# Patient Record
Sex: FEMALE | Race: WHITE | Employment: FULL TIME | URBAN - METROPOLITAN AREA
[De-identification: names, ages, dates, MRNs, and addresses within clinical notes are randomized per-mention and may not be internally consistent; named-entity substitution may affect disease eponyms.]

---

## 2017-10-28 ENCOUNTER — HOSPITAL ENCOUNTER (EMERGENCY)
Facility: HOSPITAL | Age: 52
Discharge: HOME | End: 2017-10-28
Attending: EMERGENCY MEDICINE
Payer: MEDICAID

## 2017-10-28 VITALS
SYSTOLIC BLOOD PRESSURE: 149 MMHG | DIASTOLIC BLOOD PRESSURE: 50 MMHG | OXYGEN SATURATION: 98 % | BODY MASS INDEX: 29.16 KG/M2 | RESPIRATION RATE: 18 BRPM | HEART RATE: 94 BPM | TEMPERATURE: 99 F | WEIGHT: 175 LBS | HEIGHT: 65 IN

## 2017-10-28 DIAGNOSIS — L02.91 ABSCESS: Primary | ICD-10-CM

## 2017-10-28 PROCEDURE — 99283 EMERGENCY DEPT VISIT LOW MDM: CPT

## 2017-10-28 PROCEDURE — 10061 I&D ABSCESS COMP/MULTIPLE: CPT

## 2017-10-28 RX ORDER — IBUPROFEN 600 MG/1
600 TABLET ORAL ONCE
Status: COMPLETED | OUTPATIENT
Start: 2017-10-28 | End: 2017-10-28

## 2017-10-28 RX ORDER — SULFAMETHOXAZOLE AND TRIMETHOPRIM 800; 160 MG/1; MG/1
1 TABLET ORAL 2 TIMES DAILY
Qty: 14 TABLET | Refills: 0 | Status: SHIPPED | OUTPATIENT
Start: 2017-10-28 | End: 2017-11-04

## 2017-10-28 NOTE — ED PROVIDER NOTES
Patient Seen in: [facility] Emergency Department    History   Patient presents with:  Abscess (integumentary)    Stated Complaint: cyst    HPI    This is a very pleasant 32-year-old female presents with right chest wall abscess near her right breast Wt 79.4 kg   LMP 10/17/2017   SpO2 98%   BMI 29.12 kg/m²         Physical Exam    GENERAL: Awake, alert oriented x3, nontoxic appearing. SKIN: Normal, warm, and dry. HEENT:  Pupils equally round and reactive to light. Conjuctiva clear.   Oropharynx is MG Oral Tab per tablet  Take 1 tablet by mouth 2 (two) times daily.   Qty: 14 tablet Refills: 0

## 2017-10-28 NOTE — ED INITIAL ASSESSMENT (HPI)
Pt has been dealing w/ small cyst for quite sometime. Location was between breast however the last week it has grown/ redness to area. Pain to right inner breast now too.

## 2017-10-30 ENCOUNTER — APPOINTMENT (OUTPATIENT)
Dept: ULTRASOUND IMAGING | Facility: HOSPITAL | Age: 52
End: 2017-10-30
Attending: EMERGENCY MEDICINE
Payer: MEDICAID

## 2017-10-30 ENCOUNTER — HOSPITAL ENCOUNTER (EMERGENCY)
Facility: HOSPITAL | Age: 52
Discharge: HOME | End: 2017-10-30
Attending: EMERGENCY MEDICINE
Payer: MEDICAID

## 2017-10-30 VITALS
TEMPERATURE: 99 F | SYSTOLIC BLOOD PRESSURE: 131 MMHG | HEART RATE: 80 BPM | WEIGHT: 175 LBS | BODY MASS INDEX: 29.16 KG/M2 | OXYGEN SATURATION: 100 % | DIASTOLIC BLOOD PRESSURE: 74 MMHG | RESPIRATION RATE: 16 BRPM | HEIGHT: 65 IN

## 2017-10-30 DIAGNOSIS — L02.91 ABSCESS: Primary | ICD-10-CM

## 2017-10-30 PROCEDURE — 87147 CULTURE TYPE IMMUNOLOGIC: CPT | Performed by: EMERGENCY MEDICINE

## 2017-10-30 PROCEDURE — 87070 CULTURE OTHR SPECIMN AEROBIC: CPT | Performed by: EMERGENCY MEDICINE

## 2017-10-30 PROCEDURE — 87076 CULTURE ANAEROBE IDENT EACH: CPT | Performed by: EMERGENCY MEDICINE

## 2017-10-30 PROCEDURE — 87205 SMEAR GRAM STAIN: CPT | Performed by: EMERGENCY MEDICINE

## 2017-10-30 PROCEDURE — 76642 ULTRASOUND BREAST LIMITED: CPT | Performed by: EMERGENCY MEDICINE

## 2017-10-30 PROCEDURE — 82962 GLUCOSE BLOOD TEST: CPT

## 2017-10-30 PROCEDURE — 99284 EMERGENCY DEPT VISIT MOD MDM: CPT

## 2017-10-30 NOTE — ED INITIAL ASSESSMENT (HPI)
Pt was seen here Saturday with abscess to R medial side of breast. Abscess was drained and packed and started on antibiotics. Pt remains on antibiotics, states doesn't look or feel better. Packing and dressing in place.  Pt reports pain, denies fevers or ch

## 2017-10-30 NOTE — ED PROVIDER NOTES
Patient Seen in: [facility] Emergency Department    History   Patient presents with:  Abscess (integumentary)    Stated Complaint: packing removed     HPI    Patient is a 60-year-old with a history of ADHD, arthritis.   She presents for evaluation of 80   Temp 98.9 °F (37.2 °C) (Temporal)   Resp 16   Ht 165.1 cm (5' 5\")   Wt 79.4 kg   LMP 10/17/2017   SpO2 100%   Breastfeeding? No   BMI 29.12 kg/m²         Physical Exam    General: Patient is awake, alert in no acute distress.    HEENT:  Sclera are not she should continue the Bactrim for now. She should return for new or worsening symptoms such as increased redness swelling or streaking otherwise follow-up with general surgery as an outpatient. She understands and agrees with the plan of care.       Dis

## 2017-10-31 NOTE — ED NOTES
Pt returned from [address]. No change in condition at this time.  A&A

## 2017-11-12 ENCOUNTER — HOSPITAL ENCOUNTER (EMERGENCY)
Facility: HOSPITAL | Age: 52
Discharge: HOME | End: 2017-11-12
Attending: EMERGENCY MEDICINE
Payer: MEDICAID

## 2017-11-12 VITALS
WEIGHT: 175 LBS | TEMPERATURE: 99 F | OXYGEN SATURATION: 98 % | BODY MASS INDEX: 29.16 KG/M2 | RESPIRATION RATE: 19 BRPM | DIASTOLIC BLOOD PRESSURE: 101 MMHG | SYSTOLIC BLOOD PRESSURE: 183 MMHG | HEART RATE: 98 BPM | HEIGHT: 65 IN

## 2017-11-12 DIAGNOSIS — L72.3 SEBACEOUS CYST: Primary | ICD-10-CM

## 2017-11-12 PROCEDURE — 99283 EMERGENCY DEPT VISIT LOW MDM: CPT

## 2017-11-12 PROCEDURE — 87077 CULTURE AEROBIC IDENTIFY: CPT | Performed by: EMERGENCY MEDICINE

## 2017-11-12 PROCEDURE — 87070 CULTURE OTHR SPECIMN AEROBIC: CPT | Performed by: EMERGENCY MEDICINE

## 2017-11-12 PROCEDURE — 10060 I&D ABSCESS SIMPLE/SINGLE: CPT

## 2017-11-12 PROCEDURE — 87205 SMEAR GRAM STAIN: CPT | Performed by: EMERGENCY MEDICINE

## 2017-11-12 NOTE — ED INITIAL ASSESSMENT (HPI)
Pt reports cyst between breasts. Had I&D 2 wks ago. Finished bactrim Rx. Presents with increased pain. Reports swelling and redness.

## 2017-11-12 NOTE — ED PROVIDER NOTES
Patient Seen in: [facility] Emergency Department    History   Patient presents with:  Cyst    Stated Complaint: cyst to chest wall. HPI    41-year-old female presents emergency room with chief complaint of cyst to the chest wall.   Patient was see no acute distress. HEENT:  no scleral icterus. Mucous membranes are moist  HEART: Regular rate and rhythm, no murmurs.   Chest: To the chest wall between the breast there is a 1 x 0.5 cm cystic lesion with a very small amount of clear to white drainage, n

## 2018-08-02 ENCOUNTER — HOSPITAL ENCOUNTER (EMERGENCY)
Facility: HOSPITAL | Age: 53
Discharge: HOME | End: 2018-08-02
Attending: EMERGENCY MEDICINE
Payer: MEDICAID

## 2018-08-02 VITALS
DIASTOLIC BLOOD PRESSURE: 81 MMHG | BODY MASS INDEX: 28 KG/M2 | HEART RATE: 78 BPM | SYSTOLIC BLOOD PRESSURE: 113 MMHG | OXYGEN SATURATION: 100 % | TEMPERATURE: 99 F | WEIGHT: 170 LBS | RESPIRATION RATE: 20 BRPM

## 2018-08-02 DIAGNOSIS — L02.91 ABSCESS: Primary | ICD-10-CM

## 2018-08-02 PROCEDURE — 99283 EMERGENCY DEPT VISIT LOW MDM: CPT

## 2018-08-02 PROCEDURE — 10061 I&D ABSCESS COMP/MULTIPLE: CPT

## 2018-08-02 RX ORDER — CLINDAMYCIN HYDROCHLORIDE 300 MG/1
300 CAPSULE ORAL 3 TIMES DAILY
Qty: 30 CAPSULE | Refills: 0 | Status: SHIPPED | OUTPATIENT
Start: 2018-08-02 | End: 2018-08-12

## 2018-08-02 RX ORDER — LIDOCAINE HYDROCHLORIDE AND EPINEPHRINE 15; 5 MG/ML; UG/ML
INJECTION, SOLUTION EPIDURAL
Status: COMPLETED
Start: 2018-08-02 | End: 2018-08-02

## 2018-08-02 NOTE — ED INITIAL ASSESSMENT (HPI)
Pt here with cyst in between breasts that started a few days ago. Pt has hx of I&D to this area less than a year ago.

## 2018-08-02 NOTE — ED PROVIDER NOTES
Patient Seen in: [facility] Emergency Department    History   Patient presents with:  Abscess (integumentary)    Stated Complaint: Abscess to chest     HPI    This is a 27-year-old female complaining of a cystic area between her breasts that has got lesions, neck is supple without masses. RESPIRATORY: Breath sounds are clear bilaterally without wheezes, rales, or rhonchi. Chest: There is a fluctuant 3 cm area in the inferior right aspect of the sternum.   There is mild induration and tenderness s medications    Clindamycin HCl 300 MG Oral Cap  Take 1 capsule (300 mg total) by mouth 3 (three) times daily.   Qty: 30 capsule Refills: 0

## 2019-01-23 ENCOUNTER — HOSPITAL ENCOUNTER (EMERGENCY)
Facility: HOSPITAL | Age: 54
Discharge: HOME | End: 2019-01-23
Attending: EMERGENCY MEDICINE
Payer: MEDICAID

## 2019-01-23 VITALS
HEART RATE: 102 BPM | OXYGEN SATURATION: 97 % | SYSTOLIC BLOOD PRESSURE: 168 MMHG | BODY MASS INDEX: 29.16 KG/M2 | HEIGHT: 65 IN | WEIGHT: 175 LBS | DIASTOLIC BLOOD PRESSURE: 86 MMHG | RESPIRATION RATE: 18 BRPM | TEMPERATURE: 99 F

## 2019-01-23 DIAGNOSIS — L08.9 INFECTED SEBACEOUS CYST: Primary | ICD-10-CM

## 2019-01-23 DIAGNOSIS — L72.3 INFECTED SEBACEOUS CYST: Primary | ICD-10-CM

## 2019-01-23 PROCEDURE — 99283 EMERGENCY DEPT VISIT LOW MDM: CPT

## 2019-01-23 PROCEDURE — 10061 I&D ABSCESS COMP/MULTIPLE: CPT | Performed by: PHYSICIAN ASSISTANT

## 2019-01-23 PROCEDURE — 10061 I&D ABSCESS COMP/MULTIPLE: CPT

## 2019-01-23 RX ORDER — CLINDAMYCIN HYDROCHLORIDE 300 MG/1
300 CAPSULE ORAL 3 TIMES DAILY
Qty: 30 CAPSULE | Refills: 0 | Status: SHIPPED | OUTPATIENT
Start: 2019-01-23 | End: 2019-01-30

## 2019-01-23 NOTE — ED PROVIDER NOTES
Patient Seen in: [facility] Emergency Department    History   Patient presents with:  Cyst    Stated Complaint: cyst to skin/needs drainage    HPI    CHIEF COMPLAINT: Infected cyst    HISTORY OF PRESENT ILLNESS: Vision is a 64-year-old female presen Other systems are as noted in HPI. Constitutional and vital signs reviewed. All other systems reviewed and negative except as noted above. Physical Exam     ED Triage Vitals [01/23/19 1520]   BP (!) 168/86   Pulse 102   Resp 18   Temp 98.9 °F (37. The patient abscess was located in the chest wall. I obtained verbal consent from the patient to drain the abscess who was informed about the possibility of bleeding, pain and worsening of the condition. Area was anesthetized with 1% lidocaine.  The absce

## 2019-01-29 ENCOUNTER — OFFICE VISIT (OUTPATIENT)
Dept: SURGERY | Facility: CLINIC | Age: 54
End: 2019-01-29
Payer: MEDICAID

## 2019-01-29 VITALS
DIASTOLIC BLOOD PRESSURE: 82 MMHG | SYSTOLIC BLOOD PRESSURE: 135 MMHG | WEIGHT: 175 LBS | BODY MASS INDEX: 29.16 KG/M2 | HEART RATE: 76 BPM | TEMPERATURE: 99 F | HEIGHT: 65 IN

## 2019-01-29 DIAGNOSIS — L72.0 EPIDERMOID CYST: Primary | ICD-10-CM

## 2019-01-29 DIAGNOSIS — Z01.818 PREOP TESTING: ICD-10-CM

## 2019-01-29 PROCEDURE — 99203 OFFICE O/P NEW LOW 30 MIN: CPT | Performed by: SURGERY

## 2019-01-29 NOTE — H&P
New Patient Visit Note       Active Problems      1. Epidermoid cyst        Chief Complaint   Patient presents with:  Cyst: New patient seen in ED on 1/23 for anterior chest cyst, I & D done C/O itching, patient denies any pain, discharge or bleeding. • Arthritis    • Genital warts      Past Surgical History:   Procedure Laterality Date   • LASER SURGERY OF CERVIX      genital warts removal   • OTHER SURGICAL HISTORY  age 11    urethra surgery   • TUBAL LIGATION  2005       The family history and socia abdominal pain, anal bleeding, blood in stool, constipation, diarrhea, nausea and vomiting. Genitourinary: Negative for difficulty urinating, dysuria, frequency and urgency. Musculoskeletal: Negative for arthralgias and myalgias.    Skin: Negative for c cellulitis. Psychiatric: She has a normal mood and affect. Her behavior is normal. Judgment and thought content normal.   Nursing note and vitals reviewed.         Assessment   Epidermoid cyst  (primary encounter diagnosis)      Plan   The patient will be

## 2019-02-04 ENCOUNTER — APPOINTMENT (OUTPATIENT)
Dept: LAB | Age: 54
End: 2019-02-04
Attending: SURGERY
Payer: MEDICAID

## 2019-02-04 DIAGNOSIS — Z01.818 PREOP TESTING: ICD-10-CM

## 2019-02-04 LAB
ATRIAL RATE: 81 BPM
P AXIS: 66 DEGREES
P-R INTERVAL: 156 MS
Q-T INTERVAL: 374 MS
QRS DURATION: 80 MS
QTC CALCULATION (BEZET): 434 MS
R AXIS: 35 DEGREES
T AXIS: 53 DEGREES
VENTRICULAR RATE: 81 BPM

## 2019-02-04 PROCEDURE — 93010 ELECTROCARDIOGRAM REPORT: CPT | Performed by: INTERNAL MEDICINE

## 2019-02-04 PROCEDURE — 93005 ELECTROCARDIOGRAM TRACING: CPT

## 2019-02-08 ENCOUNTER — LAB REQUISITION (OUTPATIENT)
Dept: LAB | Facility: HOSPITAL | Age: 54
End: 2019-02-08
Payer: MEDICAID

## 2019-02-08 DIAGNOSIS — L02.213 CUTANEOUS ABSCESS OF CHEST WALL: ICD-10-CM

## 2019-02-08 PROCEDURE — 88304 TISSUE EXAM BY PATHOLOGIST: CPT | Performed by: SURGERY

## 2019-02-22 ENCOUNTER — OFFICE VISIT (OUTPATIENT)
Dept: SURGERY | Facility: CLINIC | Age: 54
End: 2019-02-22

## 2019-02-22 VITALS
WEIGHT: 175 LBS | DIASTOLIC BLOOD PRESSURE: 80 MMHG | TEMPERATURE: 99 F | HEART RATE: 84 BPM | SYSTOLIC BLOOD PRESSURE: 128 MMHG | BODY MASS INDEX: 29 KG/M2

## 2019-02-22 DIAGNOSIS — L72.0 EPIDERMOID CYST: Primary | ICD-10-CM

## 2019-02-22 PROCEDURE — 99024 POSTOP FOLLOW-UP VISIT: CPT | Performed by: SURGERY

## 2019-02-22 NOTE — PROGRESS NOTES
Post Operative Visit Note       Active Problems  1. Epidermoid cyst         Chief Complaint   Patient presents with:  Post-Op: PO 2/8 excision of cyst on chest         History of Present Illness     Doing well. Minimal pain. Back to work.   Pathology ludmila Disp: 240 g, Rfl: 1      Review of Systems  The Review of Systems has been reviewed by me during today. Review of Systems   Constitutional: Negative for chills, diaphoresis, fatigue, fever and unexpected weight change.    HENT: Negative for hearing loss, n

## 2019-03-01 ENCOUNTER — OFFICE VISIT (OUTPATIENT)
Dept: SURGERY | Facility: CLINIC | Age: 54
End: 2019-03-01

## 2019-03-01 VITALS
WEIGHT: 175 LBS | TEMPERATURE: 98 F | BODY MASS INDEX: 29.16 KG/M2 | HEIGHT: 65 IN | HEART RATE: 85 BPM | DIASTOLIC BLOOD PRESSURE: 80 MMHG | SYSTOLIC BLOOD PRESSURE: 115 MMHG

## 2019-03-01 DIAGNOSIS — L72.0 EPIDERMOID CYST: Primary | ICD-10-CM

## 2019-03-01 PROCEDURE — 99024 POSTOP FOLLOW-UP VISIT: CPT | Performed by: SURGERY

## 2019-03-01 RX ORDER — LEVOFLOXACIN 500 MG/1
1 TABLET, FILM COATED ORAL DAILY
Refills: 1 | COMMUNITY
Start: 2019-02-18 | End: 2021-11-30

## 2019-03-01 NOTE — PROGRESS NOTES
Post Operative Visit Note       Active Problems  1. Epidermoid cyst         Chief Complaint   Patient presents with:  Post-Op: post op 2nd po excision of chest cyst. Pt denies fever, chills, drainage, redness and pain.           History of Present Illness daily., Disp: 240 g, Rfl: 1      Review of Systems  The Review of Systems has been reviewed by me during today.   Review of Systems    Physical Findings   /80   Pulse 85   Temp 98.3 °F (36.8 °C)   Ht 65\"   Wt 175 lb   BMI 29.12 kg/m²   Physical Exam

## 2021-11-30 ENCOUNTER — OFFICE VISIT (OUTPATIENT)
Dept: INTERNAL MEDICINE CLINIC | Facility: CLINIC | Age: 56
End: 2021-11-30
Payer: COMMERCIAL

## 2021-11-30 ENCOUNTER — TELEPHONE (OUTPATIENT)
Dept: INTERNAL MEDICINE CLINIC | Facility: CLINIC | Age: 56
End: 2021-11-30

## 2021-11-30 VITALS
HEIGHT: 65 IN | SYSTOLIC BLOOD PRESSURE: 108 MMHG | WEIGHT: 153 LBS | BODY MASS INDEX: 25.49 KG/M2 | DIASTOLIC BLOOD PRESSURE: 68 MMHG | TEMPERATURE: 98 F | HEART RATE: 84 BPM

## 2021-11-30 DIAGNOSIS — Z00.00 ROUTINE GENERAL MEDICAL EXAMINATION AT A HEALTH CARE FACILITY: Primary | ICD-10-CM

## 2021-11-30 DIAGNOSIS — Z11.51 SCREENING FOR HUMAN PAPILLOMAVIRUS (HPV): ICD-10-CM

## 2021-11-30 DIAGNOSIS — Z13.220 SCREENING, LIPID: ICD-10-CM

## 2021-11-30 DIAGNOSIS — M54.31 RIGHT SCIATIC NERVE PAIN: ICD-10-CM

## 2021-11-30 DIAGNOSIS — Z12.31 ENCOUNTER FOR SCREENING MAMMOGRAM FOR MALIGNANT NEOPLASM OF BREAST: ICD-10-CM

## 2021-11-30 DIAGNOSIS — Z12.11 SCREEN FOR COLON CANCER: ICD-10-CM

## 2021-11-30 DIAGNOSIS — Z12.4 SCREENING FOR CERVICAL CANCER: ICD-10-CM

## 2021-11-30 DIAGNOSIS — Z13.0 SCREENING FOR BLOOD DISEASE: ICD-10-CM

## 2021-11-30 DIAGNOSIS — Z13.29 SCREENING FOR THYROID DISORDER: ICD-10-CM

## 2021-11-30 DIAGNOSIS — Z13.1 SCREENING FOR DIABETES MELLITUS: ICD-10-CM

## 2021-11-30 DIAGNOSIS — Z11.3 SCREEN FOR STD (SEXUALLY TRANSMITTED DISEASE): ICD-10-CM

## 2021-11-30 PROBLEM — L72.0 EPIDERMOID CYST: Status: RESOLVED | Noted: 2019-01-29 | Resolved: 2021-11-30

## 2021-11-30 PROCEDURE — 87591 N.GONORRHOEAE DNA AMP PROB: CPT | Performed by: NURSE PRACTITIONER

## 2021-11-30 PROCEDURE — 87491 CHLMYD TRACH DNA AMP PROBE: CPT | Performed by: NURSE PRACTITIONER

## 2021-11-30 PROCEDURE — 90750 HZV VACC RECOMBINANT IM: CPT | Performed by: NURSE PRACTITIONER

## 2021-11-30 PROCEDURE — 3078F DIAST BP <80 MM HG: CPT | Performed by: NURSE PRACTITIONER

## 2021-11-30 PROCEDURE — 88175 CYTOPATH C/V AUTO FLUID REDO: CPT | Performed by: NURSE PRACTITIONER

## 2021-11-30 PROCEDURE — 3074F SYST BP LT 130 MM HG: CPT | Performed by: NURSE PRACTITIONER

## 2021-11-30 PROCEDURE — 3008F BODY MASS INDEX DOCD: CPT | Performed by: NURSE PRACTITIONER

## 2021-11-30 PROCEDURE — 87624 HPV HI-RISK TYP POOLED RSLT: CPT | Performed by: NURSE PRACTITIONER

## 2021-11-30 PROCEDURE — 90471 IMMUNIZATION ADMIN: CPT | Performed by: NURSE PRACTITIONER

## 2021-11-30 PROCEDURE — 99386 PREV VISIT NEW AGE 40-64: CPT | Performed by: NURSE PRACTITIONER

## 2021-11-30 RX ORDER — NAPROXEN 500 MG/1
500 TABLET ORAL 2 TIMES DAILY WITH MEALS
Qty: 60 TABLET | Refills: 0 | Status: SHIPPED | OUTPATIENT
Start: 2021-11-30 | End: 2021-12-27

## 2021-11-30 NOTE — PROGRESS NOTES
Shelia Sandoval is a 55year old female who presents for a complete physical exam:       Patient complains of:    New patient to re establish  Has not been following with a physician since our last ov.   Due for labs, mammogram and other health maintenance 01/13/2014      >=9 YRS AFLURIA TRI PRESERV FREE SINGLE DOSE (54992) FLU CLINIC                          11/16/2015      Covid-19 Vaccine Pfizer 30 mcg/0.3 ml                          03/19/2021 04/13/2021      FLUZONE 6 months and older PFS 0.5 ml (63351 no apparent distress  SKIN: no rashes,no suspicious lesions  HEENT: atraumatic, normocephalic,ears and throat are clear  EYES:PERRLA, EOMI, conjunctiva are clear  NECK: supple,no adenopathy,  CHEST: no chest tenderness  BREAST: no dominant or suspicious ma NJX  EVALUATE & TREAT, GASTRO (INTERNAL)  [facility] SAGAR 2D+3D SCREENING BILAT (CPT=77067/85119)    No follow-ups on file. There are no Patient Instructions on file for this visit.

## 2021-12-03 ENCOUNTER — LAB ENCOUNTER (OUTPATIENT)
Dept: LAB | Age: 56
End: 2021-12-03
Attending: NURSE PRACTITIONER
Payer: COMMERCIAL

## 2021-12-03 DIAGNOSIS — Z13.220 SCREENING, LIPID: ICD-10-CM

## 2021-12-03 DIAGNOSIS — Z13.29 SCREENING FOR THYROID DISORDER: ICD-10-CM

## 2021-12-03 DIAGNOSIS — Z13.1 SCREENING FOR DIABETES MELLITUS: ICD-10-CM

## 2021-12-03 DIAGNOSIS — Z13.0 SCREENING FOR BLOOD DISEASE: ICD-10-CM

## 2021-12-03 PROCEDURE — 80050 GENERAL HEALTH PANEL: CPT | Performed by: NURSE PRACTITIONER

## 2021-12-03 PROCEDURE — 80061 LIPID PANEL: CPT | Performed by: NURSE PRACTITIONER

## 2021-12-03 RX ORDER — CLINDAMYCIN PHOSPHATE 20 MG/G
1 CREAM VAGINAL NIGHTLY
Qty: 40 G | Refills: 0 | Status: SHIPPED | OUTPATIENT
Start: 2021-12-03

## 2021-12-05 ENCOUNTER — HOSPITAL ENCOUNTER (OUTPATIENT)
Dept: MAMMOGRAPHY | Age: 56
Discharge: HOME | End: 2021-12-05
Attending: NURSE PRACTITIONER
Payer: COMMERCIAL

## 2021-12-05 DIAGNOSIS — Z12.31 ENCOUNTER FOR SCREENING MAMMOGRAM FOR MALIGNANT NEOPLASM OF BREAST: ICD-10-CM

## 2021-12-05 PROCEDURE — 77067 SCR MAMMO BI INCL CAD: CPT | Performed by: NURSE PRACTITIONER

## 2021-12-05 PROCEDURE — 77063 BREAST TOMOSYNTHESIS BI: CPT | Performed by: NURSE PRACTITIONER

## 2021-12-06 ENCOUNTER — TELEPHONE (OUTPATIENT)
Dept: INTERNAL MEDICINE CLINIC | Facility: CLINIC | Age: 56
End: 2021-12-06

## 2021-12-06 DIAGNOSIS — E78.5 DYSLIPIDEMIA: Primary | ICD-10-CM

## 2021-12-06 RX ORDER — ATORVASTATIN CALCIUM 10 MG/1
10 TABLET, FILM COATED ORAL DAILY
Qty: 90 TABLET | Refills: 1 | Status: SHIPPED | OUTPATIENT
Start: 2021-12-06 | End: 2022-10-28

## 2021-12-06 NOTE — TELEPHONE ENCOUNTER
Pt stated if she has to pay for this appt (fu on labs) she would rather not. Pt stated she know she has high cholesterol and is a single mom and doesn't want to pay the office visit charge. Pt stated if SD wants to prescribe her something that would be fine. Please advise.

## 2021-12-06 NOTE — TELEPHONE ENCOUNTER
lipitor 10mg started  Labs and ov in 3 months.   Will need to see me at that time as she was a new patient to me in November

## 2021-12-07 NOTE — TELEPHONE ENCOUNTER
Patient has medical concerns that may and will require repeat testing and ov during the year. This may not be every year but she is a new patient to me and my recommendation is she start a statin due to high cholesterol. Unfortunately, I am not able to control the cost put forth by her insurance. Please confirm with patient we will be documenting my recommendations.

## 2021-12-07 NOTE — TELEPHONE ENCOUNTER
I called pt to schedule an appt.  Pt declined stated she has a high ded and refused to come in and pay for a visit outside f her physical. Please advise

## 2021-12-08 NOTE — TELEPHONE ENCOUNTER
TC to pt to explain the need to schedule an ov with SD so she can manage medication and other health concerns. I did advise the patient if she is concerned about payment we do have some options like payment plans to help with cost.  Patient declined to schedule an appt and states she has not started the medication yet and wants to do some research prior to taking.   Patient will call back if and when she wants to schedule an ov with SD.

## 2021-12-23 NOTE — PROGRESS NOTES
Addendum states that when compared to old films there is no change from prior. Routine f/u in 12 mos suggested.

## 2021-12-27 RX ORDER — NAPROXEN 500 MG/1
TABLET ORAL
Qty: 60 TABLET | Refills: 0 | Status: SHIPPED | OUTPATIENT
Start: 2021-12-27 | End: 2022-01-24

## 2021-12-27 NOTE — TELEPHONE ENCOUNTER
Last OV 11/30/21  Last PE 11/30/21  Last REFILL   Medication Quantity Refills Start End   naproxen 500 MG Oral Tab 60 tablet 0 11/30/2021      Last LABS Tsh, lipid, cmp, cbc 12/3/21    Future Appointments   Date Time Provider Ester Hernandez   2/11/2022

## 2022-01-24 RX ORDER — NAPROXEN 500 MG/1
TABLET ORAL
Qty: 60 TABLET | Refills: 0 | Status: SHIPPED | OUTPATIENT
Start: 2022-01-24

## 2022-01-24 NOTE — TELEPHONE ENCOUNTER
Last VISIT 11/30/21    Last CPE 11/30/21    Last REFILL 12/27/21 qty 60 w/0 refills    Last LABS 12/03/21 CBC, CMP, Lipid, TSH done    No Future Appointments      Per PROTOCOL? Not on protocol      Please Approve or Deny.

## 2022-02-08 ENCOUNTER — LAB ENCOUNTER (OUTPATIENT)
Dept: LAB | Facility: HOSPITAL | Age: 57
End: 2022-02-08
Attending: STUDENT IN AN ORGANIZED HEALTH CARE EDUCATION/TRAINING PROGRAM
Payer: COMMERCIAL

## 2022-02-08 DIAGNOSIS — Z01.818 PRE-OP TESTING: ICD-10-CM

## 2022-02-09 LAB — SARS-COV-2 RNA RESP QL NAA+PROBE: NOT DETECTED

## 2022-02-11 PROBLEM — K57.30 DIVERTICULOSIS, SIGMOID: Status: ACTIVE | Noted: 2022-02-11

## 2022-02-11 PROBLEM — Z12.11 SPECIAL SCREENING FOR MALIGNANT NEOPLASM OF COLON: Status: ACTIVE | Noted: 2022-02-11

## 2022-02-17 ENCOUNTER — NURSE ONLY (OUTPATIENT)
Dept: INTERNAL MEDICINE CLINIC | Facility: CLINIC | Age: 57
End: 2022-02-17
Payer: COMMERCIAL

## 2022-02-17 PROCEDURE — 90471 IMMUNIZATION ADMIN: CPT | Performed by: NURSE PRACTITIONER

## 2022-02-17 PROCEDURE — 90750 HZV VACC RECOMBINANT IM: CPT | Performed by: NURSE PRACTITIONER

## 2022-02-23 RX ORDER — NAPROXEN 500 MG/1
TABLET ORAL
Qty: 60 TABLET | Refills: 0 | Status: SHIPPED | OUTPATIENT
Start: 2022-02-23

## 2022-02-23 NOTE — TELEPHONE ENCOUNTER
Last OV: 11/30/21 annual PE    No future appointments. Latest labs: 12/3/21 TSH, Lipid, CMP and CBC    Latest RX: NAPROXEN 500MG TABLETS 60 tabs 0 refills on 1/24/22    Per protocol, not on protocol. Rx pending.
Speaking Coherently

## 2022-06-06 ENCOUNTER — TELEPHONE (OUTPATIENT)
Dept: INTERNAL MEDICINE CLINIC | Facility: CLINIC | Age: 57
End: 2022-06-06

## 2022-06-06 NOTE — TELEPHONE ENCOUNTER
She should be seen here first before referral to ortho. Has she tried PT? Xray results? Do not see at THE [facility] or Care everywhere?

## 2022-06-07 NOTE — TELEPHONE ENCOUNTER
Patient states she saw Dr Geovanny Delarosa and xrays indicating spondylolisthesis. Pt would rather talk with SD about this issue, scheduled appt for 6/16/22 with SD. Pt will have ov notes and xray results to SD for her review, fax number given. Pt verbalizes understanding.   FYI to SD.

## 2022-06-16 ENCOUNTER — OFFICE VISIT (OUTPATIENT)
Dept: INTERNAL MEDICINE CLINIC | Facility: CLINIC | Age: 57
End: 2022-06-16
Payer: COMMERCIAL

## 2022-06-16 VITALS
WEIGHT: 155 LBS | OXYGEN SATURATION: 98 % | TEMPERATURE: 98 F | HEIGHT: 65 IN | HEART RATE: 70 BPM | BODY MASS INDEX: 25.83 KG/M2 | DIASTOLIC BLOOD PRESSURE: 68 MMHG | SYSTOLIC BLOOD PRESSURE: 110 MMHG

## 2022-06-16 DIAGNOSIS — E78.5 DYSLIPIDEMIA: ICD-10-CM

## 2022-06-16 DIAGNOSIS — M43.16 ANTEROLISTHESIS OF LUMBAR SPINE: ICD-10-CM

## 2022-06-16 DIAGNOSIS — M25.559 HIP PAIN: ICD-10-CM

## 2022-06-16 DIAGNOSIS — M54.16 LUMBAR RADICULITIS: Primary | ICD-10-CM

## 2022-06-16 PROCEDURE — 99214 OFFICE O/P EST MOD 30 MIN: CPT | Performed by: NURSE PRACTITIONER

## 2022-06-16 PROCEDURE — 3074F SYST BP LT 130 MM HG: CPT | Performed by: NURSE PRACTITIONER

## 2022-06-16 PROCEDURE — 3078F DIAST BP <80 MM HG: CPT | Performed by: NURSE PRACTITIONER

## 2022-06-16 PROCEDURE — 3008F BODY MASS INDEX DOCD: CPT | Performed by: NURSE PRACTITIONER

## 2022-06-22 ENCOUNTER — TELEPHONE (OUTPATIENT)
Dept: INTERNAL MEDICINE CLINIC | Facility: CLINIC | Age: 57
End: 2022-06-22

## 2022-06-29 ENCOUNTER — HOSPITAL ENCOUNTER (OUTPATIENT)
Dept: GENERAL RADIOLOGY | Facility: HOSPITAL | Age: 57
Discharge: HOME | End: 2022-06-29
Attending: ANESTHESIOLOGY
Payer: COMMERCIAL

## 2022-06-29 ENCOUNTER — OFFICE VISIT (OUTPATIENT)
Dept: PAIN CLINIC | Facility: CLINIC | Age: 57
End: 2022-06-29
Payer: COMMERCIAL

## 2022-06-29 ENCOUNTER — TELEPHONE (OUTPATIENT)
Dept: PAIN CLINIC | Facility: CLINIC | Age: 57
End: 2022-06-29

## 2022-06-29 VITALS
DIASTOLIC BLOOD PRESSURE: 68 MMHG | BODY MASS INDEX: 26 KG/M2 | OXYGEN SATURATION: 98 % | SYSTOLIC BLOOD PRESSURE: 102 MMHG | HEART RATE: 94 BPM | WEIGHT: 155 LBS

## 2022-06-29 DIAGNOSIS — M25.551 CHRONIC PAIN OF BOTH HIPS: Primary | ICD-10-CM

## 2022-06-29 DIAGNOSIS — M25.551 HIP PAIN, CHRONIC, RIGHT: Primary | ICD-10-CM

## 2022-06-29 DIAGNOSIS — G89.29 CHRONIC PAIN OF BOTH KNEES: ICD-10-CM

## 2022-06-29 DIAGNOSIS — M25.561 CHRONIC PAIN OF BOTH KNEES: ICD-10-CM

## 2022-06-29 DIAGNOSIS — M25.552 CHRONIC PAIN OF BOTH HIPS: ICD-10-CM

## 2022-06-29 DIAGNOSIS — M25.562 CHRONIC PAIN OF BOTH KNEES: ICD-10-CM

## 2022-06-29 DIAGNOSIS — G89.29 CHRONIC PAIN OF BOTH HIPS: ICD-10-CM

## 2022-06-29 DIAGNOSIS — G89.29 CHRONIC PAIN OF BOTH HIPS: Primary | ICD-10-CM

## 2022-06-29 DIAGNOSIS — M25.551 HIP PAIN, RIGHT: ICD-10-CM

## 2022-06-29 DIAGNOSIS — M25.552 CHRONIC PAIN OF BOTH HIPS: Primary | ICD-10-CM

## 2022-06-29 DIAGNOSIS — G89.29 HIP PAIN, CHRONIC, RIGHT: Primary | ICD-10-CM

## 2022-06-29 DIAGNOSIS — M25.551 CHRONIC PAIN OF BOTH HIPS: ICD-10-CM

## 2022-06-29 PROCEDURE — 3074F SYST BP LT 130 MM HG: CPT | Performed by: ANESTHESIOLOGY

## 2022-06-29 PROCEDURE — 99205 OFFICE O/P NEW HI 60 MIN: CPT | Performed by: ANESTHESIOLOGY

## 2022-06-29 PROCEDURE — 73560 X-RAY EXAM OF KNEE 1 OR 2: CPT | Performed by: ANESTHESIOLOGY

## 2022-06-29 PROCEDURE — 3078F DIAST BP <80 MM HG: CPT | Performed by: ANESTHESIOLOGY

## 2022-06-29 PROCEDURE — 96372 THER/PROPH/DIAG INJ SC/IM: CPT | Performed by: ANESTHESIOLOGY

## 2022-06-29 PROCEDURE — 73523 X-RAY EXAM HIPS BI 5/> VIEWS: CPT | Performed by: ANESTHESIOLOGY

## 2022-06-29 RX ORDER — CELECOXIB 100 MG/1
100 CAPSULE ORAL 2 TIMES DAILY
Qty: 60 CAPSULE | Refills: 0 | Status: SHIPPED | OUTPATIENT
Start: 2022-06-29

## 2022-06-29 RX ORDER — COVID-19 ANTIGEN TEST
220 KIT MISCELLANEOUS
COMMUNITY

## 2022-06-29 RX ORDER — IBUPROFEN 200 MG
200 TABLET ORAL EVERY 6 HOURS PRN
COMMUNITY

## 2022-06-29 RX ORDER — KETOROLAC TROMETHAMINE 30 MG/ML
30 INJECTION, SOLUTION INTRAMUSCULAR; INTRAVENOUS ONCE
Status: COMPLETED | OUTPATIENT
Start: 2022-06-29 | End: 2022-06-29

## 2022-06-29 RX ORDER — ACETAMINOPHEN 325 MG/1
325 TABLET ORAL EVERY 6 HOURS PRN
COMMUNITY

## 2022-06-29 RX ADMIN — KETOROLAC TROMETHAMINE 30 MG: 30 INJECTION, SOLUTION INTRAMUSCULAR; INTRAVENOUS at 09:24:00

## 2022-06-29 NOTE — TELEPHONE ENCOUNTER
Call patient to relay results of hip x-ray. Does have moderate to severe right-sided hip osteoarthritis. Discussed intra-articular hip injection.   We will place referral.

## 2022-07-01 ENCOUNTER — TELEPHONE (OUTPATIENT)
Dept: NEUROLOGY | Facility: CLINIC | Age: 57
End: 2022-07-01

## 2022-07-01 DIAGNOSIS — M16.11 ARTHRITIS OF RIGHT HIP: Primary | ICD-10-CM

## 2022-07-01 NOTE — TELEPHONE ENCOUNTER
Prior authorization request completed for: Right hip injection   Authorization # [id number]  Authorization dates: 7/1/2022 - 9/29/2022   CPT codes approved: 35242  Number of visits/dates of service approved: 1  Physician: Dr. Tha Hernandes   Location: THE [facility]     Patient can be scheduled. Routed to Navigator.

## 2022-07-05 ENCOUNTER — HOSPITAL ENCOUNTER (OUTPATIENT)
Facility: HOSPITAL | Age: 57
Setting detail: HOSPITAL OUTPATIENT SURGERY
Discharge: HOME | End: 2022-07-05
Attending: ANESTHESIOLOGY | Admitting: ANESTHESIOLOGY
Payer: COMMERCIAL

## 2022-07-05 ENCOUNTER — APPOINTMENT (OUTPATIENT)
Dept: GENERAL RADIOLOGY | Facility: HOSPITAL | Age: 57
End: 2022-07-05
Attending: ANESTHESIOLOGY
Payer: COMMERCIAL

## 2022-07-05 VITALS
TEMPERATURE: 98 F | RESPIRATION RATE: 22 BRPM | SYSTOLIC BLOOD PRESSURE: 125 MMHG | DIASTOLIC BLOOD PRESSURE: 83 MMHG | OXYGEN SATURATION: 100 % | HEART RATE: 82 BPM

## 2022-07-05 DIAGNOSIS — M16.11 ARTHRITIS OF RIGHT HIP: ICD-10-CM

## 2022-07-05 PROCEDURE — 3E0U33Z INTRODUCTION OF ANTI-INFLAMMATORY INTO JOINTS, PERCUTANEOUS APPROACH: ICD-10-PCS | Performed by: ANESTHESIOLOGY

## 2022-07-05 PROCEDURE — BQ10ZZZ FLUOROSCOPY OF RIGHT HIP: ICD-10-PCS | Performed by: ANESTHESIOLOGY

## 2022-07-05 PROCEDURE — 3E0U3BZ INTRODUCTION OF ANESTHETIC AGENT INTO JOINTS, PERCUTANEOUS APPROACH: ICD-10-PCS | Performed by: ANESTHESIOLOGY

## 2022-07-05 PROCEDURE — 77002 NEEDLE LOCALIZATION BY XRAY: CPT | Performed by: ANESTHESIOLOGY

## 2022-07-05 RX ORDER — LIDOCAINE HYDROCHLORIDE 10 MG/ML
INJECTION, SOLUTION EPIDURAL; INFILTRATION; INTRACAUDAL; PERINEURAL
Status: DISCONTINUED | OUTPATIENT
Start: 2022-07-05 | End: 2022-07-05

## 2022-07-05 RX ORDER — METHYLPREDNISOLONE ACETATE 40 MG/ML
INJECTION, SUSPENSION INTRA-ARTICULAR; INTRALESIONAL; INTRAMUSCULAR; SOFT TISSUE
Status: DISCONTINUED | OUTPATIENT
Start: 2022-07-05 | End: 2022-07-05

## 2022-07-20 ENCOUNTER — OFFICE VISIT (OUTPATIENT)
Dept: PAIN CLINIC | Facility: CLINIC | Age: 57
End: 2022-07-20
Payer: COMMERCIAL

## 2022-07-20 VITALS — OXYGEN SATURATION: 98 % | DIASTOLIC BLOOD PRESSURE: 60 MMHG | HEART RATE: 78 BPM | SYSTOLIC BLOOD PRESSURE: 100 MMHG

## 2022-07-20 DIAGNOSIS — M25.551 HIP PAIN, RIGHT: ICD-10-CM

## 2022-07-20 DIAGNOSIS — M51.36 DDD (DEGENERATIVE DISC DISEASE), LUMBAR: Primary | ICD-10-CM

## 2022-07-20 PROBLEM — M51.369 DDD (DEGENERATIVE DISC DISEASE), LUMBAR: Status: ACTIVE | Noted: 2022-07-20

## 2022-07-20 PROCEDURE — 3078F DIAST BP <80 MM HG: CPT | Performed by: ANESTHESIOLOGY

## 2022-07-20 PROCEDURE — 3074F SYST BP LT 130 MM HG: CPT | Performed by: ANESTHESIOLOGY

## 2022-07-20 PROCEDURE — 99214 OFFICE O/P EST MOD 30 MIN: CPT | Performed by: ANESTHESIOLOGY

## 2022-07-22 ENCOUNTER — TELEPHONE (OUTPATIENT)
Dept: NEUROLOGY | Facility: CLINIC | Age: 57
End: 2022-07-22

## 2022-07-22 DIAGNOSIS — M51.36 DDD (DEGENERATIVE DISC DISEASE), LUMBAR: Primary | ICD-10-CM

## 2022-07-22 NOTE — TELEPHONE ENCOUNTER
Prior authorization request completed for: ANTONI   Authorization # NO PRIOR AUTHORIZATION REQUIRED  Authorization dates: N/A  CPT codes approved: 85080  Number of visits/dates of service approved: 1  Physician: Dr. Azra Marie   Location: THE [facility]     Patient can be scheduled. Routed to Navigator. Notification or Prior Authorization is not required for the requested services    This [facility] [employer] plan does not currently require a prior authorization for these services. If you have general questions about the prior authorization requirements, please call us at [phone] or visit [employer] > Clinician Resources > Advance and Admission Notification Requirements. The number above acknowledges your notification. Please write this number down for future reference. Notification is not a guarantee of coverage or payment.     Decision ID #:[id number]

## 2022-07-25 RX ORDER — CELECOXIB 100 MG/1
CAPSULE ORAL
Qty: 60 CAPSULE | Refills: 0 | Status: SHIPPED | OUTPATIENT
Start: 2022-07-25

## 2022-07-26 ENCOUNTER — APPOINTMENT (OUTPATIENT)
Dept: GENERAL RADIOLOGY | Facility: HOSPITAL | Age: 57
End: 2022-07-26
Attending: ANESTHESIOLOGY
Payer: COMMERCIAL

## 2022-07-26 ENCOUNTER — TELEPHONE (OUTPATIENT)
Dept: PAIN CLINIC | Facility: CLINIC | Age: 57
End: 2022-07-26

## 2022-07-26 ENCOUNTER — HOSPITAL ENCOUNTER (OUTPATIENT)
Facility: HOSPITAL | Age: 57
Setting detail: HOSPITAL OUTPATIENT SURGERY
Discharge: HOME | End: 2022-07-26
Attending: ANESTHESIOLOGY | Admitting: ANESTHESIOLOGY
Payer: COMMERCIAL

## 2022-07-26 VITALS
TEMPERATURE: 98 F | HEART RATE: 70 BPM | RESPIRATION RATE: 18 BRPM | SYSTOLIC BLOOD PRESSURE: 114 MMHG | DIASTOLIC BLOOD PRESSURE: 81 MMHG | OXYGEN SATURATION: 99 %

## 2022-07-26 DIAGNOSIS — M51.36 DDD (DEGENERATIVE DISC DISEASE), LUMBAR: ICD-10-CM

## 2022-07-26 PROCEDURE — 62323 NJX INTERLAMINAR LMBR/SAC: CPT | Performed by: ANESTHESIOLOGY

## 2022-07-26 PROCEDURE — 3E0S3BZ INTRODUCTION OF ANESTHETIC AGENT INTO EPIDURAL SPACE, PERCUTANEOUS APPROACH: ICD-10-PCS | Performed by: ANESTHESIOLOGY

## 2022-07-26 PROCEDURE — 3E0S33Z INTRODUCTION OF ANTI-INFLAMMATORY INTO EPIDURAL SPACE, PERCUTANEOUS APPROACH: ICD-10-PCS | Performed by: ANESTHESIOLOGY

## 2022-07-26 RX ORDER — DEXAMETHASONE SODIUM PHOSPHATE 10 MG/ML
INJECTION, SOLUTION INTRAMUSCULAR; INTRAVENOUS
Status: DISCONTINUED | OUTPATIENT
Start: 2022-07-26 | End: 2022-07-26

## 2022-07-26 RX ORDER — LIDOCAINE HYDROCHLORIDE 10 MG/ML
INJECTION, SOLUTION EPIDURAL; INFILTRATION; INTRACAUDAL; PERINEURAL
Status: DISCONTINUED | OUTPATIENT
Start: 2022-07-26 | End: 2022-07-26

## 2022-07-26 RX ORDER — SODIUM CHLORIDE 9 MG/ML
INJECTION INTRAVENOUS
Status: DISCONTINUED | OUTPATIENT
Start: 2022-07-26 | End: 2022-07-26

## 2022-07-26 RX ORDER — CELECOXIB 100 MG/1
100 CAPSULE ORAL 2 TIMES DAILY
Qty: 60 CAPSULE | Refills: 1 | Status: SHIPPED | OUTPATIENT
Start: 2022-07-26

## 2022-07-26 NOTE — OPERATIVE REPORT
[facility]  Operative Report  2022     Shalom Ramachandran [facility] [place] Patient Status:  Hospital Outpatient Surgery    10/20/1965 MRN [id number]   Location [address] [address] Attending Suresh Guzmán MD   Hosp Day # 0 PCP Moses Tao MD     Indication: Phong Morales is a 55year old female with lumbar degenerative disc disease    Preoperative Diagnosis:  DDD (degenerative disc disease), lumbar [M51.36]    Postoperative Diagnosis: Same as above. Procedure performed: LUMBAR INTERLAMINAR EPIDURAL INJECTION    Anesthesia: Local .    EBL: Less than 1 ml. Procedure Description:  After reviewing the patient's history and performing a focused physical examination, the diagnosis and positive previous diagnostic tests were confirmed and contraindications such as infection and coagulopathy were ruled out. Following review of allergies and potential side effects and complications, including but not necessarily limited to infection, allergic reaction, local tissue breakdown, nerve injury, post-dural puncture headache and paresis, the patient consented for the procedure. The patient was brought to the procedure room in prone position. After sterile prep lumbar spine, the L5-S1 interspace was identified with the help of fluoroscopy. Local anesthetic was given by a 25 gauge 1.5 inch needle with 1% lidocaine in that space level. Thereafter, a 20 gauge Tuohy needle was introduced and advanced under fluoroscopy. The epidural space was accessed by using loss of resistance to air technique. The needle position was confirmed with AP and lateral view under fluoroscopy. Omnipaque 180 was injected in 1 mL volume. A good epidurogram was obtained. Thereafter, dexamethasone 10 mg with normal saline of 5 mL in total volume of 6 mL was injected through the Tuohy needle. The needle was flushed with 1 mL lidocaine. The needle was withdrawn with the stylet intact in situ. The needle's tip was intact.   The patient tolerated the procedure very well without significant immediate complication. The patient's back was cleaned and sterile dressing was applied. The patient was discharged with an instruction to a responsible adult after discharge criteria were met. The patient was advised to contact us should any problems happen. The patient was also informed to go to the emergency room immediately if experiencing severe numbness or weakness in the extremities or experiencing bowel or bladder incontinence. Complications: None. Follow up: The patient was followed in the pain clinic as needed basis.           Conchis Dorado MD

## 2022-08-10 ENCOUNTER — OFFICE VISIT (OUTPATIENT)
Dept: PAIN CLINIC | Facility: CLINIC | Age: 57
End: 2022-08-10
Payer: COMMERCIAL

## 2022-08-10 ENCOUNTER — TELEPHONE (OUTPATIENT)
Dept: PAIN CLINIC | Facility: CLINIC | Age: 57
End: 2022-08-10

## 2022-08-10 VITALS — OXYGEN SATURATION: 100 % | DIASTOLIC BLOOD PRESSURE: 70 MMHG | SYSTOLIC BLOOD PRESSURE: 120 MMHG | HEART RATE: 77 BPM

## 2022-08-10 DIAGNOSIS — M51.36 DDD (DEGENERATIVE DISC DISEASE), LUMBAR: ICD-10-CM

## 2022-08-10 DIAGNOSIS — M25.551 HIP PAIN, RIGHT: Primary | ICD-10-CM

## 2022-08-10 PROCEDURE — 3078F DIAST BP <80 MM HG: CPT | Performed by: ANESTHESIOLOGY

## 2022-08-10 PROCEDURE — 3074F SYST BP LT 130 MM HG: CPT | Performed by: ANESTHESIOLOGY

## 2022-08-10 PROCEDURE — 99214 OFFICE O/P EST MOD 30 MIN: CPT | Performed by: ANESTHESIOLOGY

## 2022-08-10 RX ORDER — METHYLPREDNISOLONE 4 MG/1
TABLET ORAL
Qty: 1 EACH | Refills: 0 | Status: SHIPPED | OUTPATIENT
Start: 2022-08-10

## 2022-08-10 NOTE — TELEPHONE ENCOUNTER
Pt called to ask Dr. Fabi Colon if she can continue on her medication Celebrex since he placed her on methylPREDNISolone (MEDROL) 4 MG Oral Tablet Therapy Pack. Please advise.

## 2022-08-10 NOTE — PROGRESS NOTES
Last procedure: LUMBAR INTERLAMINAR EPIDURAL INJECTION  Date: 7/26/22  Percentage of relief obtained: 0%  Duration of relief: none    Current Pain Score: 9/10

## 2022-08-10 NOTE — TELEPHONE ENCOUNTER
Justine Singer MD  You 3 minutes ago (3:53 PM)         Yes       Message sent thru [facility]. Luciana Medina is a 45 y.o. female that was discharged in stable. Pt was accompanied by friend. Pt is driving. The patients diagnosis, condition and treatment were explained to  patient and aftercare instructions were given. The patient verbalized understanding. Patient armband removed and shredded.

## 2022-08-11 ENCOUNTER — TELEPHONE (OUTPATIENT)
Dept: NEUROLOGY | Facility: CLINIC | Age: 57
End: 2022-08-11

## 2022-08-11 NOTE — TELEPHONE ENCOUNTER
Prior authorization request completed for: ANTONI    Authorization # NO PRIOR AUTHORIZATION REQUIRED  Authorization dates: N/A  CPT codes approved: 83225  Number of visits/dates of service approved: 1  Physician: Dr. Leisa Holter   Location: THE [facility]     Patient can be scheduled. Routed to Navigator. Notification or Prior Authorization is not required for the requested services    This [facility] [employer] plan does not currently require a prior authorization for these services. If you have general questions about the prior authorization requirements, please call us at [phone] or visit [employer] > Clinician Resources > Advance and Admission Notification Requirements. The number above acknowledges your notification. Please write this number down for future reference. Notification is not a guarantee of coverage or payment.     Decision ID #:[id number]

## 2022-08-11 NOTE — TELEPHONE ENCOUNTER
Pt wants to know if she has to complete her medrol dose pack and physical therapy. Before she completes her injection?  Or her injection first?

## 2022-08-11 NOTE — TELEPHONE ENCOUNTER
Aime Chris MD  You 2 minutes ago (3:01 PM)         Complete the PT and medications first.     Message text

## 2022-08-17 ENCOUNTER — TELEPHONE (OUTPATIENT)
Dept: PAIN CLINIC | Facility: CLINIC | Age: 57
End: 2022-08-17

## 2022-08-17 NOTE — TELEPHONE ENCOUNTER
Pt called to ask if she can get her injection done first before she completes PT. She states she is unable to start PT for another month. Please advise.

## 2022-08-30 ENCOUNTER — HOSPITAL ENCOUNTER (OUTPATIENT)
Facility: HOSPITAL | Age: 57
Setting detail: HOSPITAL OUTPATIENT SURGERY
Discharge: HOME | End: 2022-08-30
Attending: ANESTHESIOLOGY | Admitting: ANESTHESIOLOGY
Payer: COMMERCIAL

## 2022-08-30 ENCOUNTER — APPOINTMENT (OUTPATIENT)
Dept: GENERAL RADIOLOGY | Facility: HOSPITAL | Age: 57
End: 2022-08-30
Attending: ANESTHESIOLOGY
Payer: COMMERCIAL

## 2022-08-30 VITALS
HEART RATE: 70 BPM | RESPIRATION RATE: 16 BRPM | HEIGHT: 65 IN | OXYGEN SATURATION: 99 % | WEIGHT: 155 LBS | SYSTOLIC BLOOD PRESSURE: 135 MMHG | DIASTOLIC BLOOD PRESSURE: 78 MMHG | TEMPERATURE: 98 F | BODY MASS INDEX: 25.83 KG/M2

## 2022-08-30 DIAGNOSIS — M51.36 DDD (DEGENERATIVE DISC DISEASE), LUMBAR: ICD-10-CM

## 2022-08-30 PROCEDURE — 3E0S3BZ INTRODUCTION OF ANESTHETIC AGENT INTO EPIDURAL SPACE, PERCUTANEOUS APPROACH: ICD-10-PCS | Performed by: ANESTHESIOLOGY

## 2022-08-30 PROCEDURE — 62323 NJX INTERLAMINAR LMBR/SAC: CPT | Performed by: ANESTHESIOLOGY

## 2022-08-30 PROCEDURE — 3E0S33Z INTRODUCTION OF ANTI-INFLAMMATORY INTO EPIDURAL SPACE, PERCUTANEOUS APPROACH: ICD-10-PCS | Performed by: ANESTHESIOLOGY

## 2022-08-30 RX ORDER — SODIUM CHLORIDE 9 MG/ML
INJECTION INTRAVENOUS
Status: DISCONTINUED | OUTPATIENT
Start: 2022-08-30 | End: 2022-08-30

## 2022-08-30 RX ORDER — DEXAMETHASONE SODIUM PHOSPHATE 10 MG/ML
INJECTION, SOLUTION INTRAMUSCULAR; INTRAVENOUS
Status: DISCONTINUED | OUTPATIENT
Start: 2022-08-30 | End: 2022-08-30

## 2022-08-30 RX ORDER — LIDOCAINE HYDROCHLORIDE 10 MG/ML
INJECTION, SOLUTION EPIDURAL; INFILTRATION; INTRACAUDAL; PERINEURAL
Status: DISCONTINUED | OUTPATIENT
Start: 2022-08-30 | End: 2022-08-30

## 2022-08-31 RX ORDER — METHYLPREDNISOLONE 4 MG/1
TABLET ORAL
Qty: 21 EACH | Refills: 0 | OUTPATIENT
Start: 2022-08-31

## 2022-09-03 ENCOUNTER — PATIENT MESSAGE (OUTPATIENT)
Dept: PAIN CLINIC | Facility: CLINIC | Age: 57
End: 2022-09-03

## 2022-09-06 RX ORDER — METHYLPREDNISOLONE 4 MG/1
TABLET ORAL
Qty: 1 EACH | Refills: 0 | Status: SHIPPED | OUTPATIENT
Start: 2022-09-06

## 2022-09-06 NOTE — TELEPHONE ENCOUNTER
From: Gabby Childs  To: Olya Chris MD  Sent: 9/3/2022 2:22 PM CDT  Subject: Medrol Pack    Brittany,    Dr. Yuriy Landis said that he would call in a Medrol Pack for me to start a week after my most recent back injection. Please let me know when he will be calling this in to [facility] on [address].      Thank you so much,  [facility]

## 2022-09-08 ENCOUNTER — TELEPHONE (OUTPATIENT)
Dept: ORTHOPEDICS CLINIC | Facility: CLINIC | Age: 57
End: 2022-09-08

## 2022-09-08 ENCOUNTER — PATIENT MESSAGE (OUTPATIENT)
Dept: ORTHOPEDICS CLINIC | Facility: CLINIC | Age: 57
End: 2022-09-08

## 2022-09-08 DIAGNOSIS — M25.551 RIGHT HIP PAIN: Primary | ICD-10-CM

## 2022-09-08 NOTE — TELEPHONE ENCOUNTER
From: Hever Potter  To: Aleida Berumen MD  Sent: 9/8/2022 10:24 AM CDT  Subject: Hip X-ray     Hi Dr. Freddy Talbert,    I recently had X-rays of my Hip done at [facility]. Please let me know if you are able to access them.  If not, I can  copies of the X-rays at the hospital.    Thank you,  [facility]

## 2022-09-16 ENCOUNTER — OFFICE VISIT (OUTPATIENT)
Dept: PHYSICAL THERAPY | Age: 57
End: 2022-09-16
Attending: ANESTHESIOLOGY
Payer: COMMERCIAL

## 2022-09-16 ENCOUNTER — TELEPHONE (OUTPATIENT)
Dept: PHYSICAL THERAPY | Facility: HOSPITAL | Age: 57
End: 2022-09-16

## 2022-09-16 DIAGNOSIS — M25.551 HIP PAIN, RIGHT: ICD-10-CM

## 2022-09-16 DIAGNOSIS — M51.36 DDD (DEGENERATIVE DISC DISEASE), LUMBAR: ICD-10-CM

## 2022-09-16 PROCEDURE — 97140 MANUAL THERAPY 1/> REGIONS: CPT

## 2022-09-16 PROCEDURE — 97110 THERAPEUTIC EXERCISES: CPT

## 2022-09-16 PROCEDURE — 97161 PT EVAL LOW COMPLEX 20 MIN: CPT

## 2022-09-16 NOTE — PROGRESS NOTES
PHYSICAL THERAPY INITIAL EVALUATION     Date of service: 9/16/2022  Dx: Hip pain, right (M25.551), DDD (degenerative disc disease), lumbar (M51.36)   Insurance: [facility] CORE/NAVIGATE  Insurance Limits: N/A  Visit #: 1  Authorized # of Visits: N/A  POC/Auth Expiration: N/A  Authorizing Physician/Provider: Karel     PATIENT SUMMARY     History/ANTELMO: \"I tore my ACL about 28 years ago. It was a partial tear, never had suregry. On and off for the past 5 years, it's been acting up. I don't think that I have any ACL left. Sometimes I would be walking and I would feel some pain in the right hip. I could stretch it and then I could keep walking. \"     The patient reports a pain in the right glute and the right groin. \"I haven't run in the past 8 months. Running became really difficult so all I do is the elliptical. I have two epidurals in the past few months. I had a hip injection and it was lower than I thought it should've been. I was in so much pain. It's so consuming. \" The patient reports that she does not want back surgery. She is hoping to buy some time before having a hip surgery. DOI/S: chronic insidious onset     PMH: The patient's PMH was reviewed with the patient including allergies, medications, and surgical and medical history. PLF/Personal Goals: The patient is active and works out regularly, 5 days a week. The patient's primary goal is to improve pain management and function to delay hip replacement surgery. OBJECTIVE:     Pain/Symptom Presentation: Patient reports cracking in her hip. Pain at rest: 3-4/10  Pain at worst: 10/10    Outcomes Measure(s): FOTO: [phone]    Activity Measures:  Sitting: Moderate Activity Limitation: Patient is able to sit up to 1 hour before disruption due to hip pain. Deskwork/Computer Work: Moderate Activity Limitation: Patient is able to complete deskwork up to 1 hour before disruption due to hip hip pain. Bending Forward:  Moderate Activity Limitation: Patient is with pain and mobility limitations for bending forward. Standing/Walking After Prolonged Sitting: Moderate Activity Limitation: Patient reports stiffness in her hip upon standing/walking after prolonged sitting that subsides within 15-20 minutes. Standing: Moderate Activity Limitation: Patient is able to stand up to 1 hour before disruption due to symptoms. Walking: Mild Activity Limitation: Patient is able to walk 2 miles daily. Ascending stairs: Mild Activity Limitation: Patient is able to ascend stairs with reciprocal gait pattern, mild deviations   Descending stairs: Mild Activity Limitation: Patient is able to ascend stairs with reciprocal gait pattern, mild deviations     ROM:   Hip Motion PROM AROM    Right Left Right Left   Hip Flexion 120 130 N/A N/A   Hip Extension N/A N/A N/A N/A   Hip ABD N/A N/A N/A N/A   Hip ER (at 90deg hip flex) 50 50 N/A N/A   Hip IR (at 90deg hip flex) 20 20 N/A N/A   *indicates activity was associated with pain    Strength/MMT:  Hip Motion Strength    Right Left   Hip Flexion 4/5 4/5   Hip Extension 4-/5 4/5   Hip ABD 4-/5 4/5   Hip ER 4/5 4/5   Hip IR  4+/5 5/5   *indicates activity was associated with pain     Special Tests:   Low Back Special Tests: Forward spinal flexion: (-)  Spinal extension: (-)  Slump test:(R) (-), (L) (-)  SLR: (R) (-), (L) (-)    Hip Special Tests:   PHIL Test: (R) (-), (L) (-)  FADIR Test: (R) (+), (L) (-)  Hip Scour: (R) (+), (L) (-)  Log Roll Test: (R) (-), (L) (-)    ASSESSMENT:     ELVIS is a 55year old-year-old female that presents to physical therapy evaluation with complaints of right hip and low back pain. The patient is hoping to delay a hip replacement surgery due to limited availability to take time off of work. The patient reports right-sided groin pain and stiffness that limits her ability to sit for extended time periods, prolonged standing, walking long distances, and navigating stairs.  The patient demonstrates deficits in hip mobility and LE strength deficits. The patient led a previously active lifestyle. She works out regularly, but has had to modify her workouts recently due to hip pain. She is unable to run any longer due to hip pain. The patient would benefit from physical therapy to facilitate improved pain and symptom management for improve tolerance to ADL's. Precautions/WB Status: WBAT  Education or Treatment Limitation(s): None  Rehab Potential: Good    TREATMENT:     Initial Evaluation: x 15min     Therapeutic Exercise: x 15min  DL bridging: 2 x 10  Supine wipers: x 10 (B)   Supine hip flexor stretch: x 30\" (B)   S/L clamshell: 2 x 10 (B)  Patient Education: Patient was educated on anatomy and pathophysiology of current condition, rationale for physical therapy, anticipated treatment interventions, prognosis, timeline for recovery, and expected functional outcomes based on evaluative findings. Patient was educated on the importance of compliance with consistent treatment and HEP to achieve mutually established goals. Administered HEP: Issued and reviewed HEP handout, exercise selection, and recommended resistance. Provided verbal and written instructions/cueing for proper technique and common errors/compensations as needed. Neuromuscular Re-education: x 10min  Trigger Point Release: (R) glute max, glute med, TFL, ITB/VL, hip flexor  Supine 90-90 posterior hip joint mobs: Grade 3, 4 x 10\" (B)   Lateral hip traction: Grade 3, 4 x 10\" (b)     Manual Therapy: x 5min  Soft tissue mobilization: (R) glute max, glute med, TFL, ITB/VL, hip flexor    Home Exercise Program: Patient was issued a HEP handout 9/16/2022 including DL bridging, supine wipers, supine hip flexor stretch, and S/L clamshell. Provider Interactions With Patient:   Patient education was provided as described above. All patient's questions were answered and the patient denied further comments, complaints, or concerns upon departure.    Patient was issued an appt list and verbally confirmed the next appt date and time to ensure consistency with physical therapy attendance. Charges: PT Eval Low Complexity Complexity x 1, MT x 1, Therex x1  Total Timed Treatment: 45min       Total Treatment Time: 45min     PLAN OF CARE:      Goals:  Short-Term Goals:  1. The patient will improve hip IR mobility to 30deg to facilitate improved tolerance to trunk rotation activities. Timeframe: 3-4 weeks. Long-Term Goals:  1. The patient will improve hip mobility to facilitate sitting for 3 hours at a time for occupational activities. Timeframe: 4-6 weeks. 2. The patient will improve LE strength and endurance to facilitate walking distances of 3 mile(s) daily for household and community ambulation and general health and wellness activities. Timeframe: 6-8 weeks. 3. Patient will improve LE mobility, strength, and single-leg stabilization to meet strength requirements for reciprocal stair navigation pattern with no asymmetries for ascending and descending 3 flights of stairs daily for household and community ambulation. Timeframe: 6-8 weeks. Plan Frequency / Duration: Patient will be seen for 2x/week for 6 weeks, for a total of 12 visits, over a 90 day period. We will re-evaluate the patient at that time in order to determine functional progress, evaluate short-term goal completion, and establish an updated plan of care. Possible treatment interventions will/may include: Therapeutic Activities, Therapeutic Exercise, Neuromuscular Re-education, Manual Therapy, Home Exercise Program Instruction, Patient Education, Self-Care/Home Management, and Modalities as needed. Patient/Family was advised of these findings, precautions, and treatment options and has agreed to actively participate in planning and for this course of care. Thank you for your referral. Please co-sign or sign and return this letter via fax as soon as possible to [phone].  If you have any questions, please contact me at Dept: [phone]. Sincerely,    X___Emily Amon Mcardle, PT, DPT, SCS, ATC, CSCS____ Date: _____9/16/2022________    Electronically signed by therapist: Dejah Giang, PT  [de-identified] certification required: Yes  I certify the need for these services furnished under this plan of treatment and while under my care.

## 2022-09-16 NOTE — PATIENT INSTRUCTIONS
Patient was issued a HEP handout from [email or web address]. Exercise selection, recommended resistance, and proper form/technique were reviewed with the patient. Patient verbalized understanding/comprehension of instruction and recommendations. View at [email or web address] using code: [email or web address]

## 2022-09-23 ENCOUNTER — OFFICE VISIT (OUTPATIENT)
Dept: PHYSICAL THERAPY | Age: 57
End: 2022-09-23
Attending: ANESTHESIOLOGY

## 2022-09-23 PROCEDURE — 97112 NEUROMUSCULAR REEDUCATION: CPT

## 2022-09-23 PROCEDURE — 97140 MANUAL THERAPY 1/> REGIONS: CPT

## 2022-09-23 PROCEDURE — 97110 THERAPEUTIC EXERCISES: CPT

## 2022-09-23 NOTE — PROGRESS NOTES
Date of Service: 9/23/2022  Dx: Hip pain, right (M25.551), DDD (degenerative disc disease), lumbar (M51.36)           Insurance: [facility] CORE/NAVIGATE  Insurance Limits: N/A  Visit #: 2  Authorized # of Visits: N/A  POC/Auth Expiration: N/A  Date of Last PN: 9/16/22 (Visit #1)  Authorizing Physician/Provider: Marlys Alcaraz MD visit: N/A  Fall Risk: Standard         Precautions: None            Subjective: \"I'm okay. It's not awful. If I didn't have the pain I endured before I would think that I'm in pain. I've had two or three great days and then I have any awful day. Once of the exercises I could not do - the one that I hang my leg off the edge of the bed. \" The patient reports that she has a soft mattress topper and it will irritate her back. She reports that her knee was hurting and she's not sure if it's due to the wiper exercises. She notices a difference in strength on the right side compared to the left. Pain/Symptom Presentation: Patient reports cracking in her hip.     Pain at rest: 3-4/10  Pain at worst: 10/10    Objective:   ROM:   Hip Motion PROM AROM    Right Left Right Left   Hip Flexion 120 130 N/A N/A   Hip Extension N/A N/A N/A N/A   Hip ABD N/A N/A N/A N/A   Hip ER (at 90deg hip flex) 50 50 N/A N/A   Hip IR (at 90deg hip flex) 20 20 N/A N/A   *indicates activity was associated with pain    Strength/MMT:  Hip Motion Strength    Right Left   Hip Flexion 4/5 4/5   Hip Extension 4-/5 4/5   Hip ABD 4-/5 4/5   Hip ER 4/5 4/5   Hip IR  4+/5 5/5   *indicates activity was associated with pain     Treatment:    Therapeutic Exercise: x 20min  Manual hamstring stretch: x 30\" (B)   DL bridging: 2 x 10  Supine wipers: x 10 (B) - cracking in hips   Supine hip flexor stretch: x 30\" (B)   S/L clamshell: 2 x 10 (B), red therband  S/L hip ABD: 2 x 10 (B)    Neuromuscular Re-education: x 15min  Trigger Point Release: (R) glute max, glute med, TFL, ITB/VL, hip flexor  Lumbar PA accessory joint mobs - L1-L5: Grade 3, 4 x 10\" each  Supine 90-90 posterior hip joint mobs: Grade 3, 4 x 10\" (B)   Lateral hip traction: Grade 3, 4 x 10\" (B)     Manual Therapy: x 10min  Soft tissue mobilization: (R) glute max, glute med, TFL, ITB/VL, hip flexor    Provider Interactions With Patient:   Added therapeutic exercises as documented, with cueing provided throughout performance to ensure correct technique during exercise. Assessment: Damon López continues to have complaints of pain and strength deficits on her left hip compared to the right. The patient remains compliant with her HEP. She is still with limitations in hip mobility on her right compared to the left, mostly noted with rotational mobility. She seems to respond well to joint mobilizations to her hip. The patient would benefit from continued therapy for further progression in mobility, strength, and endurance for improved tolerance to ADL's     Goals:   Short-Term Goals:  1. The patient will improve hip IR mobility to 30deg to facilitate improved tolerance to trunk rotation activities. Timeframe: 3-4 weeks. Long-Term Goals:  1. The patient will improve hip mobility to facilitate sitting for 3 hours at a time for occupational activities. Timeframe: 4-6 weeks. 2. The patient will improve LE strength and endurance to facilitate walking distances of 3 mile(s) daily for household and community ambulation and general health and wellness activities. Timeframe: 6-8 weeks. 3. Patient will improve LE mobility, strength, and single-leg stabilization to meet strength requirements for reciprocal stair navigation pattern with no asymmetries for ascending and descending 3 flights of stairs daily for household and community ambulation. Timeframe: 6-8 weeks. Plan: Continue to progress hip mobility, strength, and stability. HEP: Patient was issued a HEP handout 9/16/2022 including DL bridging, supine wipers, supine hip flexor stretch, and S/L clamshell. Verbally added S/L hip ABD 9/23/22. Charges: NMR x 1, Therex x 1, MT x 1         Total Timed Treatment: 45min    Total Treatment Time: 45min

## 2022-09-26 ENCOUNTER — TELEPHONE (OUTPATIENT)
Dept: INTERNAL MEDICINE CLINIC | Facility: CLINIC | Age: 57
End: 2022-09-26

## 2022-09-26 DIAGNOSIS — Z13.0 SCREENING FOR DISORDER OF BLOOD AND BLOOD-FORMING ORGANS: ICD-10-CM

## 2022-09-26 DIAGNOSIS — Z00.00 ROUTINE GENERAL MEDICAL EXAMINATION AT A HEALTH CARE FACILITY: Primary | ICD-10-CM

## 2022-09-26 DIAGNOSIS — Z13.228 SCREENING FOR METABOLIC DISORDER: ICD-10-CM

## 2022-09-26 DIAGNOSIS — Z13.29 SCREENING FOR THYROID DISORDER: ICD-10-CM

## 2022-09-26 DIAGNOSIS — Z13.220 SCREENING FOR LIPID DISORDERS: ICD-10-CM

## 2022-09-26 NOTE — TELEPHONE ENCOUNTER
Future Appointments   Date Time Provider Ester Mary   10/27/2022  1:40 PM URBANO Juarez [facility]     Orders to edward- Pt informed that labs need to be completed no sooner than 2 weeks prior to the appt.  Pt aware to fast-no call back required

## 2022-09-28 ENCOUNTER — OFFICE VISIT (OUTPATIENT)
Dept: PHYSICAL THERAPY | Age: 57
End: 2022-09-28
Attending: ANESTHESIOLOGY

## 2022-09-28 PROCEDURE — 97110 THERAPEUTIC EXERCISES: CPT

## 2022-09-28 PROCEDURE — 97112 NEUROMUSCULAR REEDUCATION: CPT

## 2022-09-28 PROCEDURE — 97140 MANUAL THERAPY 1/> REGIONS: CPT

## 2022-09-28 NOTE — PROGRESS NOTES
Date of Service: 9/28/2022  Dx: Hip pain, right (M25.551), DDD (degenerative disc disease), lumbar (M51.36)  Insurance: [facility] CORE/NAVIGATE  Insurance Limits: N/A  Visit #: 3  Authorized # of Visits: N/A  POC/Auth Expiration: N/A  Date of Last PN: 9/16/22 (Visit #1)  Authorizing Physician/Provider: Nanci Alcaraz MD visit: N/A  Fall Risk: Standard         Precautions: None            Subjective: \"I'm okay. I have this awful pain in my mid-back and it just won't quit. It's annoying. Pain/Symptom Presentation: Patient reports cracking in her hip.     Pain at rest: 3-4/10  Pain at worst: 10/10    Objective:   ROM:   Hip Motion PROM AROM    Right Left Right Left   Hip Flexion 120 130 N/A N/A   Hip Extension N/A N/A N/A N/A   Hip ABD N/A N/A N/A N/A   Hip ER (at 90deg hip flex) 50 50 N/A N/A   Hip IR (at 90deg hip flex) 20 20 N/A N/A   *indicates activity was associated with pain    Strength/MMT:  Hip Motion Strength    Right Left   Hip Flexion 4/5 4/5   Hip Extension 4-/5 4/5   Hip ABD 4-/5 4/5   Hip ER 4/5 4/5   Hip IR  4+/5 5/5   *indicates activity was associated with pain     Treatment:    Therapeutic Exercise: x 20min  Manual hamstring stretch: x 30\" (B)   DL bridging: 2 x 10  Supine wipers: x 10 (B) - cracking in hips   Supine hip flexor stretch: x 30\" (B)   S/L clamshell: 2 x 10 (B), red therband  S/L reverse clamshell: x 20 (B)   S/L hip ABD: 2 x 10 (B)   DL wall sit x 30\"   HEP update: Reviewed new HEP handout with new exercises and progressions, provided verbal and written instructions/cueing for proper technique and common errors/compensations as needed    Neuromuscular Re-education: x 15min  Trigger Point Release: (R) glute max, glute med, TFL, ITB/VL, hip flexor  Thoracic PA accessory joint mobs - T1-T12: Grade 3, 4 x 10\" each  Lumbar PA accessory joint mobs - L1-L5: Grade 3, 4 x 10\" each  Supine 90-90 posterior hip joint mobs: Grade 3, 4 x 10\" (B)   Lateral hip traction: Grade 3, 4 x 10\" (B)   Dead bug nicole SB squeeze: 1 x 10 x 3\" (B)     Manual Therapy: x 10min  Soft tissue mobilization: (R) glute max, glute med, TFL, ITB/VL, hip flexor    Provider Interactions With Patient:   Provided patient with a written copy of exercise instruction which was also documented in patient's electronic medical chart. Assessment: Patrick De La Cruz was with some mid-back pain upon arriving to her appt today. The patient was treated with additional joint mobilizations for her thoracic spine. The patient was taken through additional glute strengthening and core stabilization exercises this date with good tolerance. The patient feels that she has been struggling to see improvements in strength with the leg press at the gym. Discussed the option of doing a DL wall squat nicole hold for additional quad strengthening that may be less irritating than the [facility] machine, but may give additional load compared to the leg press machine. The patient inquired about other modalities. Per patient request, we will add electrical stimulation next session. The patient was educated that this modality is mostly used for pain management and will not, by itself, cure her condition or resolve her pain. Goals:   Short-Term Goals:  1. The patient will improve hip IR mobility to 30deg to facilitate improved tolerance to trunk rotation activities. Timeframe: 3-4 weeks. Long-Term Goals:  1. The patient will improve hip mobility to facilitate sitting for 3 hours at a time for occupational activities. Timeframe: 4-6 weeks. 2. The patient will improve LE strength and endurance to facilitate walking distances of 3 mile(s) daily for household and community ambulation and general health and wellness activities. Timeframe: 6-8 weeks.   3. Patient will improve LE mobility, strength, and single-leg stabilization to meet strength requirements for reciprocal stair navigation pattern with no asymmetries for ascending and descending 3 flights of stairs daily for household and community ambulation. Timeframe: 6-8 weeks. Plan: Add electrical stimulation next session. HEP: Patient was issued a HEP handout 9/28/22 including DL bridging, S/L clamshell, S/L reverse clamshell, dead bug nicole SB squeeze, S/L hip ABD, and supine wipers.      Charges: NMR x 1, Therex x 1, MT x 1         Total Timed Treatment: 45min    Total Treatment Time: 45min

## 2022-09-28 NOTE — PATIENT INSTRUCTIONS
Patient was issued a HEP handout from [email or web address]. Exercise selection, recommended resistance, and proper form/technique were reviewed with the patient. Patient verbalized understanding/comprehension of instruction and recommendations. View at [email or web address] using code: [email or web address]

## 2022-09-30 ENCOUNTER — OFFICE VISIT (OUTPATIENT)
Dept: PHYSICAL THERAPY | Age: 57
End: 2022-09-30
Attending: ANESTHESIOLOGY

## 2022-09-30 PROCEDURE — 97112 NEUROMUSCULAR REEDUCATION: CPT

## 2022-09-30 PROCEDURE — 97140 MANUAL THERAPY 1/> REGIONS: CPT

## 2022-09-30 PROCEDURE — 97014 ELECTRIC STIMULATION THERAPY: CPT

## 2022-09-30 NOTE — PROGRESS NOTES
Date of Service: 9/30/2022  Dx: Hip pain, right (M25.551), DDD (degenerative disc disease), lumbar (M51.36)  Insurance: [facility] CORE/NAVIGATE  Insurance Limits: N/A  Visit #: 4  Authorized # of Visits: N/A  POC/Auth Expiration: N/A  Date of Last PN: 9/16/22 (Visit #1)  Authorizing Physician/Provider: Surendra Alcaraz MD visit: N/A  Fall Risk: Standard         Precautions: None            Subjective: \"I'm having a really hard time walking today. I wonder at time if there is something going on in my glute. I have pain there and it goes down the leg. \"     Pain/Symptom Presentation: Patient reports cracking in her hip.     Pain at rest: 3-4/10  Pain at worst: 10/10    Objective:   ROM:   Hip Motion PROM AROM    Right Left Right Left   Hip Flexion 120 130 N/A N/A   Hip Extension N/A N/A N/A N/A   Hip ABD N/A N/A N/A N/A   Hip ER (at 90deg hip flex) 50 50 N/A N/A   Hip IR (at 90deg hip flex) 20 20 N/A N/A   *indicates activity was associated with pain    Strength/MMT:  Hip Motion Strength    Right Left   Hip Flexion 4/5 4/5   Hip Extension 4-/5 4/5   Hip ABD 4-/5 4/5   Hip ER 4/5 4/5   Hip IR  4+/5 5/5   *indicates activity was associated with pain     Treatment:    Therapeutic Exercise: x 6min  Hip PROM - IR/ER x 10 (R)   Prone quad stretch: x 30\" (B)   Manual hamstring stretch: x 30\" (B)   DL bridging: 2 x 10    Neuromuscular Re-education: x 15min  Trigger Point Release: (R) glute max, glute med, TFL, ITB/VL, hip flexor  Thoracic PA accessory joint mobs - T1-T12: Grade 3, 4 x 10\" each  Lumbar PA accessory joint mobs - L1-L5: Grade 3, 4 x 10\" each  Supine 90-90 posterior hip joint mobs: Grade 3, 4 x 10\" (B)   Lateral hip traction: Grade 3, 6 x 10\" (B)   Dead bug nicole SB squeeze: 1 x 10 x 3\" (B)     Manual Therapy: x 14min  Soft tissue mobilization: (R) glute max, glute med, TFL, ITB/VL, hip flexor    Electrical Stimulation: IFC x 10min    Provider Interactions With Patient:   Provided patient with a written copy of exercise instruction which was also documented in patient's electronic medical chart. Assessment: Dearl Babita was with increased pain complaints this session. She demonstrates some stiffness upon waling into the clinic. We spent additional time working on pain management this session. We also tried electrical stimulation modality treatment this date for additional pain management. The patient is still with apparent mobility deficits at her right hip in comparison to her left and would benefit from continued therapy for further progression in hip mobility and LE and core strength for improved tolerance to ADL's. Goals:   Short-Term Goals:  1. The patient will improve hip IR mobility to 30deg to facilitate improved tolerance to trunk rotation activities. Timeframe: 3-4 weeks. Long-Term Goals:  1. The patient will improve hip mobility to facilitate sitting for 3 hours at a time for occupational activities. Timeframe: 4-6 weeks. 2. The patient will improve LE strength and endurance to facilitate walking distances of 3 mile(s) daily for household and community ambulation and general health and wellness activities. Timeframe: 6-8 weeks. 3. Patient will improve LE mobility, strength, and single-leg stabilization to meet strength requirements for reciprocal stair navigation pattern with no asymmetries for ascending and descending 3 flights of stairs daily for household and community ambulation. Timeframe: 6-8 weeks. Plan: Follow up on tolerance and response to electrical stimulation treatment. HEP: Patient was issued a HEP handout 9/28/22 including DL bridging, S/L clamshell, S/L reverse clamshell, dead bug nicole SB squeeze, S/L hip ABD, and supine wipers.      Charges: NMR x 1, MT x 1, Electrical Stimualtion x 1  Total Timed Treatment: 45min    Total Treatment Time: 45min

## 2022-10-05 ENCOUNTER — OFFICE VISIT (OUTPATIENT)
Dept: PHYSICAL THERAPY | Age: 57
End: 2022-10-05
Attending: ANESTHESIOLOGY
Payer: COMMERCIAL

## 2022-10-05 PROCEDURE — 97110 THERAPEUTIC EXERCISES: CPT

## 2022-10-05 PROCEDURE — 97112 NEUROMUSCULAR REEDUCATION: CPT

## 2022-10-05 PROCEDURE — 97140 MANUAL THERAPY 1/> REGIONS: CPT

## 2022-10-05 NOTE — PROGRESS NOTES
Date of Service: 10/5/2022  Dx: Hip pain, right (M25.551), DDD (degenerative disc disease), lumbar (M51.36)  Insurance: [facility] CORE/NAVIGATE  Insurance Limits: N/A  Visit #: 5  Authorized # of Visits: N/A  POC/Auth Expiration: N/A  Date of Last PN: 9/16/22 (Visit #1)  Authorizing Physician/Provider: Breann Alcaraz MD visit: N/A  Fall Risk: Standard         Precautions: None            Subjective: \"The hip is better than it was last time. I think I have to refrain and that's so sad. I'm okay. Today is a good day. I'm so cautiously optimistic but then I have days where it's bad. \" The patient was able to 50 and 40 minutes on the elliptical since her last appt. The patient reports that she thinks that she felt better after the electrical stimulation. The patient is complaining of pain in her knee and her upper back as well.      Pain/Symptom Presentation:    Pain at rest: 3-4/10  Pain at worst: 10/10    Objective:   ROM:   Hip Motion PROM AROM    Right Left Right Left   Hip Flexion 120 130 N/A N/A   Hip Extension N/A N/A N/A N/A   Hip ABD N/A N/A N/A N/A   Hip ER (at 90deg hip flex) 50 50 N/A N/A   Hip IR (at 90deg hip flex) 20 20 N/A N/A   *indicates activity was associated with pain    Strength/MMT:  Hip Motion Strength    Right Left   Hip Flexion 4/5 4/5   Hip Extension 4-/5 4/5   Hip ABD 4-/5 4/5   Hip ER 4/5 4/5   Hip IR  4+/5 5/5   *indicates activity was associated with pain     Treatment:    Therapeutic Exercise: x 20min  Manual hamstring stretch: x 30\" (B) - complained of pain in the left posterolateral knee   Hip PROM - IR/ER x 10 (R)   Prone quad stretch: x 30\" (B)   Manual hamstring stretch: x 30\" (B)   DL bridging: 2 x 10  S/L clamshell: 2 x 10 (B), red therband  S/L reverse clamshell: x 20 (B)   S/L thoracic rotation: 1 x 10 (B)   S/L hip ABD: 2 x 10 (B)   Modified downward dog: 1 x 10 x 3\" - patient reports she is limited by (R) hip mobility for this exercise   HEP update: Reviewed new HEP handout with new exercises and progressions, provided verbal and written instructions/cueing for proper technique and common errors/compensations as needed    Neuromuscular Re-education: x 15min  Trigger Point Release: (R) glute max, glute med, TFL, ITB/VL, hip flexor  Thoracic PA accessory joint mobs - T1-T12: Grade 3, 4 x 10\" each  Lumbar PA accessory joint mobs - L1-L5: Grade 3, 4 x 10\" each  Supine 90-90 posterior hip joint mobs: Grade 3, 4 x 10\" (B)   Lateral hip traction: Grade 3, 4 x 10\" (B)   Dead bug nicole SB squeeze: 1 x 10 x 3\" (B)   90-90 eccentric leg lowering: 1 x 10 (B)     Manual Therapy: x 10min  Soft tissue mobilization: (R) glute max, glute med, TFL, ITB/VL, hip flexor    Provider Interactions With Patient:   Patient as issued another copy of her HEP handout per her request.   Provided patient with a written copy of exercise instruction which was also documented in patient's electronic medical chart. Assessment: Damon López was educated that she is likely to have fluctuations in her symptoms through her recovery. The patient was more vocal about bilateral knee pain as well as pain in her upper back this session. The patient likely has multiple conditions going on between her low back, hip, upper back, and knee pain complaints. The patient was educated on the relationship between the upper back, low back, hip and knees. We added in some additional thoracic spine mobility exercises this date which she tolerated well. Dmaon López continues to have difficulty in strength on her right side compared to the left side during her workouts. She is adamant that she cannot take the time to have a hip replacement, but is to consult with another MD. The patient would benefit from continued therapy to continue working on hip mobility and core and LE strength for improved tolerance to ADL's. Goals:   Short-Term Goals:  1. The patient will improve hip IR mobility to 30deg to facilitate improved tolerance to trunk rotation activities. Timeframe: 3-4 weeks. Long-Term Goals:  1. The patient will improve hip mobility to facilitate sitting for 3 hours at a time for occupational activities. Timeframe: 4-6 weeks. 2. The patient will improve LE strength and endurance to facilitate walking distances of 3 mile(s) daily for household and community ambulation and general health and wellness activities. Timeframe: 6-8 weeks. 3. Patient will improve LE mobility, strength, and single-leg stabilization to meet strength requirements for reciprocal stair navigation pattern with no asymmetries for ascending and descending 3 flights of stairs daily for household and community ambulation. Timeframe: 6-8 weeks. Plan: Follow up on tolerance to updated HEP. HEP: Patient was issued a HEP handout 10/5/22 including S/L thoracic rotation, modified downward dog, and eccentric 90-90 lowering, to be added to HEP handout 9/28/22 including DL bridging, S/L clamshell, S/L reverse clamshell, dead bug nicole SB squeeze, S/L hip ABD, and supine wipers.      Charges: NMR x 1, MT x 1, Therex x 1   Total Timed Treatment: 45min    Total Treatment Time: 45min

## 2022-10-05 NOTE — PATIENT INSTRUCTIONS
Patient was issued a HEP handout from [email or web address]. Exercise selection, recommended resistance, and proper form/technique were reviewed with the patient. Patient verbalized understanding/comprehension of instruction and recommendations. View at [email or web address] using code: [email or web address]

## 2022-10-10 ENCOUNTER — OFFICE VISIT (OUTPATIENT)
Dept: ORTHOPEDICS CLINIC | Facility: CLINIC | Age: 57
End: 2022-10-10
Payer: COMMERCIAL

## 2022-10-10 VITALS — BODY MASS INDEX: 25.99 KG/M2 | HEIGHT: 65 IN | OXYGEN SATURATION: 99 % | HEART RATE: 73 BPM | WEIGHT: 156 LBS

## 2022-10-10 DIAGNOSIS — M16.11 PRIMARY OSTEOARTHRITIS OF RIGHT HIP: Primary | ICD-10-CM

## 2022-10-10 RX ORDER — TRIAMCINOLONE ACETONIDE 40 MG/ML
40 INJECTION, SUSPENSION INTRA-ARTICULAR; INTRAMUSCULAR ONCE
Status: COMPLETED | OUTPATIENT
Start: 2022-10-10 | End: 2022-10-10

## 2022-10-10 RX ADMIN — TRIAMCINOLONE ACETONIDE 40 MG: 40 INJECTION, SUSPENSION INTRA-ARTICULAR; INTRAMUSCULAR at 17:16:00

## 2022-10-13 ENCOUNTER — OFFICE VISIT (OUTPATIENT)
Dept: PHYSICAL THERAPY | Age: 57
End: 2022-10-13
Attending: ANESTHESIOLOGY
Payer: COMMERCIAL

## 2022-10-13 PROCEDURE — 97140 MANUAL THERAPY 1/> REGIONS: CPT

## 2022-10-13 PROCEDURE — 97112 NEUROMUSCULAR REEDUCATION: CPT

## 2022-10-13 PROCEDURE — 97110 THERAPEUTIC EXERCISES: CPT

## 2022-10-13 NOTE — PROGRESS NOTES
Date of Service: 10/13/2022  Dx: Hip pain, right (M25.551), DDD (degenerative disc disease), lumbar (M51.36)  Insurance: [facility] CORE/NAVIGATE  Insurance Limits: N/A  Visit #: 6  Authorized # of Visits: N/A  POC/Auth Expiration: N/A  Date of Last PN: 9/16/22 (Visit #1)  Authorizing Physician/Provider: Elsa Alcaraz MD visit: N/A  Fall Risk: Standard         Precautions: None            Subjective: States that she saw Dr Jaron Chacko for a consult 3 days ago. 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 States she feels more improvement in her hip pain after the injection and that she is a lot better but not near where she wants to be. Traveling to [place] this weekend for a wedding so she says she is nervous about all the standing and walking. Pain/Symptom Presentation:    Pain at rest: 3-4/10  Pain at worst: 10/10    Objective:   ROM:   Hip Motion PROM AROM    Right Left Right Left   Hip Flexion 120 130 N/A N/A   Hip Extension N/A N/A N/A N/A   Hip ABD N/A N/A N/A N/A   Hip ER (at 90deg hip flex) 50 50 N/A N/A   Hip IR (at 90deg hip flex) 20 20 N/A N/A   *indicates activity was associated with pain    Strength/MMT:  Hip Motion Strength    Right Left   Hip Flexion 4/5 4/5   Hip Extension 4-/5 4/5   Hip ABD 4-/5 4/5   Hip ER 4/5 4/5   Hip IR  4+/5 5/5   *indicates activity was associated with pain     Treatment:  Therapeutic Exercise: x 20min   Hip PROM - IR/ER x 10 (R)   Prone quad stretch: x 30\" (B)   Manual hamstring stretch: x 30\" (B)   DL bridging: 2 x 10  Prone: quad stretch 5 x20 sec (R)   S/L hip ABD: 2 x 10 (B) , RTB around thighs  Prone: R hip IR/ER PROM 2x10 ea    HEP update: RTB for s/l hip abd    Neuromuscular Re-education: x 10min  Hkly: pelvic tilt 10x10 sec  Hkly:  TrA with bilat hip abd GTB 2x10, 3\" hold  S/L clamshell: 2 x 10 (B), red therband  S/L reverse clamshell: x 20 (B)   Reformer 4 bands DL knee extension with TrA 3x20 reps    Manual Therapy: x 15min  Mulligan hip mobilizations lateral and inferior GR 3-4 10 x10 sec bouts ea  Supine: right hip long axis hip distraction 8 x10 sec bouts grade 3  Prone: Right hip PA mobilizations Gr 3-4 6 x10 sec bouts      Provider Interactions/Education With Patient:   Education regarding OA, pacing of activities, use of cold pack and NWB modes of exercises (bike, pool)    Assessment: Pt completed todays treatment without complaints of increased pain or discomfort following treatment. She tolerated manual and exercise progressions very well today. She was educated on OA diagnosis, pacing of activities, use of cold pack and NWB modes of exercise which can help reduce her daily symptoms. Pt states understanding. She still has hip and core strength deficits. The patient would benefit from continued therapy to continue working on hip mobility and core and LE strength for improved tolerance to ADL's. Goals:   Short-Term Goals:  1. The patient will improve hip IR mobility to 30deg to facilitate improved tolerance to trunk rotation activities. Timeframe: 3-4 weeks. Long-Term Goals:  1. The patient will improve hip mobility to facilitate sitting for 3 hours at a time for occupational activities. Timeframe: 4-6 weeks. 2. The patient will improve LE strength and endurance to facilitate walking distances of 3 mile(s) daily for household and community ambulation and general health and wellness activities. Timeframe: 6-8 weeks. 3. Patient will improve LE mobility, strength, and single-leg stabilization to meet strength requirements for reciprocal stair navigation pattern with no asymmetries for ascending and descending 3 flights of stairs daily for household and community ambulation. Timeframe: 6-8 weeks. Plan: Follow up on tolerance to updated HEP.      HEP: Patient was issued a HEP handout 10/5/22 including S/L thoracic rotation, modified downward dog, and eccentric 90-90 lowering, to be added to HEP handout 9/28/22 including DL bridging, S/L clamshell, S/L reverse clamshell, dead bug nicole SB squeeze, S/L hip ABD, and supine wipers.      Charges: NMR x 1, MT x 1, Therex x 1   Total Timed Treatment: 45min    Total Treatment Time: 45min

## 2022-10-17 DIAGNOSIS — M25.551 HIP PAIN, CHRONIC, RIGHT: ICD-10-CM

## 2022-10-17 DIAGNOSIS — M25.562 CHRONIC PAIN OF BOTH KNEES: ICD-10-CM

## 2022-10-17 DIAGNOSIS — G89.29 CHRONIC PAIN OF BOTH KNEES: ICD-10-CM

## 2022-10-17 DIAGNOSIS — M25.561 CHRONIC PAIN OF BOTH KNEES: ICD-10-CM

## 2022-10-17 DIAGNOSIS — M51.36 DDD (DEGENERATIVE DISC DISEASE), LUMBAR: Primary | ICD-10-CM

## 2022-10-17 DIAGNOSIS — G89.29 HIP PAIN, CHRONIC, RIGHT: ICD-10-CM

## 2022-10-18 RX ORDER — CELECOXIB 100 MG/1
CAPSULE ORAL
Qty: 60 CAPSULE | Refills: 1 | Status: SHIPPED | OUTPATIENT
Start: 2022-10-18

## 2022-10-19 ENCOUNTER — OFFICE VISIT (OUTPATIENT)
Dept: PHYSICAL THERAPY | Age: 57
End: 2022-10-19
Attending: ANESTHESIOLOGY
Payer: COMMERCIAL

## 2022-10-19 PROCEDURE — 97112 NEUROMUSCULAR REEDUCATION: CPT

## 2022-10-19 PROCEDURE — 97140 MANUAL THERAPY 1/> REGIONS: CPT

## 2022-10-19 PROCEDURE — 97110 THERAPEUTIC EXERCISES: CPT

## 2022-10-19 NOTE — PROGRESS NOTES
Date of Service: 10/19/2022  Dx: Hip pain, right (M25.551), DDD (degenerative disc disease), lumbar (M51.36)  Insurance: [facility] CORE/NAVIGATE  Insurance Limits: N/A  Visit #: 7  Authorized # of Visits: N/A  POC/Auth Expiration: N/A  Date of Last PN: 9/16/22 (Visit #1)  Authorizing Physician/Provider: Surendra Alcaraz MD visit: N/A  Fall Risk: Standard         Precautions: None            Subjective: The patient reports that she had an injection in the hip and she is feeling better. The patient was able to walk 30 minutes on the treadmill at 3. 4mph though. She went to a wedding and was able to walk quite a bit even in a heel. \"I still feel that I'm weaker on that side. I had a great workout with Simki and I really liked the traction with the belt. I'm feeling better, but I'm cautious. \"     Pain/Symptom Presentation:    Pain at rest: 3-4/10  Pain at worst: 10/10    Objective:   ROM:   Hip Motion PROM AROM    Right Left Right Left   Hip Flexion 120 130 N/A N/A   Hip Extension N/A N/A N/A N/A   Hip ABD N/A N/A N/A N/A   Hip ER (at 90deg hip flex) 50 50 N/A N/A   Hip IR (at 90deg hip flex) 20 20 N/A N/A   *indicates activity was associated with pain    Strength/MMT:  Hip Motion Strength    Right Left   Hip Flexion 4/5 4/5   Hip Extension 4-/5 4/5   Hip ABD 4-/5 4/5   Hip ER 4/5 4/5   Hip IR  4+/5 5/5   *indicates activity was associated with pain     Treatment:    Therapeutic Exercise: x 12min   Hip PROM - IR/ER x 10 (R)   Prone quad stretch: x 30\" (B)   Manual hamstring stretch: x 30\" (B)   DL bridging: 2 x 10  S/L hip ABD: 2 x 10 (B) , RTB around thighs    Neuromuscular Re-education: x 15min  Trigger point release: (R) glute max, glute med, piriformis  Hooklying PPT: 2 x 10 x 3\"   Hkly:  TrA with bilat hip abd GTB 2x10, 3\" hold  S/L clamshell: 2 x 10 (B), red therband  S/L reverse clamshell: x 20 (B)     Manual Therapy: x 15min  Mulligan belt hip mobilizations lateral and inferior: Grade 3, 4 x 10\" (R)  Right hip long axis hip distraction with belt: Grade 3, 4 x 10\" (R)  Prone: Right hip PA mobilizations: Grade 3, 4 x 10\" (R)  Lumbar PA accessory joint mobs - L1-L5: Grade 3, 4 x 10\" each    Provider Interactions/Education With Patient:   Provided patient with a written copy of exercise instruction which was also documented in patient's electronic medical chart. Assessment: Josiane Salcedo arrives today after a follow-up appt with her orthopedic MD who did an injection for her hip. The patient reports improved pain management and tolerance to walking long distances. She still is with reports of weakness with the leg press machine at the gym. She enjoyed the additional hip joint mobilizations performed with the belt last session so we continued that this session. The patient would benefit from continued therapy to facilitate further progression in strength for ADL tolerance. Goals:   Short-Term Goals:  1. The patient will improve hip IR mobility to 30deg to facilitate improved tolerance to trunk rotation activities. Timeframe: 3-4 weeks. Long-Term Goals:  1. The patient will improve hip mobility to facilitate sitting for 3 hours at a time for occupational activities. Timeframe: 4-6 weeks. 2. The patient will improve LE strength and endurance to facilitate walking distances of 3 mile(s) daily for household and community ambulation and general health and wellness activities. Timeframe: 6-8 weeks. 3. Patient will improve LE mobility, strength, and single-leg stabilization to meet strength requirements for reciprocal stair navigation pattern with no asymmetries for ascending and descending 3 flights of stairs daily for household and community ambulation. Timeframe: 6-8 weeks. Plan: Progress summary next session.       HEP: Patient was issued a HEP handout 10/5/22 including S/L thoracic rotation, modified downward dog, and eccentric 90-90 lowering, to be added to HEP handout 9/28/22 including DL bridging, S/L clamshell, S/L reverse rachele, dead bug nicole SB squeeze, S/L hip ABD, and supine wipers.      Charges: NMR x 1, MT x 1, Therex x 1   Total Timed Treatment: 43min    Total Treatment Time: 43min

## 2022-10-21 ENCOUNTER — OFFICE VISIT (OUTPATIENT)
Dept: PHYSICAL THERAPY | Age: 57
End: 2022-10-21
Attending: ANESTHESIOLOGY
Payer: COMMERCIAL

## 2022-10-21 PROCEDURE — 97112 NEUROMUSCULAR REEDUCATION: CPT

## 2022-10-21 PROCEDURE — 97140 MANUAL THERAPY 1/> REGIONS: CPT

## 2022-10-21 PROCEDURE — 97110 THERAPEUTIC EXERCISES: CPT

## 2022-10-21 NOTE — PROGRESS NOTES
Date of Service: 10/21/2022  Dx: Hip pain, right (M25.551), DDD (degenerative disc disease), lumbar (M51.36)  Insurance: [facility] CORE/NAVIGATE  Insurance Limits: N/A  Visit #: 8  Authorized # of Visits: N/A  POC/Auth Expiration: N/A  Date of Last PN: 9/16/22 (Visit #1)  Authorizing Physician/Provider: Duke Alcaraz MD visit: N/A  Fall Risk: Standard         Precautions: None            Subjective: \"I wore heels for my birthday and I was thinking 'Wow, there really is hope.' I guess I have to realize that I'm never going to be the way that I was. \"     Pain/Symptom Presentation:    Pain at rest: 3-4/10  Pain at worst: 10/10    Objective:   ROM:   Hip Motion PROM AROM    Right Left Right Left   Hip Flexion 120 130 N/A N/A   Hip Extension N/A N/A N/A N/A   Hip ABD N/A N/A N/A N/A   Hip ER (at 90deg hip flex) 50 50 N/A N/A   Hip IR (at 90deg hip flex) 20 20 N/A N/A   *indicates activity was associated with pain    Strength/MMT:  Hip Motion Strength    Right Left   Hip Flexion 4/5 4/5   Hip Extension 4-/5 4/5   Hip ABD 4-/5 4/5   Hip ER 4/5 4/5   Hip IR  4+/5 5/5   *indicates activity was associated with pain     Treatment:    Therapeutic Exercise: x 20min   Hip PROM - IR/ER x 10 (R)   Prone quad stretch: x 30\" (R)   Sidelying hip flexor stretch: x 30\" (R)   Manual hamstring stretch: x 30\" (B)   Prone hip extension (leg straight): 2 x 10 (B)   DL bridging march: 1 x 10  S/L hip ABD: 2 x 10 (B) , RTB around thighs  HEP update: Reviewed new HEP handout with new exercises and progressions, provided verbal and written instructions/cueing for proper technique and common errors/compensations as needed    Neuromuscular Re-education: x 20min  Trigger point release: (R) glute max, glute med, piriformis, hip flexor/iliospoas, ITB/VL  Dead bug nicole SB squeeze: 1 x 10 x 3\"   Hkly:  TrA with bilat hip abd GTB 2x10, 3\" hold  S/L clamshell: 2 x 10 (B), red theraband  S/L reverse clamshell: x 20 (B)     Manual Therapy: x 15min  [facility] mobilizations - lateral and inf: Grade 3, 4 x 10\" (R)  Mulligan belt hip mobilizations - inferior: Grade 3, 4 x 10\" (R)  Right hip long axis hip distraction with belt: Grade 3, 3 x 30\" (R)  Supine 90-90 posterior hip joint mobs: Grade 3, 4 x 10\" (R)   Lumbar PA accessory joint mobs - L1-L5: Grade 3, 4 x 10\" each    Provider Interactions/Education With Patient:   Provided patient with a written copy of exercise instruction which was also documented in patient's electronic medical chart. Assessment: Emma Watson is in better spirits and is noticing improvements with physical therapy. She was able to wear heels again for her birthday with improved tolerance. We again discussed realistic expectations for recovery given the progressive nature of her condition and diagnosis. She was taken through additional hip stretching exercises to facilitate more mobility at her hips, including hip flexor stretching. The patient notes a cracking in her hip when long axis traction is released. We also progressed glute strengthening exercises and updated her HEP. We will continue to progress hip mobility, strength, and stability to facilitate improved tolerance to ADL's. Goals:   Short-Term Goals:  1. The patient will improve hip IR mobility to 30deg to facilitate improved tolerance to trunk rotation activities. Timeframe: 3-4 weeks. Long-Term Goals:  1. The patient will improve hip mobility to facilitate sitting for 3 hours at a time for occupational activities. Timeframe: 4-6 weeks. 2. The patient will improve LE strength and endurance to facilitate walking distances of 3 mile(s) daily for household and community ambulation and general health and wellness activities. Timeframe: 6-8 weeks.   3. Patient will improve LE mobility, strength, and single-leg stabilization to meet strength requirements for reciprocal stair navigation pattern with no asymmetries for ascending and descending 3 flights of stairs daily for household and community ambulation. Timeframe: 6-8 weeks. Plan: Progress summary next session. HEP: Patient was issued a HEP handout 10/5/22 including S/L thoracic rotation, modified downward dog, and eccentric 90-90 lowering, to be added to HEP handout 9/28/22 including DL bridging, S/L clamshell, S/L reverse clamshell, dead bug nicole SB squeeze, S/L hip ABD, and supine wipers.      Charges: NMR x 1, MT x 1, Therex x 2   Total Timed Treatment: 55min    Total Treatment Time: 55min

## 2022-10-21 NOTE — PATIENT INSTRUCTIONS
Patient was issued a HEP handout from [email or web address]. Exercise selection, recommended resistance, and proper form/technique were reviewed with the patient. Patient verbalized understanding/comprehension of instruction and recommendations. Created by Jesús Grewal, PT, DPT, SCS, ATC, CSCS Oct 21st, 2022  View at [email or web address] using code: [facility]

## 2022-10-24 ENCOUNTER — PATIENT MESSAGE (OUTPATIENT)
Dept: ORTHOPEDICS CLINIC | Facility: CLINIC | Age: 57
End: 2022-10-24

## 2022-10-24 DIAGNOSIS — M25.559 HIP PAIN: Primary | ICD-10-CM

## 2022-10-24 DIAGNOSIS — M54.9 BACK PAIN, UNSPECIFIED BACK LOCATION, UNSPECIFIED BACK PAIN LATERALITY, UNSPECIFIED CHRONICITY: ICD-10-CM

## 2022-10-25 NOTE — TELEPHONE ENCOUNTER
Please see pt's mychart response to you. Has 2 questions and also requesting referral for hip flexor injection as you offered. Thank you!

## 2022-10-26 ENCOUNTER — OFFICE VISIT (OUTPATIENT)
Dept: PHYSICAL THERAPY | Age: 57
End: 2022-10-26
Attending: ANESTHESIOLOGY
Payer: COMMERCIAL

## 2022-10-26 PROCEDURE — 97110 THERAPEUTIC EXERCISES: CPT

## 2022-10-26 PROCEDURE — 97140 MANUAL THERAPY 1/> REGIONS: CPT

## 2022-10-26 PROCEDURE — 97112 NEUROMUSCULAR REEDUCATION: CPT

## 2022-10-26 NOTE — PROGRESS NOTES
Date of Service: 10/26/2022  Dx: Hip pain, right (M25.551), DDD (degenerative disc disease), lumbar (M51.36)  Insurance: [facility] CORE/NAVIGATE  Insurance Limits: N/A  Visit #: 9  Authorized # of Visits: N/A  POC/Auth Expiration: N/A  Date of Last PN: 9/16/22 (Visit #1)  Authorizing Physician/Provider: Valaria Nageotte Next MD visit: N/A  Fall Risk: Standard         Precautions: None            Subjective: \"It's weird, last night I had more glute pain. I spoke to the doctor and he's going to see if he can connect me with someone that does hip flexor shots. It's a speciality and he doesn't do that. He said if it relieves the pain, it's my hip, but my back still hurt. Last night, all of a sudden, I couldn't walk. Just walking even up the stairs in my house, I felt a shooting pain in the glutes but I still went to the gym. I didn't do the exercises, but otherwise, I'm really Sikh about doing them once a day. \"     Pain/Symptom Presentation:    Pain at rest: 3-4/10  Pain at worst: 10/10    Objective:   ROM:   Hip Motion PROM AROM    Right Left Right Left   Hip Flexion 120 130 N/A N/A   Hip Extension N/A N/A N/A N/A   Hip ABD N/A N/A N/A N/A   Hip ER (at 90deg hip flex) 50 50 N/A N/A   Hip IR (at 90deg hip flex) 20 20 N/A N/A   *indicates activity was associated with pain    Strength/MMT:  Hip Motion Strength    Right Left   Hip Flexion 4/5 4/5   Hip Extension 4-/5 4/5   Hip ABD 4-/5 4/5   Hip ER 4/5 4/5   Hip IR  4+/5 5/5   *indicates activity was associated with pain     Treatment:    Therapeutic Exercise: x 20min   Hip PROM - IR/ER x 10 (R)   Prone quad stretch: x 30\" (R)   Sidelying hip flexor stretch: x 30\" (R)   Manual hamstring stretch: x 30\" (B)   Prone hip extension (leg straight): 2 x 10 (B)   DL bridging march: 1 x 10  Elastic band lateral walk: 2 laps x 20ft, red theraband  S/L clamshell: 2 x 10 (B), red theraband   S/L reverse clamshell: x 20 (B)     Neuromuscular Re-education: x 10min  Trigger point release: (R) glute max, glute med, piriformis, hip flexor/iliospoas, ITB/VL  Dead bug nicole SB squeeze: 1 x 10 x 3\"   Pallof press: 1 x 10 (B), black power band    Manual Therapy: x 10min  Mulligan belt hip mobilizations - lateral: Grade 3, 4 x 10\" (R)   Mulligan belt hip mobilizations - inferior: Grade 3, 4 x 10\" (R)   Right hip long axis hip distraction with belt: Grade 3, 3 x 30\" (R)   Supine 90-90 posterior hip joint mobs: Grade 3, 4 x 10\" (R)   Lumbar PA accessory joint mobs - L1-L5: Grade 3, 4 x 10\" each     Provider Interactions/Education With Patient:   Verbal and manual cueing on proper performance of the prescribed exercises. Assessment: Phong Morales arrives today with some reports of more pain in the glute area. We did some additional TPR to the glute max, glute med, and piriformis muscle this date. We continue to progress core and hip strengthening exercises to facilitate improved tolerance to ADL's. Will do progress summary next session. Goals:   Short-Term Goals:  1. The patient will improve hip IR mobility to 25deg to facilitate improved tolerance to trunk rotation activities. Timeframe: 3-4 weeks. Long-Term Goals:  1. The patient will improve hip mobility to facilitate sitting for 3 hours at a time for occupational activities. Timeframe: 4-6 weeks. 2. The patient will improve LE strength and endurance to facilitate walking distances of 3 mile(s) daily for household and community ambulation and general health and wellness activities. Timeframe: 6-8 weeks. 3. Patient will improve LE mobility, strength, and single-leg stabilization to meet strength requirements for reciprocal stair navigation pattern with no asymmetries for ascending and descending 3 flights of stairs daily for household and community ambulation. Timeframe: 6-8 weeks. Plan: Progress summary next session.       HEP: Patient was issued a HEP handout 10/5/22 including S/L thoracic rotation, modified downward dog, and eccentric 90-90 lowering, to be added to HEP handout 9/28/22 including DL bridging, S/L clamshell, S/L reverse clamshell, dead bug nicole SB squeeze, S/L hip ABD, and supine wipers.      Charges: NMR x 1, MT x 1, Therex x 1  Total Timed Treatment: 40min    Total Treatment Time: 40min

## 2022-10-27 NOTE — TELEPHONE ENCOUNTER
MD Stephanie Maria DO 24 minutes ago (10:16 AM)     Adán Palafox,     I saw this patient in clinic for hip and back pain - she does have hip arthritis and I performed an intraarticular hip injection (she had a wedding to go to that weekend and was hoping for quick relief) which did help quite a bit, but she still reports pain in her back, buttock, and what she feels is some hip flexor pain. She asked for a referral to a back specialist and for a hip flexor injection - I believe you (or your partners) perform iliopsoas injection (if indicated - I did advise her that the referral would be for evaluation first as to whether a hip flexor injection would be warranted) but just wanted to double check (I haven't referred anyone for this recently). I did tell her that Physiatry would be a great referral for her back and buttock pain (she has had epidural injections in the past that have helped)     Thanks   Paolo Hodgson MD  [facility] Clinical Pool 27 minutes ago (10:13 AM)     Can you please put in referral for Physiatry for back and hip pain?

## 2022-10-28 ENCOUNTER — OFFICE VISIT (OUTPATIENT)
Dept: PHYSICAL THERAPY | Age: 57
End: 2022-10-28
Attending: ANESTHESIOLOGY
Payer: COMMERCIAL

## 2022-10-28 PROCEDURE — 97112 NEUROMUSCULAR REEDUCATION: CPT

## 2022-10-28 PROCEDURE — 97110 THERAPEUTIC EXERCISES: CPT

## 2022-10-28 PROCEDURE — 97140 MANUAL THERAPY 1/> REGIONS: CPT

## 2022-10-28 RX ORDER — ATORVASTATIN CALCIUM 10 MG/1
10 TABLET, FILM COATED ORAL DAILY
Qty: 30 TABLET | Refills: 0 | Status: SHIPPED | OUTPATIENT
Start: 2022-10-28 | End: 2023-10-23

## 2022-10-28 NOTE — PATIENT INSTRUCTIONS
Patient was issued a HEP handout from [email or web address]. Exercise selection, recommended resistance, and proper form/technique were reviewed with the patient. Patient verbalized understanding/comprehension of instruction and recommendations. View at [email or web address] using code: Liban Sevilla

## 2022-10-28 NOTE — TELEPHONE ENCOUNTER
Atorvastatin refilled #30 only ; call placed to patient to advise PT she needs to complete labs prior to receiving further refills. Patient acknowledged understanding and stated she is scheduled to do her labs on Friday 11/04/2022.

## 2022-10-28 NOTE — PROGRESS NOTES
PHYSICAL THERAPY RE-EVALUATION:     Date of Service: 10/28/2022  Dx: Hip pain, right (M25.551), DDD (degenerative disc disease), lumbar (M51.36)  Insurance: [facility] CORE/NAVIGATE  Insurance Limits: N/A  Visit #: 10  Authorized # of Visits: N/A  POC/Auth Expiration: N/A  Date of Last PN: 9/16/22 (Visit #1)  Authorizing Physician/Provider: Nanci Alcaraz MD visit: N/A  Fall Risk: Standard         Precautions: None            Subjective: \"Today I'm feeling pretty good. I made an appt to go meet with the physiatrist for the hip flexor injection. When you were doing my range of motion, I feel like that is limiting me. The hip specialist also said I could go up in the dosage of the Celebrex but I wonder if I need to do it regularly, maybe just here and there. \"     \"I'm not in the pain I was in a long time ago. \"     Pain/Symptom Presentation:    Pain at rest: 3-4/10  Pain at worst: 10/10    Objective: Activity Measures:  Sitting: Mild Activity Limitation: Patient is able to sit up to 2 hour before disruption due to hip pain. Bending Forward: Mild Activity Limitation: Patient is with a stretch in glutes with forward bending. Standing/Walking After Prolonged Sitting: Mild Activity Limitation: Patient reports stiffness in her hip upon standing/walking after prolonged sitting that subsides within 5 minutes. Standing: Moderate Activity Limitation: Patient is able to stand up to 1 hour before disruption due to symptoms. Walking: Mild Activity Limitation: Patient is able to walk 2 miles daily.    Ascending stairs: Mild Activity Limitation: Patient is able to ascend stairs with reciprocal gait pattern, mild deviations   Descending stairs: Mild Activity Limitation: Patient is able to ascend stairs with reciprocal gait pattern, mild deviations     ROM:   Hip Motion PROM AROM    Right Left Right Left   Hip Flexion 120 130 N/A N/A   Hip Extension N/A N/A N/A N/A   Hip ABD N/A N/A N/A N/A   Hip ER (at 90deg hip flex) 55 60 N/A N/A Hip IR (at 90deg hip flex) 15 23 N/A N/A   *indicates activity was associated with pain    Strength/MMT:  Hip Motion Strength    Right Left   Hip Flexion 4/5 4+/5   Hip Extension 4-/5 4/5   Hip ABD 4-/5 4/5   Hip ER 4/5 4/5   Hip IR  5/5 5/5   *indicates activity was associated with pain     Treatment:    Therapeutic Exercise: x 20min   Hip PROM - IR/ER x 10 (R)   Prone quad stretch: x 30\" (R)   Sidelying hip flexor stretch: x 30\" (R)   Manual hamstring stretch: x 30\" (B)   Piriformis stretch: x 30\" (B)   Prone hip extension (leg straight): 2 x 10 (B)   DL bridging march: 1 x 10  Elastic band lateral walk: 2 laps x 20ft, red theraband    Neuromuscular Re-education: x 10min  Trigger point release: (R) glute max, glute med, piriformis, hip flexor/iliospoas, ITB/VL  Dead bug nicole SB squeeze: 1 x 10 x 3\"   Pallof press: 1 x 10 (B), black power band    Manual Therapy: x 15min  Mulligan belt hip mobilizations - lateral: Grade 3, 3 x 10\" (R)   Mulligan belt hip mobilizations - inferior: Grade 3, 3 x 10\" (R)   Right hip long axis hip distraction with belt: Grade 3, 3 x 30\" (R)   Supine 90-90 posterior hip joint mobs: Grade 3, 3 x 10\" (R)   Lumbar PA accessory joint mobs - L1-L5: Grade 3, 4 x 10\" each     Provider Interactions/Education With Patient:   Verbal and manual cueing on proper performance of the prescribed exercises. Assessment: Anita Rutledge is a 55year old-year-old female that presented to physical therapy evaluation with complaints of right hip and low back pain. The patient has been seen for 10 sessions in physical therapy including hip and lumbar spine joint mobilizations, hip flexibility, and LE strengthening. The patient has seen an orthopedic, a pain management specialist, and is scheduled to see a physiatrist in the near future. The patient is hoping to prolong the need for a hip replacement surgery.  She has seen a decrease in her walking speed and strength on her involved side, but is with limited availability to take time off of work. For the post-operative care. The patient is with improved tolerance to sitting and reports less stiffness upon standing/walking after prolonged sitting. She is still with deficits in hip mobility and core and LE strength that we would like to continue to address in therapy. The patient would benefit from continued physical therapy to facilitate further improvements in hip mobility, hip flexibility, and core and LE strength for improved tolerance to ADL's and workout activities. Goals:   Short-Term Goals:  1. The patient will improve hip IR mobility to 25deg to facilitate improved tolerance to trunk rotation activities. Timeframe: 6-8 weeks. (IN PROGRESS 10/28/22. Patient shows improved mobility on her left side, but is with remaining limitations on her right side. Updated goal timeframe.)   Long-Term Goals:  1. The patient will improve hip mobility to facilitate sitting for 3 hours at a time for occupational activities. Timeframe: 8 weeks. (IN PROGRESS 10/28/22. Patient is able to sit 2 hours at a time. Updated goal timeframe.)   2. The patient will improve LE strength and endurance to facilitate walking distances of 3 mile(s) daily for household and community ambulation and general health and wellness activities. Timeframe: 10 weeks. (IN PROGRESS 10/28/22. Patient is able to walk 2 miles at a time on the treadmill. Updated goal timeframe.)   3. Patient will improve LE mobility, strength, and single-leg stabilization to meet strength requirements for reciprocal stair navigation pattern with no asymmetries for ascending and descending 3 flights of stairs daily for household and community ambulation. Timeframe: 10 weeks. (IN PROGRESS 10/28/22. Patient still with some strength deficits causing remaining deviations with navigating stairs. Updated goal timeframe.)     Plan: Patient will be seen for 2x/week for an additional 4 weeks, for an additional 8 visits.  We will re-evaluate the patient at that time in order to determine functional progress, evaluate short-term goal completion, and establish an updated plan of care. Possible treatment interventions will/may include: Therapeutic Activities, Therapeutic Exercise, Neuromuscular Re-education, Manual Therapy, Home Exercise Program Instruction, Patient Education, Self-Care/Home Management, and Modalities as needed. HEP: Patient was issued a HEP handout 10/28/22 including S/L open book, modified downward dog, DL bridging march, dead bug SB nicole squeeze, elastic band lateral walk, and pallof press.      Charges: NMR x 1, MT x 1, Therex x 1  Total Timed Treatment: 45min    Total Treatment Time: 45min

## 2022-11-02 ENCOUNTER — OFFICE VISIT (OUTPATIENT)
Dept: PHYSICAL THERAPY | Age: 57
End: 2022-11-02
Attending: ANESTHESIOLOGY
Payer: COMMERCIAL

## 2022-11-02 PROCEDURE — 97140 MANUAL THERAPY 1/> REGIONS: CPT

## 2022-11-02 PROCEDURE — 97112 NEUROMUSCULAR REEDUCATION: CPT

## 2022-11-02 PROCEDURE — 97110 THERAPEUTIC EXERCISES: CPT

## 2022-11-02 NOTE — PROGRESS NOTES
Date of Service: 11/2/2022  Dx: Hip pain, right (M25.551), DDD (degenerative disc disease), lumbar (M51.36)  Insurance: [facility] Limits: N/A  Visit #: 11  Authorized # of Visits: N/A  POC/Auth Expiration: N/A  Date of Last PN: 10/28/22 (Visit #10)  Authorizing Physician/Provider: Tanya Alcaraz MD visit: N/A  Fall Risk: Standard         Precautions: None            Subjective: \"It's just that pain in my glute comes and goes. I don't know if it's my hip or my back. I don't even workout that hard. My walking is still better, so I'm not going to complain. I'm excited for next Thursday for the hip specialist. I hope he'll do the hip injection right there and I may talk to him about a back injection. Pain/Symptom Presentation:    Pain at rest: 3-4/10  Pain at worst: 10/10    Objective: Activity Measures:  Sitting: Mild Activity Limitation: Patient is able to sit up to 2 hour before disruption due to hip pain. Bending Forward: Mild Activity Limitation: Patient is with a stretch in glutes with forward bending. Standing/Walking After Prolonged Sitting: Mild Activity Limitation: Patient reports stiffness in her hip upon standing/walking after prolonged sitting that subsides within 5 minutes. Standing: Moderate Activity Limitation: Patient is able to stand up to 1 hour before disruption due to symptoms. Walking: Mild Activity Limitation: Patient is able to walk 2 miles daily.    Ascending stairs: Mild Activity Limitation: Patient is able to ascend stairs with reciprocal gait pattern, mild deviations   Descending stairs: Mild Activity Limitation: Patient is able to ascend stairs with reciprocal gait pattern, mild deviations     ROM:   Hip Motion PROM AROM    Right Left Right Left   Hip Flexion 120 130 N/A N/A   Hip Extension N/A N/A N/A N/A   Hip ABD N/A N/A N/A N/A   Hip ER (at 90deg hip flex) 55 60 N/A N/A   Hip IR (at 90deg hip flex) 15 23 N/A N/A   *indicates activity was associated with pain    Strength/MMT:  Hip Motion Strength    Right Left   Hip Flexion 4/5 4+/5   Hip Extension 4-/5 4/5   Hip ABD 4-/5 4/5   Hip ER 4/5 4/5   Hip IR  5/5 5/5   *indicates activity was associated with pain     Treatment:    Therapeutic Exercise: x 20min   Hip PROM - IR/ER x 10 (R)   Supine hip flexor stretch off EOB: x 30\" (R)   Manual hamstring stretch: x 30\" (B)   Piriformis stretch: x 30\" (B)   DL bridging march: 1 x 10  Elastic band lateral walk: 2 laps x 20ft, red theraband  Elastic band PL reach: 2 x 10 (B), red theraband     Neuromuscular Re-education: x 10min  Trigger point release: (R) glute max, glute med, piriformis, hip flexor/iliospoas, ITB/VL  Dead bug : 2 x 5 (B)   Pallof press: 2 x 10 (B), black power band    Manual Therapy: x 15min  Mulligan belt hip mobilizations - lateral: Grade 3, 3 x 10\" (R)   Mulligan belt hip mobilizations - inferior: Grade 3, 3 x 10\" (R)   Right hip long axis hip distraction with belt: Grade 3, 3 x 30\" (R)   Supine 90-90 posterior hip joint mobs: Grade 3, 3 x 10\" (R)   Lumbar PA accessory joint mobs - L1-L5: Grade 3, 4 x 10\" each     Provider Interactions/Education With Patient:   Verbal and manual cueing on proper performance of the prescribed exercises. Assessment: We continue to work on hip mobility and flexibility with Fozia. She is better than she was two months ago, but she is still concerned about her remaining strength deficits. She is increasing her walking speed on the treadmilll. Fozia's exercises were progressed to include additional glute strengthening exercises. She was also given a red band for home for the lateral walk exercise. We continue to work on core and hip strengthening as tolerated. Goals:   Short-Term Goals:  1. The patient will improve hip IR mobility to 25deg to facilitate improved tolerance to trunk rotation activities. Timeframe: 6-8 weeks. (IN PROGRESS 10/28/22.  Patient shows improved mobility on her left side, but is with remaining limitations on her right side. Updated goal timeframe.)   Long-Term Goals:  1. The patient will improve hip mobility to facilitate sitting for 3 hours at a time for occupational activities. Timeframe: 8 weeks. (IN PROGRESS 10/28/22. Patient is able to sit 2 hours at a time. Updated goal timeframe.)   2. The patient will improve LE strength and endurance to facilitate walking distances of 3 mile(s) daily for household and community ambulation and general health and wellness activities. Timeframe: 10 weeks. (IN PROGRESS 10/28/22. Patient is able to walk 2 miles at a time on the treadmill. Updated goal timeframe.)   3. Patient will improve LE mobility, strength, and single-leg stabilization to meet strength requirements for reciprocal stair navigation pattern with no asymmetries for ascending and descending 3 flights of stairs daily for household and community ambulation. Timeframe: 10 weeks. (IN PROGRESS 10/28/22. Patient still with some strength deficits causing remaining deviations with navigating stairs. Updated goal timeframe.)     Plan: Continue to progress core and hip strengthening as tolerated. HEP: Patient was issued a HEP handout 10/28/22 including S/L open book, modified downward dog, DL bridging march, dead bug SB nicole squeeze, elastic band lateral walk, and pallof press.      Charges: NMR x 1, MT x 1, Therex x 1  Total Timed Treatment: 45min    Total Treatment Time: 45min

## 2022-11-04 ENCOUNTER — LAB ENCOUNTER (OUTPATIENT)
Dept: LAB | Age: 57
End: 2022-11-04
Attending: NURSE PRACTITIONER
Payer: COMMERCIAL

## 2022-11-04 ENCOUNTER — OFFICE VISIT (OUTPATIENT)
Dept: PHYSICAL THERAPY | Age: 57
End: 2022-11-04
Attending: ANESTHESIOLOGY
Payer: COMMERCIAL

## 2022-11-04 ENCOUNTER — PATIENT MESSAGE (OUTPATIENT)
Dept: INTERNAL MEDICINE CLINIC | Facility: CLINIC | Age: 57
End: 2022-11-04

## 2022-11-04 DIAGNOSIS — E78.5 DYSLIPIDEMIA: Primary | ICD-10-CM

## 2022-11-04 DIAGNOSIS — Z13.220 SCREENING FOR LIPID DISORDERS: ICD-10-CM

## 2022-11-04 DIAGNOSIS — Z00.00 ROUTINE GENERAL MEDICAL EXAMINATION AT A HEALTH CARE FACILITY: ICD-10-CM

## 2022-11-04 DIAGNOSIS — Z13.0 SCREENING FOR DISORDER OF BLOOD AND BLOOD-FORMING ORGANS: ICD-10-CM

## 2022-11-04 DIAGNOSIS — E78.5 DYSLIPIDEMIA: ICD-10-CM

## 2022-11-04 DIAGNOSIS — Z13.29 SCREENING FOR THYROID DISORDER: ICD-10-CM

## 2022-11-04 DIAGNOSIS — Z13.228 SCREENING FOR METABOLIC DISORDER: ICD-10-CM

## 2022-11-04 LAB
ALBUMIN SERPL-MCNC: 3.7 G/DL (ref 3.4–5)
ALBUMIN/GLOB SERPL: 1.5 {RATIO} (ref 1–2)
ALP LIVER SERPL-CCNC: 62 U/L
ALT SERPL-CCNC: 29 U/L
ANION GAP SERPL CALC-SCNC: 5 MMOL/L (ref 0–18)
AST SERPL-CCNC: 14 U/L (ref 15–37)
BASOPHILS # BLD AUTO: 0.1 X10(3) UL (ref 0–0.2)
BASOPHILS NFR BLD AUTO: 1.3 %
BILIRUB SERPL-MCNC: 0.6 MG/DL (ref 0.1–2)
BUN BLD-MCNC: 13 MG/DL (ref 7–18)
CALCIUM BLD-MCNC: 9.1 MG/DL (ref 8.5–10.1)
CHLORIDE SERPL-SCNC: 107 MMOL/L (ref 98–112)
CHOLEST SERPL-MCNC: 181 MG/DL (ref ?–200)
CO2 SERPL-SCNC: 28 MMOL/L (ref 21–32)
CREAT BLD-MCNC: 0.76 MG/DL
EOSINOPHIL # BLD AUTO: 0.15 X10(3) UL (ref 0–0.7)
EOSINOPHIL NFR BLD AUTO: 1.9 %
ERYTHROCYTE [DISTWIDTH] IN BLOOD BY AUTOMATED COUNT: 14.9 %
FASTING PATIENT LIPID ANSWER: YES
FASTING STATUS PATIENT QL REPORTED: YES
GFR SERPLBLD BASED ON 1.73 SQ M-ARVRAT: 92 ML/MIN/1.73M2 (ref 60–?)
GLOBULIN PLAS-MCNC: 2.4 G/DL (ref 2.8–4.4)
GLUCOSE BLD-MCNC: 85 MG/DL (ref 70–99)
HCT VFR BLD AUTO: 48 %
HDLC SERPL-MCNC: 65 MG/DL (ref 40–59)
HGB BLD-MCNC: 15.6 G/DL
IMM GRANULOCYTES # BLD AUTO: 0.04 X10(3) UL (ref 0–1)
IMM GRANULOCYTES NFR BLD: 0.5 %
LDLC SERPL CALC-MCNC: 92 MG/DL (ref ?–100)
LYMPHOCYTES # BLD AUTO: 1.47 X10(3) UL (ref 1–4)
LYMPHOCYTES NFR BLD AUTO: 18.8 %
MCH RBC QN AUTO: 28.9 PG (ref 26–34)
MCHC RBC AUTO-ENTMCNC: 32.5 G/DL (ref 31–37)
MCV RBC AUTO: 89.1 FL
MONOCYTES # BLD AUTO: 0.59 X10(3) UL (ref 0.1–1)
MONOCYTES NFR BLD AUTO: 7.5 %
NEUTROPHILS # BLD AUTO: 5.48 X10 (3) UL (ref 1.5–7.7)
NEUTROPHILS # BLD AUTO: 5.48 X10(3) UL (ref 1.5–7.7)
NEUTROPHILS NFR BLD AUTO: 70 %
NONHDLC SERPL-MCNC: 116 MG/DL (ref ?–130)
OSMOLALITY SERPL CALC.SUM OF ELEC: 289 MOSM/KG (ref 275–295)
PLATELET # BLD AUTO: 219 10(3)UL (ref 150–450)
POTASSIUM SERPL-SCNC: 4.2 MMOL/L (ref 3.5–5.1)
PROT SERPL-MCNC: 6.1 G/DL (ref 6.4–8.2)
RBC # BLD AUTO: 5.39 X10(6)UL
SODIUM SERPL-SCNC: 140 MMOL/L (ref 136–145)
TRIGL SERPL-MCNC: 136 MG/DL (ref 30–149)
TSI SER-ACNC: 0.8 MIU/ML (ref 0.36–3.74)
VLDLC SERPL CALC-MCNC: 22 MG/DL (ref 0–30)
WBC # BLD AUTO: 7.8 X10(3) UL (ref 4–11)

## 2022-11-04 PROCEDURE — 84443 ASSAY THYROID STIM HORMONE: CPT

## 2022-11-04 PROCEDURE — 80053 COMPREHEN METABOLIC PANEL: CPT

## 2022-11-04 PROCEDURE — 97140 MANUAL THERAPY 1/> REGIONS: CPT

## 2022-11-04 PROCEDURE — 97112 NEUROMUSCULAR REEDUCATION: CPT

## 2022-11-04 PROCEDURE — 36415 COLL VENOUS BLD VENIPUNCTURE: CPT

## 2022-11-04 PROCEDURE — 85025 COMPLETE CBC W/AUTO DIFF WBC: CPT

## 2022-11-04 PROCEDURE — 80061 LIPID PANEL: CPT

## 2022-11-04 PROCEDURE — 97110 THERAPEUTIC EXERCISES: CPT

## 2022-11-04 NOTE — PROGRESS NOTES
Date of Service: 11/4/2022  Dx: Hip pain, right (M25.551), DDD (degenerative disc disease), lumbar (M51.36)  Insurance: [facility] Limits: N/A  Visit #: 12  Authorized # of Visits: N/A  POC/Auth Expiration: N/A  Date of Last PN: 10/28/22 (Visit #10)  Authorizing Physician/Provider: Breann Alcaraz MD visit: N/A  Fall Risk: Standard         Precautions: None            Subjective: \"I'm mediocre, which is pretty good. I keep rubbing my glute. I am really excited for Thursday's appt with the doctor. \"     Pain/Symptom Presentation:    Pain at rest: 3-4/10  Pain at worst: 10/10    Objective:     ROM:   Hip Motion PROM AROM    Right Left Right Left   Hip Flexion 120 130 N/A N/A   Hip Extension N/A N/A N/A N/A   Hip ABD N/A N/A N/A N/A   Hip ER (at 90deg hip flex) 55 60 N/A N/A   Hip IR (at 90deg hip flex) 15 23 N/A N/A   *indicates activity was associated with pain    Strength/MMT:  Hip Motion Strength    Right Left   Hip Flexion 4/5 4+/5   Hip Extension 4-/5 4/5   Hip ABD 4-/5 4/5   Hip ER 4/5 4/5   Hip IR  5/5 5/5   *indicates activity was associated with pain     Treatment:    Therapeutic Exercise: x 20min   Hip PROM - IR/ER x 10 (R)   Supine hip flexor stretch off EOB: x 30\" (R)   Manual hamstring stretch: x 30\" (B)   Piriformis stretch: x 30\" (B) - better stretch than last session   DL bridging march: 1 x 10  Elastic band lateral walk: 2 laps x 20ft, red theraband  Elastic band PL reach: 2 x 10 (B), red theraband     Neuromuscular Re-education: x 10min  Trigger point release: (R) glute max, glute med, piriformis, hip flexor/iliospoas, ITB/VL  Dead bug : 2 x 5 (B)   Pallof press: 2 x 10 (B), black power band    Manual Therapy: x 15min  Mulligan belt hip mobilizations - lateral: Grade 3, 3 x 10\" (R)   Mulligan belt hip mobilizations - inferior: Grade 3, 3 x 10\" (R)   Right hip long axis hip distraction with belt: Grade 3, 3 x 30\" (R)   Supine 90-90 posterior hip joint mobs: Grade 3, 3 x 10\" (R)   Lumbar PA accessory joint mobs - L1-L5: Grade 3, 4 x 10\" each     Provider Interactions/Education With Patient:   Verbal and manual cueing on proper performance of the prescribed exercises. Assessment: Fozia's clicking upon release of hip traction with belt was audible today, though it did feel like we were able to achieve and better stretch to the joint today. The patient remains compliant with her HEP and remains motivated to improve. She is to see a hip specialist next week and is still hoping for an injection to her hip flexor. She is still TTP over the glute musculature, which we continue to address in therapy. Goals:   Short-Term Goals:  1. The patient will improve hip IR mobility to 25deg to facilitate improved tolerance to trunk rotation activities. Timeframe: 6-8 weeks. (IN PROGRESS 10/28/22. Patient shows improved mobility on her left side, but is with remaining limitations on her right side. Updated goal timeframe.)   Long-Term Goals:  1. The patient will improve hip mobility to facilitate sitting for 3 hours at a time for occupational activities. Timeframe: 8 weeks. (IN PROGRESS 10/28/22. Patient is able to sit 2 hours at a time. Updated goal timeframe.)   2. The patient will improve LE strength and endurance to facilitate walking distances of 3 mile(s) daily for household and community ambulation and general health and wellness activities. Timeframe: 10 weeks. (IN PROGRESS 10/28/22. Patient is able to walk 2 miles at a time on the treadmill. Updated goal timeframe.)   3. Patient will improve LE mobility, strength, and single-leg stabilization to meet strength requirements for reciprocal stair navigation pattern with no asymmetries for ascending and descending 3 flights of stairs daily for household and community ambulation. Timeframe: 10 weeks. (IN PROGRESS 10/28/22. Patient still with some strength deficits causing remaining deviations with navigating stairs.  Updated goal timeframe.)     Plan: Continue to progress core and hip strengthening as tolerated. HEP: Patient was issued a HEP handout 10/28/22 including S/L open book, modified downward dog, DL bridging march, dead bug SB nicole squeeze, elastic band lateral walk, and pallof press.      Charges: NMR x 1, MT x 1, Therex x 1  Total Timed Treatment: 45min    Total Treatment Time: 45min

## 2022-11-07 RX ORDER — ATORVASTATIN CALCIUM 10 MG/1
10 TABLET, FILM COATED ORAL DAILY
Qty: 90 TABLET | Refills: 0 | Status: SHIPPED | OUTPATIENT
Start: 2022-11-07 | End: 2023-02-05

## 2022-11-07 NOTE — TELEPHONE ENCOUNTER
From: Servando Edward  To: URBANO Del Rio  Sent: 11/4/2022 5:03 PM CDT  Subject: Medication    Hello,    [facility] never filled my 30 day Cholesterol script. Now that I completed my blood work up, can you please call in a 90 day supply. Unless Fany Chamberlain wants to change by dose for some reason.     Thank you,  Rohini Galloway

## 2022-11-09 ENCOUNTER — OFFICE VISIT (OUTPATIENT)
Dept: INTERNAL MEDICINE CLINIC | Facility: CLINIC | Age: 57
End: 2022-11-09
Payer: COMMERCIAL

## 2022-11-09 ENCOUNTER — OFFICE VISIT (OUTPATIENT)
Dept: PHYSICAL THERAPY | Age: 57
End: 2022-11-09
Attending: ANESTHESIOLOGY
Payer: COMMERCIAL

## 2022-11-09 VITALS
DIASTOLIC BLOOD PRESSURE: 70 MMHG | WEIGHT: 157 LBS | TEMPERATURE: 99 F | OXYGEN SATURATION: 98 % | HEIGHT: 64.17 IN | HEART RATE: 72 BPM | BODY MASS INDEX: 26.8 KG/M2 | SYSTOLIC BLOOD PRESSURE: 110 MMHG

## 2022-11-09 DIAGNOSIS — Z00.00 ROUTINE GENERAL MEDICAL EXAMINATION AT A HEALTH CARE FACILITY: Primary | ICD-10-CM

## 2022-11-09 DIAGNOSIS — E78.5 DYSLIPIDEMIA: ICD-10-CM

## 2022-11-09 DIAGNOSIS — Z12.31 ENCOUNTER FOR SCREENING MAMMOGRAM FOR MALIGNANT NEOPLASM OF BREAST: ICD-10-CM

## 2022-11-09 PROCEDURE — 3078F DIAST BP <80 MM HG: CPT | Performed by: NURSE PRACTITIONER

## 2022-11-09 PROCEDURE — 3074F SYST BP LT 130 MM HG: CPT | Performed by: NURSE PRACTITIONER

## 2022-11-09 PROCEDURE — 97112 NEUROMUSCULAR REEDUCATION: CPT

## 2022-11-09 PROCEDURE — 99396 PREV VISIT EST AGE 40-64: CPT | Performed by: NURSE PRACTITIONER

## 2022-11-09 PROCEDURE — 97140 MANUAL THERAPY 1/> REGIONS: CPT

## 2022-11-09 PROCEDURE — 3008F BODY MASS INDEX DOCD: CPT | Performed by: NURSE PRACTITIONER

## 2022-11-09 PROCEDURE — 97110 THERAPEUTIC EXERCISES: CPT

## 2022-11-09 NOTE — PROGRESS NOTES
Date of Service: 11/9/2022  Dx: Hip pain, right (M25.551), DDD (degenerative disc disease), lumbar (M51.36)  Insurance: [facility] CORE/NAVIGATE  Insurance Limits: N/A  Visit #: 13  Authorized # of Visits: N/A  POC/Auth Expiration: N/A  Date of Last PN: 10/28/22 (Visit #10)  Authorizing Physician/Provider: Judye Duverney Next MD visit: N/A  Fall Risk: Standard         Precautions: None            Subjective: \"I went to the gym every day this week. I went for my physical and I'm am in great health. \" The patient reports that she was recommended a bone density test.     Pain/Symptom Presentation:    Pain at rest: 3-4/10  Pain at worst: 10/10    Objective:     ROM:   Hip Motion PROM AROM    Right Left Right Left   Hip Flexion 120 130 N/A N/A   Hip Extension N/A N/A N/A N/A   Hip ABD N/A N/A N/A N/A   Hip ER (at 90deg hip flex) 55 60 N/A N/A   Hip IR (at 90deg hip flex) 15 23 N/A N/A   *indicates activity was associated with pain    Strength/MMT:  Hip Motion Strength    Right Left   Hip Flexion 4/5 4+/5   Hip Extension 4-/5 4/5   Hip ABD 4-/5 4/5   Hip ER 4/5 4/5   Hip IR  5/5 5/5   *indicates activity was associated with pain     Treatment:    Therapeutic Exercise: x 20min   Hip PROM - IR/ER x 10 (R)   Supine hip flexor stretch off EOB: x 30\" (R)   Manual hamstring stretch: x 30\" (B)   Piriformis stretch: x 30\" (B) - better stretch than last session   DL bridging march: 1 x 10  Elastic band lateral walk: 2 laps x 20ft, red theraband  Elastic band PL reach: 2 x 10 (B), red theraband     Neuromuscular Re-education: x 10min  Trigger point release: (R) glute max, glute med, piriformis, hip flexor/iliospoas, ITB/VL  Dead bug : 2 x 5 (B)   Pallof press: 2 x 10 (B), black power band  Lateral band chop: 1 x 10 (B), black power band    Manual Therapy: x 15min  Mulligan belt hip mobilizations - lateral: Grade 3, 3 x 10\" (R)   Mulligan belt hip mobilizations - inferior: Grade 3, 3 x 10\" (R)   Right hip long axis hip distraction with belt: Grade 3, 3 x 30\" (R)   Supine 90-90 posterior hip joint mobs: Grade 3, 3 x 10\" (R)   Lumbar PA accessory joint mobs - L1-L5: Grade 3, 4 x 10\" each     Provider Interactions/Education With Patient:   Verbal and manual cueing on proper performance of the prescribed exercises. Assessment: Carli Simmons has with remaining pain upon release of belt traction to her hip today. She is hoping for an injection to her hip flexor at her MD appt tomorrow. The patient is noticing small improvements in her strength when working out at the gym. The patient would benefit from continued therapy for further progression in hip and lumbar spine mobility and hip and core strengthening for improved tolerance to ADL's. Goals:   Short-Term Goals:  1. The patient will improve hip IR mobility to 25deg to facilitate improved tolerance to trunk rotation activities. Timeframe: 6-8 weeks. (IN PROGRESS 10/28/22. Patient shows improved mobility on her left side, but is with remaining limitations on her right side. Updated goal timeframe.)   Long-Term Goals:  1. The patient will improve hip mobility to facilitate sitting for 3 hours at a time for occupational activities. Timeframe: 8 weeks. (IN PROGRESS 10/28/22. Patient is able to sit 2 hours at a time. Updated goal timeframe.)   2. The patient will improve LE strength and endurance to facilitate walking distances of 3 mile(s) daily for household and community ambulation and general health and wellness activities. Timeframe: 10 weeks. (IN PROGRESS 10/28/22. Patient is able to walk 2 miles at a time on the treadmill. Updated goal timeframe.)   3. Patient will improve LE mobility, strength, and single-leg stabilization to meet strength requirements for reciprocal stair navigation pattern with no asymmetries for ascending and descending 3 flights of stairs daily for household and community ambulation. Timeframe: 10 weeks. (IN PROGRESS 10/28/22.  Patient still with some strength deficits causing remaining deviations with navigating stairs. Updated goal timeframe.)     Plan: Continue to progress core and hip strengthening as tolerated. HEP: Patient was issued a HEP handout 10/28/22 including S/L open book, modified downward dog, DL bridging march, dead bug SB nicole squeeze, elastic band lateral walk, and pallof press.      Charges: NMR x 1, MT x 1, Therex x 1  Total Timed Treatment: 45min    Total Treatment Time: 45min

## 2022-11-09 NOTE — PROGRESS NOTES
Patient offered Influenza Vaccine ; patient declined at this time.        MAMMOGRAM - Due Order Pended

## 2022-11-10 ENCOUNTER — TELEPHONE (OUTPATIENT)
Dept: NEUROLOGY | Facility: CLINIC | Age: 57
End: 2022-11-10

## 2022-11-10 ENCOUNTER — PATIENT MESSAGE (OUTPATIENT)
Dept: PAIN CLINIC | Facility: CLINIC | Age: 57
End: 2022-11-10

## 2022-11-10 ENCOUNTER — OFFICE VISIT (OUTPATIENT)
Dept: PHYSICAL MEDICINE AND REHAB | Facility: CLINIC | Age: 57
End: 2022-11-10
Payer: COMMERCIAL

## 2022-11-10 ENCOUNTER — TELEPHONE (OUTPATIENT)
Dept: PAIN CLINIC | Facility: CLINIC | Age: 57
End: 2022-11-10

## 2022-11-10 VITALS
BODY MASS INDEX: 26.7 KG/M2 | OXYGEN SATURATION: 98 % | DIASTOLIC BLOOD PRESSURE: 84 MMHG | WEIGHT: 156.38 LBS | SYSTOLIC BLOOD PRESSURE: 120 MMHG | HEART RATE: 74 BPM | HEIGHT: 64.17 IN

## 2022-11-10 DIAGNOSIS — M16.11 PRIMARY OSTEOARTHRITIS OF RIGHT HIP: ICD-10-CM

## 2022-11-10 DIAGNOSIS — M70.71 ILIOPSOAS BURSITIS OF RIGHT HIP: Primary | ICD-10-CM

## 2022-11-10 DIAGNOSIS — M51.36 DDD (DEGENERATIVE DISC DISEASE), LUMBAR: Primary | ICD-10-CM

## 2022-11-10 RX ORDER — TRIAMCINOLONE ACETONIDE 40 MG/ML
40 INJECTION, SUSPENSION INTRA-ARTICULAR; INTRAMUSCULAR ONCE
Status: COMPLETED | OUTPATIENT
Start: 2022-11-10 | End: 2022-11-10

## 2022-11-10 RX ORDER — CYCLOBENZAPRINE HCL 10 MG
10 TABLET ORAL 2 TIMES DAILY PRN
Qty: 30 TABLET | Refills: 0 | Status: SHIPPED | OUTPATIENT
Start: 2022-11-10

## 2022-11-10 RX ORDER — LIDOCAINE HYDROCHLORIDE 10 MG/ML
4 INJECTION, SOLUTION INFILTRATION; PERINEURAL ONCE
Status: COMPLETED | OUTPATIENT
Start: 2022-11-10 | End: 2022-11-10

## 2022-11-10 NOTE — TELEPHONE ENCOUNTER
Patient would like to proceed with another injection, please contact patient and advise if office visit is needed or if injection can be ordered.

## 2022-11-10 NOTE — TELEPHONE ENCOUNTER
From: Ashley Pride  To: Marie Sanchez MD  Sent: 11/10/2022 12:04 PM CST  Subject: Spinal Epidural    Hi Dr. Jaycob Adams,  I wanted to let you know that today I had a hip flexor injection and the doctor I saw said that I would be able to get an injection within two weeks in my back if needed. I called your office today so that I could schedule another spinal epidural 2 weeks from today. They need your approval. You and I spoke about this and we decided after a few weeks of PT I could get another spinal injection. I am making progress with PT, but believe another injection will hopefully help my progress. Please let me know your thoughts.     Thank you,  Marisa Yu

## 2022-11-10 NOTE — TELEPHONE ENCOUNTER
Contacted pt at this time. Pt states that she would like to proceed with another injection. Pt states that she did everything that  recommended,she did the pT and saw ortho and she's makng progress. Pt states that per  as long as she's making progress he can do another injection for her. Advise pt that we are just waiting for 's feedback if he can place referral without office visit. Pt states that if she can have it done as soon as possible,advised pt that we will contact her as soon as we have response with . Pt Vu

## 2022-11-10 NOTE — PROCEDURES
Dx: right iliopsoas bursitis  Procedure: right iliopsoas bursa steroid injection under US guidance    Informed consent obtained and verified. Scanning proximally to distally using a curivlinear the ilipsoas tendon and bursa were identified in short axis view, as were the femoral artery, nerve and vein in order to avoid these structures. The overlying skin was cleansed with betadine swabs x3 and the ultrasound probe was prepped in a sterile manner. The overlying soft tissue was anesthetized with 2ml 1% PF lidocaine without epinerphine using a 27g needle. Using a 22g 2 1/2 inch needle the ilipsoas bursa was accessed using an in-plane approach. Aspiration was negative for heme. The iliopsoas bursa was then injected with 2ml of 1ml 1% PF lidocaine and 40mg of kenalog. The needle was withdrawn. There were no complications from the procedure. Image was saved of the injection.      Harlo Esters DO  Physical Medicine and [address]

## 2022-11-10 NOTE — TELEPHONE ENCOUNTER
Right iliopsoas bursa steroid injection under US guidance   CPt code:  31995   Status: Pre authorization is not required    [facility] online for authorization of approval for above. Notification or Prior Authorization is not required for the requested services.     Reference # [id number]    Notified Approved Referrals for scheduling

## 2022-11-11 ENCOUNTER — TELEPHONE (OUTPATIENT)
Dept: NEUROLOGY | Facility: CLINIC | Age: 57
End: 2022-11-11

## 2022-11-11 ENCOUNTER — OFFICE VISIT (OUTPATIENT)
Dept: PHYSICAL THERAPY | Age: 57
End: 2022-11-11
Attending: ANESTHESIOLOGY
Payer: COMMERCIAL

## 2022-11-11 DIAGNOSIS — M51.36 DDD (DEGENERATIVE DISC DISEASE), LUMBAR: Primary | ICD-10-CM

## 2022-11-11 PROCEDURE — 97112 NEUROMUSCULAR REEDUCATION: CPT

## 2022-11-11 PROCEDURE — 97110 THERAPEUTIC EXERCISES: CPT

## 2022-11-11 PROCEDURE — 97140 MANUAL THERAPY 1/> REGIONS: CPT

## 2022-11-11 NOTE — PROGRESS NOTES
Date of Service: 11/11/2022  Dx: Hip pain, right (M25.551), DDD (degenerative disc disease), lumbar (M51.36)  Insurance: [facility] CORE/NAVIGATE  Insurance Limits: N/A  Visit #: 14  Authorized # of Visits: N/A  POC/Auth Expiration: N/A  Date of Last PN: 10/28/22 (Visit #10)  Authorizing Physician/Provider: Chantel Frederick   Next MD visit: N/A  Fall Risk: Standard         Precautions: None            Subjective: \"I went to the doctor's office and I saw the PA. He kept pushing me. Sometimes it's hard to describe the pain. There are times where it hurts and there are other times it doesn't. He gave me the injection, so I was happy about that. I'm getting another spinal epidural. I have to wait two weeks from yesterday at least. He told me to take it easy today, not to go to gym, and to take it [id number] in therapy today. \"     \"I didn't know that a hip replacement is an elective surgery. \"     Pain/Symptom Presentation:    Pain at rest: 2/10   Pain at worst: 7/10     Objective:     ROM:   Hip Motion PROM AROM    Right Left Right Left   Hip Flexion 120 130 N/A N/A   Hip Extension N/A N/A N/A N/A   Hip ABD N/A N/A N/A N/A   Hip ER (at 90deg hip flex) 55 60 N/A N/A   Hip IR (at 90deg hip flex) 15 23 N/A N/A   *indicates activity was associated with pain    Strength/MMT:  Hip Motion Strength    Right Left   Hip Flexion 4/5 4+/5   Hip Extension 4-/5 4/5   Hip ABD 4-/5 4/5   Hip ER 4/5 4/5   Hip IR  5/5 5/5   *indicates activity was associated with pain     Treatment:    Therapeutic Activities: x 4min  Split squat: 2 x 10 (B) with railing assist - mild burning in right hip flexor by end     Therapeutic Exercise: x 21min   Hip PROM - IR/ER x 10 (R)   Quadruped cat/camel: 1 x 10   Child's pose stretch: x 30\"   Supine hip flexor stretch off EOB: x 30\" (R)   Manual hamstring stretch: x 30\" (B)   Piriformis stretch: x 30\" (B)   DL bridging march: 1 x 10  Elastic band lateral walk: 2 laps x 20ft, red theraband  Elastic band PL reach: 2 x 10 (B), red theraband     Neuromuscular Re-education: x 10min  Trigger point release: (R) glute max, glute med, piriformis, hip flexor/iliospoas, ITB/VL  Dead bug: 2 x 5 (B)   Lateral band chop: 2 x 10 (B), black power band    Manual Therapy: x 10min  Manual hip traction joint mobs: Grade 3, 4 x 10\" (R)   Supine 90-90 posterior hip joint mobs: Grade 3, 3 x 10\" (R)   Lumbar PA accessory joint mobs - L1-L5: Grade 3, 4 x 10\" each     Provider Interactions/Education With Patient:   Discussed various options for self-massage. Patient has a massage gun at home and was advised to try using that more frequently to supplement soft tissue mobility gains achieved in therapy. Discussed reducing frequency to once a week moving forward to facilitate additional independence and self-management. Assessment: Oscar Vu arrives today after an injection to her hip flexor at her MD appt yesterday. She was with some TTP along the proximal ITB/VL this date. We discussed options for self-massage at home. The patient has a massage gun at home and was encouraged to use as needed. We continue to work on hip and lumbar spine mobility. We added some lumbar spine mobility and stretching exercises this date, which the patient tolerated well. The patient reports an easier time lifting her right leg up to assume supine figure-4 piriformis position this date. We added split squats to help with additional functional squatting and lunging strengthening since the patient has mentioned a few times there is a considerable difference in the weight she can use on the leg press machine on her right leg compared to the left. The patient would benefit from continued therapy for further progression in core, hip, and LE strength to facilitate improves tolerance to ADL's. Goals:   Short-Term Goals:  1. The patient will improve hip IR mobility to 25deg to facilitate improved tolerance to trunk rotation activities. Timeframe: 6-8 weeks. (IN PROGRESS 10/28/22.  Patient shows improved mobility on her left side, but is with remaining limitations on her right side. Updated goal timeframe.)   Long-Term Goals:  1. The patient will improve hip mobility to facilitate sitting for 3 hours at a time for occupational activities. Timeframe: 8 weeks. (IN PROGRESS 10/28/22. Patient is able to sit 2 hours at a time. Updated goal timeframe.)   2. The patient will improve LE strength and endurance to facilitate walking distances of 3 mile(s) daily for household and community ambulation and general health and wellness activities. Timeframe: 10 weeks. (IN PROGRESS 10/28/22. Patient is able to walk 2 miles at a time on the treadmill. Updated goal timeframe.)   3. Patient will improve LE mobility, strength, and single-leg stabilization to meet strength requirements for reciprocal stair navigation pattern with no asymmetries for ascending and descending 3 flights of stairs daily for household and community ambulation. Timeframe: 10 weeks. (IN PROGRESS 10/28/22. Patient still with some strength deficits causing remaining deviations with navigating stairs. Updated goal timeframe.)     Plan: Continue to work hip and low back mobility and flexibility as well as progressing CKC LE strengthening. HEP: Patient was issued a HEP handout 10/28/22 including S/L open book, modified downward dog, DL bridging march, dead bug SB nicole squeeze, elastic band lateral walk, and pallof press.      Charges: NMR x 1, MT x 1, Therex x 1  Total Timed Treatment: 45min    Total Treatment Time: 45min

## 2022-11-16 ENCOUNTER — OFFICE VISIT (OUTPATIENT)
Dept: PHYSICAL THERAPY | Age: 57
End: 2022-11-16
Attending: ANESTHESIOLOGY
Payer: COMMERCIAL

## 2022-11-16 PROCEDURE — 97112 NEUROMUSCULAR REEDUCATION: CPT

## 2022-11-16 PROCEDURE — 97110 THERAPEUTIC EXERCISES: CPT

## 2022-11-16 PROCEDURE — 97140 MANUAL THERAPY 1/> REGIONS: CPT

## 2022-11-16 NOTE — PROGRESS NOTES
Date of Service: 11/16/2022  Dx: Hip pain, right (M25.551), DDD (degenerative disc disease), lumbar (M51.36)  Insurance: [facility] CORE/NAVIGATE  Insurance Limits: N/A  Visit #: 15  Authorized # of Visits: N/A  POC/Auth Expiration: N/A  Date of Last PN: 10/28/22 (Visit #10)  Authorizing Physician/Provider: Ronn Alcaraz MD visit: N/A  Fall Risk: Standard         Precautions: None            Subjective: \"It's been a whirlwind of a day. I didn't relax at all day. I'm just exhausted. It's been tiring for me. I'm so busy. It's the busiest time of year for HR. \"     Pain/Symptom Presentation:    Pain at rest: 2/10   Pain at worst: 7/10     Objective:     ROM:   Hip Motion PROM AROM    Right Left Right Left   Hip Flexion 120 130 N/A N/A   Hip Extension N/A N/A N/A N/A   Hip ABD N/A N/A N/A N/A   Hip ER (at 90deg hip flex) 55 60 N/A N/A   Hip IR (at 90deg hip flex) 15 23 N/A N/A   *indicates activity was associated with pain    Strength/MMT:  Hip Motion Strength    Right Left   Hip Flexion 4/5 4+/5   Hip Extension 4-/5 4/5   Hip ABD 4-/5 4/5   Hip ER 4/5 4/5   Hip IR  5/5 5/5   *indicates activity was associated with pain     Treatment:    Therapeutic Exercise: x 20min   Hip PROM - IR/ER x 10 (R)   Quadruped cat/camel: 1 x 10   Child's pose stretch: x 30\"   Supine hip flexor stretch off EOB: x 30\" (R)   Manual hamstring stretch: x 30\" (B)   Piriformis stretch: x 30\" (B)   DL bridging march: 1 x 10  Elastic band lateral walk: 2 laps x 20ft, red theraband  Elastic band PL reach: 2 x 10 (B), red theraband, cues for full knee ext and keeping weight on stance leg    Neuromuscular Re-education: x 10min  Trigger point release: (R) glute max, glute med, piriformis, hip flexor/iliospoas  Dead bug: 2 x 5 (B)   Lateral band chop: 2 x 10 (B), black power band    Manual Therapy: x 15min  Soft tissue mobilization: (R) glute max, glute med, piriformis, hip flexor/iliospoas, ITB/VL, low back musculature and soft tissue  Manual hip traction joint mobs: Grade 3, 4 x 10\" (R)   Supine 90-90 posterior hip joint mobs: Grade 3, 3 x 10\" (R)   Lumbar PA accessory joint mobs - L1-L5: Grade 3, 4 x 10\" each     Provider Interactions/Education With Patient:   Verbal and manual cueing on proper performance of the prescribed exercises. Assessment: Josiane Salcedo continues to have complaints of pain in her low back and hip. We continue to work on hip and low back mobility and flexibility as well as progressing hip strengthening activities. The patient remains motivated to improve and continues to participate well in therapy. We will continue with therapy to continue to address remaining deficits for improved tolerance to ADL's and occupational activities. Goals:   Short-Term Goals:  1. The patient will improve hip IR mobility to 25deg to facilitate improved tolerance to trunk rotation activities. Timeframe: 6-8 weeks. (IN PROGRESS 10/28/22. Patient shows improved mobility on her left side, but is with remaining limitations on her right side. Updated goal timeframe.)   Long-Term Goals:  1. The patient will improve hip mobility to facilitate sitting for 3 hours at a time for occupational activities. Timeframe: 8 weeks. (IN PROGRESS 10/28/22. Patient is able to sit 2 hours at a time. Updated goal timeframe.)   2. The patient will improve LE strength and endurance to facilitate walking distances of 3 mile(s) daily for household and community ambulation and general health and wellness activities. Timeframe: 10 weeks. (IN PROGRESS 10/28/22. Patient is able to walk 2 miles at a time on the treadmill. Updated goal timeframe.)   3. Patient will improve LE mobility, strength, and single-leg stabilization to meet strength requirements for reciprocal stair navigation pattern with no asymmetries for ascending and descending 3 flights of stairs daily for household and community ambulation. Timeframe: 10 weeks. (IN PROGRESS 10/28/22.  Patient still with some strength deficits causing remaining deviations with navigating stairs. Updated goal timeframe.)     Plan: Continue to work hip and low back mobility and flexibility as well as progressing CKC LE strengthening. HEP: Patient was issued a HEP handout 10/28/22 including S/L open book, modified downward dog, DL bridging march, dead bug SB nicole squeeze, elastic band lateral walk, and pallof press.      Charges: NMR x 1, MT x 1, Therex x 1  Total Timed Treatment: 45min    Total Treatment Time: 45min

## 2022-11-18 ENCOUNTER — APPOINTMENT (OUTPATIENT)
Dept: PHYSICAL THERAPY | Age: 57
End: 2022-11-18
Attending: ANESTHESIOLOGY
Payer: COMMERCIAL

## 2022-11-18 ENCOUNTER — TELEPHONE (OUTPATIENT)
Dept: PHYSICAL THERAPY | Facility: HOSPITAL | Age: 57
End: 2022-11-18

## 2022-11-18 ENCOUNTER — PATIENT MESSAGE (OUTPATIENT)
Dept: PHYSICAL MEDICINE AND REHAB | Facility: CLINIC | Age: 57
End: 2022-11-18

## 2022-11-22 ENCOUNTER — PATIENT MESSAGE (OUTPATIENT)
Dept: ORTHOPEDICS CLINIC | Facility: CLINIC | Age: 57
End: 2022-11-22

## 2022-11-23 ENCOUNTER — APPOINTMENT (OUTPATIENT)
Dept: PHYSICAL THERAPY | Age: 57
End: 2022-11-23
Attending: ANESTHESIOLOGY
Payer: COMMERCIAL

## 2022-11-23 ENCOUNTER — HOSPITAL ENCOUNTER (OUTPATIENT)
Dept: GENERAL RADIOLOGY | Age: 57
Discharge: HOME | End: 2022-11-23
Attending: ORTHOPAEDIC SURGERY
Payer: COMMERCIAL

## 2022-11-23 DIAGNOSIS — M25.562 PAIN IN BOTH KNEES, UNSPECIFIED CHRONICITY: ICD-10-CM

## 2022-11-23 DIAGNOSIS — M25.561 PAIN IN BOTH KNEES, UNSPECIFIED CHRONICITY: ICD-10-CM

## 2022-11-23 PROCEDURE — 73564 X-RAY EXAM KNEE 4 OR MORE: CPT | Performed by: ORTHOPAEDIC SURGERY

## 2022-11-23 NOTE — TELEPHONE ENCOUNTER
Pt states she is asking if can try something else besides celebrex as it has not worked well for her in past.   Note: Pt sees pain clinic.

## 2022-11-23 NOTE — TELEPHONE ENCOUNTER
From: Pepper Stevens  To: Erma Murphy MD  Sent: 11/22/2022 9:49 PM CST  Subject: Celebrex    Hi Dr. Aron Harada is out of the Celebrex that I take, 100mg 2X daily. I was wondering if you would consider sending a prescription for something different that you recommend for pain, since the Celebrex gives me minimal relief and I can give it a try? Please let me know your thoughts.    Thank you,  Kitty Kwan

## 2022-11-25 DIAGNOSIS — M25.561 CHRONIC PAIN OF BOTH KNEES: ICD-10-CM

## 2022-11-25 DIAGNOSIS — M51.36 DDD (DEGENERATIVE DISC DISEASE), LUMBAR: ICD-10-CM

## 2022-11-25 DIAGNOSIS — G89.29 HIP PAIN, CHRONIC, RIGHT: ICD-10-CM

## 2022-11-25 DIAGNOSIS — M25.562 CHRONIC PAIN OF BOTH KNEES: ICD-10-CM

## 2022-11-25 DIAGNOSIS — G89.29 CHRONIC PAIN OF BOTH KNEES: ICD-10-CM

## 2022-11-25 DIAGNOSIS — M25.551 HIP PAIN, CHRONIC, RIGHT: ICD-10-CM

## 2022-11-25 RX ORDER — CELECOXIB 100 MG/1
100 CAPSULE ORAL 2 TIMES DAILY
Qty: 60 CAPSULE | Refills: 1 | Status: SHIPPED | OUTPATIENT
Start: 2022-11-25

## 2022-11-25 RX ORDER — DICLOFENAC SODIUM 75 MG/1
75 TABLET, DELAYED RELEASE ORAL 2 TIMES DAILY PRN
Qty: 60 TABLET | Refills: 0 | Status: SHIPPED | OUTPATIENT
Start: 2022-11-25

## 2022-11-26 NOTE — TELEPHONE ENCOUNTER
Diclofenac ordered  Please advise patient to stop celebrex if she is taking diclofenac - she should not take both together

## 2022-11-29 ENCOUNTER — HOSPITAL ENCOUNTER (OUTPATIENT)
Facility: HOSPITAL | Age: 57
Setting detail: HOSPITAL OUTPATIENT SURGERY
Discharge: HOME | End: 2022-11-29
Attending: ANESTHESIOLOGY | Admitting: ANESTHESIOLOGY
Payer: COMMERCIAL

## 2022-11-29 ENCOUNTER — APPOINTMENT (OUTPATIENT)
Dept: GENERAL RADIOLOGY | Facility: HOSPITAL | Age: 57
End: 2022-11-29
Attending: ANESTHESIOLOGY
Payer: COMMERCIAL

## 2022-11-29 VITALS
TEMPERATURE: 99 F | HEART RATE: 67 BPM | DIASTOLIC BLOOD PRESSURE: 77 MMHG | SYSTOLIC BLOOD PRESSURE: 125 MMHG | OXYGEN SATURATION: 98 % | RESPIRATION RATE: 16 BRPM

## 2022-11-29 PROCEDURE — 62323 NJX INTERLAMINAR LMBR/SAC: CPT | Performed by: ANESTHESIOLOGY

## 2022-11-29 PROCEDURE — 3E0R33Z INTRODUCTION OF ANTI-INFLAMMATORY INTO SPINAL CANAL, PERCUTANEOUS APPROACH: ICD-10-PCS | Performed by: ANESTHESIOLOGY

## 2022-11-29 RX ORDER — SODIUM CHLORIDE 9 MG/ML
INJECTION INTRAVENOUS
Status: DISCONTINUED | OUTPATIENT
Start: 2022-11-29 | End: 2022-11-29

## 2022-11-29 RX ORDER — LIDOCAINE HYDROCHLORIDE 10 MG/ML
INJECTION, SOLUTION EPIDURAL; INFILTRATION; INTRACAUDAL; PERINEURAL
Status: DISCONTINUED | OUTPATIENT
Start: 2022-11-29 | End: 2022-11-29

## 2022-11-29 RX ORDER — DEXAMETHASONE SODIUM PHOSPHATE 10 MG/ML
INJECTION, SOLUTION INTRAMUSCULAR; INTRAVENOUS
Status: DISCONTINUED | OUTPATIENT
Start: 2022-11-29 | End: 2022-11-29

## 2022-11-29 NOTE — DISCHARGE INSTRUCTIONS
Home Care Instructions Following Your Pain Procedure     Heidy Garduno,  It has been a pleasure to have you as our patient. To help you at home, you must follow these general discharge instructions. We will review these with you before you are discharged. It is our hope that you have a complete and uneventful recovery from our procedure. General Instructions:  What to Expect:  Bandages from your procedure today can be removed when you get home. Please avoid soaking and/or swimming for 24 hours. Showering is okay  It is normal to have increased pain symptoms and/or pain at injection site for up to 3-5 days after procedure, you can use heat or ice (20 minutes on 20 minutes off) for comfort. You may experience some temporary side effects which may include restlessness or insomnia, flushing of the face, or heart palpitations. Please contact the provider if these symptoms do not resolve within 3-4 days. Lightheadedness or nausea may occur and should resolve within 24 to 48 hours. If you develop a headache after treatment, rest, drink fluids (with caffeine, if possible) and take mild over-the-counter pain medication. If the headache does not improve with the above treatment, contact the physician. Home Medications:  Resume all previously prescribed medication. Please avoid taking NSAIDs (Non-Steriodal Anti-Inflammatory Drugs) such as:  Ibuprofen ( Advil, Motrin) Aleve (Naproxen), Diclofenac, Meloxicam for 6 hours after procedure. If you are on Coumadin (Warfarin) or any other anti-coagulant (or \"blood thinning\") medication such as Plavix (Clopidogrel), Xarelto (Rivaroxaban), Eliquis (Apixaban), Effient (Prasugrel) etc., restart on the following day from the procedure unless otherwise directed by your provider. If you are a diabetic, please increase the frequency of your glucose monitoring after the procedure as steroids may cause a temporary (2-3 day) increase in your blood sugar.   Contact your primary care physician if your blood sugar remains elevated as you may require some medication adjustment. Diet:  Resume your regular diet as tolerated. Activity: We recommend that you relax and rest during the rest of your procedure day. If you feel weakness in your arms or legs do not drive. Follow-up Appointment  Please schedule a follow-up visit within 3 to 4 weeks after your last procedure date. Question or Concerns:  Feel free to call our office with any questions or concerns at [phone] (option #2)    F[place]ia  Thank you for coming to [facility] for your procedure. The nurses try very hard to make sure you receive the best care possible. Your trust in them as well as us is greatly appreciated.     Thanks so much,   Dr. Stoney Paulson

## 2022-11-29 NOTE — OPERATIVE REPORT
[facility]  Operative Report  2022     Andres Webster [facility] [place] Patient Status:  Hospital Outpatient Surgery    10/20/1965 MRN [id number]   Location [address] [address] Attending Jesika Cotter MD   Hosp Day # 0 PCP Jocelyne Reaves MD     Indication: Lucian Calderon is a 65year old female with lumbar degenerative disc disease    Preoperative Diagnosis:  DDD (degenerative disc disease), lumbar [M51.36]    Postoperative Diagnosis: Same as above. Procedure performed: LUMBAR INTERLAMINAR EPIDURAL INJECTION with local     Anesthesia: Local .    EBL: Less than 1 ml. Procedure Description:  After reviewing the patient's history and performing a focused physical examination, the diagnosis and positive previous diagnostic tests were confirmed and contraindications such as infection and coagulopathy were ruled out. Following review of allergies and potential side effects and complications, including but not necessarily limited to infection, allergic reaction, local tissue breakdown, nerve injury, post-dural puncture headache and paresis, the patient consented for the procedure. The patient was brought to the procedure room in prone position. After sterile prep lumbar spine, the L5-S1 interspace was identified with the help of fluoroscopy. Local anesthetic was given by a 25 gauge 1.5 inch needle with 1% lidocaine in that space level. Thereafter, a 20 gauge Tuohy needle was introduced and advanced under fluoroscopy. The epidural space was accessed by using loss of resistance to air technique. The needle position was confirmed with AP and lateral view under fluoroscopy. Omnipaque 180 was injected in 1 mL volume. A good epidurogram was obtained. Thereafter, dexamethasone 10 mg with normal saline of 5 mL in total volume of 6 mL was injected through the Tuohy needle. The needle was flushed with 1 mL lidocaine. The needle was withdrawn with the stylet intact in situ. The needle's tip was intact. The patient tolerated the procedure very well without significant immediate complication. The patient's back was cleaned and sterile dressing was applied. The patient was discharged with an instruction to a responsible adult after discharge criteria were met. The patient was advised to contact us should any problems happen. The patient was also informed to go to the emergency room immediately if experiencing severe numbness or weakness in the extremities or experiencing bowel or bladder incontinence. Complications: None. Follow up: The patient was followed in the pain clinic as needed basis.           Isabel Beard MD

## 2022-11-30 ENCOUNTER — TELEPHONE (OUTPATIENT)
Dept: PAIN CLINIC | Facility: CLINIC | Age: 57
End: 2022-11-30

## 2022-11-30 ENCOUNTER — PATIENT MESSAGE (OUTPATIENT)
Dept: ORTHOPEDICS CLINIC | Facility: CLINIC | Age: 57
End: 2022-11-30

## 2022-11-30 NOTE — TELEPHONE ENCOUNTER
LM for post-procedure outreach call. Courtesy call given for patient status and any follow-up questions or concerns. Encouraged pt call back if needed. Office number provided. Procedure: LESI  Date: 11/29/22  Follow up Visit Scheduled: None. Encouraged pt to call to schedule.

## 2022-12-02 ENCOUNTER — OFFICE VISIT (OUTPATIENT)
Dept: PHYSICAL THERAPY | Age: 57
End: 2022-12-02
Attending: ANESTHESIOLOGY
Payer: COMMERCIAL

## 2022-12-02 PROCEDURE — 97112 NEUROMUSCULAR REEDUCATION: CPT

## 2022-12-02 PROCEDURE — 97140 MANUAL THERAPY 1/> REGIONS: CPT

## 2022-12-02 PROCEDURE — 97110 THERAPEUTIC EXERCISES: CPT

## 2022-12-02 NOTE — PROGRESS NOTES
Date of Service: 12/2/2022  Dx: Hip pain, right (M25.551), DDD (degenerative disc disease), lumbar (M51.36)  Insurance: [facility] CORE/NAVIGATE  Insurance Limits: N/A   Visit #: 16  Authorized # of Visits: N/A   POC/Auth Expiration: N/A   Date of Last PN: 10/28/22 (Visit #10)  Authorizing Physician/Provider: Marlys Alcaraz MD visit: N/A  Fall Risk: Standard         Precautions: None            Subjective: \"I had a back injection on Tuesday so I haven't worked out since then. Wednesday I felt great, but yesterday I felt terrible. I had x-rays of my knees and I'm almost hoping he's going to give me a shot in my knee too. My goal next year is to run and not have any of these issues. \"     Yesterday I was sitting and I felt the bone on bone rubbing in the hip after I was sitting for a long time. They do say that it may feel worse before it feels better. It felt like my hip got caught. I wish I could get another hip flexor injection. It took the edge off, but it's not great. \"     The patient is wondering if an MRI of the glute would show anything.      Pain/Symptom Presentation:    Pain at rest: 2/10   Pain at worst: 7/10     Objective:     ROM:   Hip Motion PROM AROM    Right Left Right Left   Hip Flexion 120 130 N/A N/A   Hip Extension N/A N/A N/A N/A   Hip ABD N/A N/A N/A N/A   Hip ER (at 90deg hip flex) 55 60 N/A N/A   Hip IR (at 90deg hip flex) 15 23 N/A N/A   *indicates activity was associated with pain    Strength/MMT:  Hip Motion Strength    Right Left   Hip Flexion 4/5 4+/5   Hip Extension 4-/5 4/5   Hip ABD 4-/5 4/5   Hip ER 4/5 4/5   Hip IR  5/5 5/5   *indicates activity was associated with pain     Treatment:    Therapeutic Exercise: x 20min   Hip PROM - IR/ER x 10 (R)   Prone hip ext (knee straight): 2 x 10 (B)   Quadruped cat/camel: 1 x 10   Supine hip flexor stretch off EOB: x 30\" (R)   SL bridge hold: 3 x 15\" (B)   S/L hip ABD: 2 x 10 (B)   Elastic band lateral walk: 2 laps x 20ft, red theraband    Neuromuscular Re-education: x 10min  Trigger point release: (R) glute max, glute med, piriformis, hip flexor/iliospoas  Dead bug: 2 x 5 (B)   Lateral band chop: 2 x 10 (B), black power band    Manual Therapy: x 15min  Soft tissue mobilization: (R) glute max, glute med, piriformis, hip flexor/iliospoas, ITB/VL, low back musculature and soft tissue  Manual hip traction joint mobs: Grade 3, 4 x 10\" (R)   Supine 90-90 posterior hip joint mobs: Grade 3, 3 x 10\" (R)   Lumbar PA accessory joint mobs - L1-L5: Grade 3, 4 x 10\" each     Provider Interactions/Education With Patient:   Verbal and manual cueing on proper performance of the prescribed exercises. Assessment: Marcin Phlegm arrives today after an injection to her back which provided some temporary relief, for a day. The patient was advised to allow 2 weeks for the injection to take it's maximum effect. The patient was taken through additional glute strengthening exercises and progressions, which she tolerated well. We continue to work on hip and low back mobility as well as progressing LE and core strength for improved tolerance to ADL's. Goals:   Short-Term Goals:  1. The patient will improve hip IR mobility to 25deg to facilitate improved tolerance to trunk rotation activities. Timeframe: 6-8 weeks. (IN PROGRESS 10/28/22. Patient shows improved mobility on her left side, but is with remaining limitations on her right side. Updated goal timeframe.)   Long-Term Goals:  1. The patient will improve hip mobility to facilitate sitting for 3 hours at a time for occupational activities. Timeframe: 8 weeks. (IN PROGRESS 10/28/22. Patient is able to sit 2 hours at a time. Updated goal timeframe.)   2. The patient will improve LE strength and endurance to facilitate walking distances of 3 mile(s) daily for household and community ambulation and general health and wellness activities. Timeframe: 10 weeks. (IN PROGRESS 10/28/22. Patient is able to walk 2 miles at a time on the treadmill. Updated goal timeframe.)   3. Patient will improve LE mobility, strength, and single-leg stabilization to meet strength requirements for reciprocal stair navigation pattern with no asymmetries for ascending and descending 3 flights of stairs daily for household and community ambulation. Timeframe: 10 weeks. (IN PROGRESS 10/28/22. Patient still with some strength deficits causing remaining deviations with navigating stairs. Updated goal timeframe.)     Plan: Continue to work hip mobility and hip and core strength as tolerated. Update HEP next session. HEP: Patient was issued a HEP handout 10/28/22 including S/L open book, modified downward dog, DL bridging march, dead bug SB nicole squeeze, elastic band lateral walk, and pallof press.      Charges: NMR x 1, MT x 1, Therex x 1  Total Timed Treatment: 45min    Total Treatment Time: 45min

## 2022-12-09 ENCOUNTER — HOSPITAL ENCOUNTER (OUTPATIENT)
Dept: MAMMOGRAPHY | Age: 57
Discharge: HOME | End: 2022-12-09
Attending: NURSE PRACTITIONER
Payer: COMMERCIAL

## 2022-12-09 DIAGNOSIS — Z12.31 ENCOUNTER FOR SCREENING MAMMOGRAM FOR MALIGNANT NEOPLASM OF BREAST: ICD-10-CM

## 2022-12-09 PROCEDURE — 77063 BREAST TOMOSYNTHESIS BI: CPT | Performed by: NURSE PRACTITIONER

## 2022-12-09 PROCEDURE — 77067 SCR MAMMO BI INCL CAD: CPT | Performed by: NURSE PRACTITIONER

## 2022-12-15 ENCOUNTER — PATIENT MESSAGE (OUTPATIENT)
Dept: ORTHOPEDICS CLINIC | Facility: CLINIC | Age: 57
End: 2022-12-15

## 2022-12-15 DIAGNOSIS — M25.552 BILATERAL HIP PAIN: Primary | ICD-10-CM

## 2022-12-15 DIAGNOSIS — M25.551 BILATERAL HIP PAIN: Primary | ICD-10-CM

## 2022-12-15 NOTE — TELEPHONE ENCOUNTER
Bilateral hip xrays ordered.   Patient aware to arrive early to complete imaging  Per patient left hip is worse

## 2022-12-16 ENCOUNTER — HOSPITAL ENCOUNTER (OUTPATIENT)
Dept: BONE DENSITY | Age: 57
Discharge: HOME | End: 2022-12-16
Attending: NURSE PRACTITIONER
Payer: COMMERCIAL

## 2022-12-16 ENCOUNTER — OFFICE VISIT (OUTPATIENT)
Dept: PHYSICAL THERAPY | Age: 57
End: 2022-12-16
Attending: ANESTHESIOLOGY
Payer: COMMERCIAL

## 2022-12-16 DIAGNOSIS — Z13.820 SCREENING FOR OSTEOPOROSIS: ICD-10-CM

## 2022-12-16 PROCEDURE — 97110 THERAPEUTIC EXERCISES: CPT

## 2022-12-16 PROCEDURE — 97140 MANUAL THERAPY 1/> REGIONS: CPT

## 2022-12-16 PROCEDURE — 97112 NEUROMUSCULAR REEDUCATION: CPT

## 2022-12-16 PROCEDURE — 77080 DXA BONE DENSITY AXIAL: CPT | Performed by: NURSE PRACTITIONER

## 2022-12-16 NOTE — PROGRESS NOTES
Date of Service: 12/16/2022  Dx: Hip pain, right (M25.551), DDD (degenerative disc disease), lumbar (M51.36)  Insurance: [facility] Limits: N/A   Visit #: 16  Authorized # of Visits: N/A   POC/Auth Expiration: N/A   Date of Last PN: 10/28/22 (Visit #10)  Authorizing Physician/Provider: Yudi Alcaraz MD visit: 12/19/22  Fall Risk: Standard         Precautions: None            Subjective: The patient reports that all her pain has shifted to her left side of her back and the left groin. \"I think I might have a pinched nerve in my low back. My right side has never felt better. I can't take it any more. \" The patient reports that she is walking with a limp. \"It's hurting my aura. I've worked out for so long, I can't let this go. \" She reports that she changed her doctor's appt to focusing on her hips rather than the injection in her knee. \" The patient repots that if she walks with her left foot turned it, it will help with her back pain.       Pain/Symptom Presentation:    Pain at rest: 2-3/10   Pain at worst: 7/10     Objective:     ROM: (last updated 12/16/22)  Hip Motion PROM AROM    Right Left Right Left   Hip Flexion 120 130 N/A N/A   Hip Extension N/A N/A N/A N/A   Hip ABD N/A N/A N/A N/A   Hip ER (at 90deg hip flex) 55 55 N/A N/A   Hip IR (at 90deg hip flex) 15 25 N/A N/A   *indicates activity was associated with pain    Strength/MMT:  Hip Motion Strength    Right Left   Hip Flexion 4/5 4+/5   Hip Extension 4-/5 4/5   Hip ABD 4-/5 4/5   Hip ER 4/5 4/5   Hip IR  5/5 5/5   *indicates activity was associated with pain     Low Back Special Tests: (last updated 12/16/22)  Forward spinal flexion: (-)  Repeated forward spinal flexion: (-)  Spinal extension: (-)  Repeated spinal extension: (-)  Slump test:(R) (-), (L) (-)  SLR: (R) (-), (L) (-)  Hip FADIR (hip scour): (R) (+), (L) (+)    Treatment:    Therapeutic Exercise: x 20min   Hip PROM - IR/ER x 10 (R)   Child's pose stretch: x 30\"   Quadruped cat/camel: 1 x 10   Prone hip ext (knee straight): 2 x 10 (B)   Supine hip flexor stretch off EOB: unable to complete due to LBP  S/L hip ABD: 2 x 10 (B)     Neuromuscular Re-education: x 10min  Trigger point release: (R) glute max, glute med, piriformis, hip flexor/iliospoas  SIJ gapping MET: x 2 - with cavitation  Dead bug: 2 x 5 (B)     Manual Therapy: x 15min  Soft tissue mobilization: (R) glute max, glute med, piriformis, hip flexor/iliospoas, ITB/VL, lumbar paraspinals, low back soft tissue   Manual hip traction joint mobs: Grade 3, 4 x 10\" (R)   Lumbar PA accessory joint mobs - L1-L5: Grade 3, 4 x 10\" each     Provider Interactions/Education With Patient:   Assessed patient's low back pain complaints. Assessment: Yue Larios arrived today with complaints of left-sided low back and hip pain versus the right. She reports no known mechanism of injury, but is walking with a bit of a limp upon entering the clinic and notes new complaints of weakness on her left side with leg press exercise at the gym. She doesn't demonstrate any loss of hip mobility, though with mild groin pain with hip FADIR. She denies TTP along the SIJ line, but this was after a cavitation noted with SIJ gapping MET technique. The patient was advised to discuss the progression in her pain. We will re-assess the patient next session and adjust POC accordingly. Goals:   Short-Term Goals:  1. The patient will improve hip IR mobility to 25deg to facilitate improved tolerance to trunk rotation activities. Timeframe: 6-8 weeks. (IN PROGRESS 10/28/22. Patient shows improved mobility on her left side, but is with remaining limitations on her right side. Updated goal timeframe.)   Long-Term Goals:  1. The patient will improve hip mobility to facilitate sitting for 3 hours at a time for occupational activities. Timeframe: 8 weeks. (IN PROGRESS 10/28/22. Patient is able to sit 2 hours at a time. Updated goal timeframe.)   2.  The patient will improve LE strength and endurance to facilitate walking distances of 3 mile(s) daily for household and community ambulation and general health and wellness activities. Timeframe: 10 weeks. (IN PROGRESS 10/28/22. Patient is able to walk 2 miles at a time on the treadmill. Updated goal timeframe.)   3. Patient will improve LE mobility, strength, and single-leg stabilization to meet strength requirements for reciprocal stair navigation pattern with no asymmetries for ascending and descending 3 flights of stairs daily for household and community ambulation. Timeframe: 10 weeks. (IN PROGRESS 10/28/22. Patient still with some strength deficits causing remaining deviations with navigating stairs. Updated goal timeframe.)     Plan: Follow-up on MD appt. Re-assess patient and adjust POC accordingly. HEP: Patient was issued a HEP handout 10/28/22 including S/L open book, modified downward dog, DL bridging march, dead bug SB nicole squeeze, elastic band lateral walk, and pallof press.      Charges: NMR x 1, MT x 1, Therex x 1  Total Timed Treatment: 45min    Total Treatment Time: 45min

## 2022-12-19 ENCOUNTER — OFFICE VISIT (OUTPATIENT)
Dept: ORTHOPEDICS CLINIC | Facility: CLINIC | Age: 57
End: 2022-12-19
Payer: COMMERCIAL

## 2022-12-19 ENCOUNTER — PATIENT MESSAGE (OUTPATIENT)
Dept: ORTHOPEDICS CLINIC | Facility: CLINIC | Age: 57
End: 2022-12-19

## 2022-12-19 ENCOUNTER — HOSPITAL ENCOUNTER (OUTPATIENT)
Dept: GENERAL RADIOLOGY | Age: 57
Discharge: HOME | End: 2022-12-19
Attending: ORTHOPAEDIC SURGERY
Payer: COMMERCIAL

## 2022-12-19 VITALS — WEIGHT: 153.38 LBS | OXYGEN SATURATION: 99 % | BODY MASS INDEX: 25.87 KG/M2 | HEIGHT: 64.5 IN | HEART RATE: 93 BPM

## 2022-12-19 DIAGNOSIS — M70.71 ILIOPSOAS BURSITIS OF RIGHT HIP: ICD-10-CM

## 2022-12-19 DIAGNOSIS — M16.12 PRIMARY OSTEOARTHRITIS OF LEFT HIP: Primary | ICD-10-CM

## 2022-12-19 DIAGNOSIS — M16.11 PRIMARY OSTEOARTHRITIS OF RIGHT HIP: ICD-10-CM

## 2022-12-19 DIAGNOSIS — M25.551 BILATERAL HIP PAIN: ICD-10-CM

## 2022-12-19 DIAGNOSIS — M25.552 BILATERAL HIP PAIN: ICD-10-CM

## 2022-12-19 PROCEDURE — 73523 X-RAY EXAM HIPS BI 5/> VIEWS: CPT | Performed by: ORTHOPAEDIC SURGERY

## 2022-12-19 RX ORDER — CELECOXIB 100 MG/1
CAPSULE ORAL
COMMUNITY
Start: 2022-12-10

## 2022-12-19 RX ORDER — CELECOXIB 100 MG/1
100 CAPSULE ORAL 2 TIMES DAILY
Qty: 60 CAPSULE | Refills: 0 | Status: SHIPPED | OUTPATIENT
Start: 2022-12-19

## 2022-12-19 RX ORDER — TRIAMCINOLONE ACETONIDE 40 MG/ML
40 INJECTION, SUSPENSION INTRA-ARTICULAR; INTRAMUSCULAR ONCE
Status: COMPLETED | OUTPATIENT
Start: 2022-12-19 | End: 2022-12-19

## 2022-12-19 RX ADMIN — TRIAMCINOLONE ACETONIDE 40 MG: 40 INJECTION, SUSPENSION INTRA-ARTICULAR; INTRAMUSCULAR at 10:03:00

## 2022-12-19 NOTE — PROGRESS NOTES
[facility] Progress Note    Subjective: Returns for hip pain. She reports that she is now developed pain worse on the left hip than the right. She reports pain in the proximal lateral thigh, points near greater trochanter. She does get pain deep into the groin especially with abduction external rotation. She also has pain up into her low back. She reports pain in the buttock on both sides left worse than right. The previous right hip injection did provide relief of some of her pain. The left is now worse than the right and she does feel that she is limping. She has been taking Celebrex 100 mg tablets. She feels that she has muscular pains throughout the thighs, anteriorly and posteriorly. Objective: Patient is awake alert, pleasant and conversational.  Passive hip range of motion is 30-35 external bilaterally without pain, internal 10 without pain. Palpation about the hip flexors, abductors, SI joint is in the location of pain, but alleviates the symptoms. Ba's test is negative. No tenderness down the IT band. Imaging: X-rays of the left hip and pelvis personally viewed, independently interpreted and radiology report read. Demonstrates mild to moderate left hip osteoarthritis with probable joint space loss. Assessment/Plan: 50-year-old female with bilateral hip/thigh and low back pain. Unclear etiology for the symptoms at this point. She did have right hip injection that did provide relief for a period of time, however now her left hip is more bothersome. She does have left hip osteoarthritis and would like to proceed with an injection on this side both for diagnostic and therapeutic purposes. This was performed in clinic today. She would like to repeat the right hip injection, but we discussed doing this next week in order to not confound the results from her left hip injection from a diagnostic standpoint.   We did discuss that there might be multiple etiologies of her pain issues including the low back. She did have steroid injection from Dr. Peter Ely that provided some relief of her low back pain for 4 days, however began having pain in the thigh again afterwards. May also consider rheumatology referral for diffuse muscular pain work-up. She will return next week for right hip injection appointment.     Juan M Broussard MD, [address] Orthopedic Surgery  Phone [phone]  Fax [phone]

## 2022-12-21 RX ORDER — CYCLOBENZAPRINE HCL 10 MG
10 TABLET ORAL 2 TIMES DAILY PRN
Qty: 30 TABLET | Refills: 0 | Status: SHIPPED | OUTPATIENT
Start: 2022-12-21

## 2022-12-21 NOTE — TELEPHONE ENCOUNTER
Refill Request    Medication request: cyclobenzaprine 10 MG Oral Tab    LOV:11/10/2022 Cesar Hernandez DO   RTC: N/A  NOV: Visit date not found      [place]: 11/10/22 #07    UDS: (if applicable): None  Pain contract: None    LOV plan (if weaning or changing medications): Flexeril 10mg BID PRN muscle tightness/spasms prescribed.

## 2022-12-23 ENCOUNTER — APPOINTMENT (OUTPATIENT)
Dept: PHYSICAL THERAPY | Age: 57
End: 2022-12-23
Attending: ANESTHESIOLOGY
Payer: COMMERCIAL

## 2022-12-23 ENCOUNTER — OFFICE VISIT (OUTPATIENT)
Dept: PHYSICAL THERAPY | Age: 57
End: 2022-12-23
Attending: ANESTHESIOLOGY
Payer: COMMERCIAL

## 2022-12-23 PROCEDURE — 97140 MANUAL THERAPY 1/> REGIONS: CPT

## 2022-12-23 PROCEDURE — 97112 NEUROMUSCULAR REEDUCATION: CPT

## 2022-12-23 NOTE — PROGRESS NOTES
Date of Service: 12/23/2022  Dx: Hip pain, right (M25.551), DDD (degenerative disc disease), lumbar (M51.36)  Insurance: [facility] Limits: N/A   Visit #: 18  Authorized # of Visits: N/A   POC/Auth Expiration: N/A   Date of Last PN: 10/28/22 (Visit #10)  Authorizing Physician/Provider: Tamara Alcaraz MD visit: 12/19/22  Fall Risk: Standard         Precautions: None            Subjective: The patient reports that she is walking with a limp still, \"it's only a little bit better. \" The patient reports that she hasn't been to the doctor in a week and a half. \"I went to the orthopedic and he spent a lot of time with me. He gave me an injection in my left hip\" which started acting up after her last back injection. The patient reports that x-rays didn't show any progression. She is taking the Celebrex three times a day. \"He is going to put me on Meloxicam. He wanted to continue what I'm doing so we can determine how the injection helps. \" She reports that he did discuss a few options, including a possible ablation or consulting with a rheumatologist. \"I've been in tears all week. \"    The patient reports that she is having left-sided low back pain.       Pain/Symptom Presentation:    Pain at rest: 2-3/10   Pain at worst: 7/10     Objective:     ROM: (last updated 12/16/22)  Hip Motion PROM AROM    Right Left Right Left   Hip Flexion 120 130 N/A N/A   Hip Extension N/A N/A N/A N/A   Hip ABD N/A N/A N/A N/A   Hip ER (at 90deg hip flex) 55 55 N/A N/A   Hip IR (at 90deg hip flex) 15 25 N/A N/A   *indicates activity was associated with pain    Strength/MMT:  Hip Motion Strength    Right Left   Hip Flexion 4/5 4+/5   Hip Extension 4-/5 4/5   Hip ABD 4-/5 4/5   Hip ER 4/5 4/5   Hip IR  5/5 5/5   *indicates activity was associated with pain     Low Back Special Tests: (last updated 12/16/22)  Forward spinal flexion: (-)  Spinal extension: (+)  Slump test:(R) (-), (L) (-)  SLR: (R) (-), (L) (-)  Hip FADIR (hip scour): (R) (+), (L) (+)    Treatment:    Therapeutic Exercise: x 3min   Hip PROM - IR/ER x 10 (R)     Neuromuscular Re-education: x 17min  Trigger point release: (B) glute max, glute med, piriformis, hip flexor/iliospoas  SIJ gapping MET: x 1 - with cavitation    Manual Therapy: x 20min  Soft tissue mobilization: (R) glute max, glute med, piriformis, hip flexor/iliospoas, ITB/VL, lumbar paraspinals, low back soft tissue   Manual hip traction joint mobs: Grade 3, 4 x 10\" (R)   Prone hip PA joint mobs: Grade 3, 4 x 10\" (R)   Lumbar PA accessory joint mobs - L1-L5: Grade 3, 4 x 10\" each   Thoracic PA accessory joint mobs - T1-T12: Grade 3, 4 x 10\" each    Provider Interactions/Education With Patient:   Assessed patient's low back pain complaints. Assessment: Alexia Ortiz arrives today after an appt with her orthopedic who gave her injection for her left hip. Possible differential diagnoses or treatment interventions discussed include polymyalgia or an ablation. The patient is still with left sided leg pain, whereas previous treatment focused on her right side. The patient's symptoms include pain along the  SIJ line, pain in the glutes, groin pain, as well as tightness in her thighs. Given the nature of her travelling pain, it is possible that it's referred pain from her back. The patient was educated that even if her pain generator is a low back issue, the mobility deficits in her hips still needs to be addressed to limit rotational forces at her lumbar spine and possible aggravating her condition. The patient's treatment session focused on pain management. She is currently walking with a limp and has not been able to workout in over a week and a half. Given the patient's remaining symptoms, she would benefit from continued therapy to continue to address hips but also treat new onset of left-sided low back and leg pain. We will do a formal progress summary/re-evaluation next session. Goals:   Short-Term Goals:  1.  The patient will improve hip IR mobility to 25deg to facilitate improved tolerance to trunk rotation activities. Timeframe: 6-8 weeks. (IN PROGRESS 10/28/22. Patient shows improved mobility on her left side, but is with remaining limitations on her right side. Updated goal timeframe.)   Long-Term Goals:  1. The patient will improve hip mobility to facilitate sitting for 3 hours at a time for occupational activities. Timeframe: 8 weeks. (IN PROGRESS 10/28/22. Patient is able to sit 2 hours at a time. Updated goal timeframe.)   2. The patient will improve LE strength and endurance to facilitate walking distances of 3 mile(s) daily for household and community ambulation and general health and wellness activities. Timeframe: 10 weeks. (IN PROGRESS 10/28/22. Patient is able to walk 2 miles at a time on the treadmill. Updated goal timeframe.)   3. Patient will improve LE mobility, strength, and single-leg stabilization to meet strength requirements for reciprocal stair navigation pattern with no asymmetries for ascending and descending 3 flights of stairs daily for household and community ambulation. Timeframe: 10 weeks. (IN PROGRESS 10/28/22. Patient still with some strength deficits causing remaining deviations with navigating stairs. Updated goal timeframe.)     Plan: Patient was scheduled for additional therapy appt. Will to re-evaluation/progress summary next session. HEP: Patient was issued a HEP handout 10/28/22 including S/L open book, modified downward dog, DL bridging march, dead bug SB nicole squeeze, elastic band lateral walk, and pallof press.      Charges: MT x 2, Therex x 1  Total Timed Treatment: 40min    Total Treatment Time: 40min

## 2022-12-28 ENCOUNTER — OFFICE VISIT (OUTPATIENT)
Dept: ORTHOPEDICS CLINIC | Facility: CLINIC | Age: 57
End: 2022-12-28
Payer: COMMERCIAL

## 2022-12-28 ENCOUNTER — TELEPHONE (OUTPATIENT)
Dept: NEUROLOGY | Facility: CLINIC | Age: 57
End: 2022-12-28

## 2022-12-28 ENCOUNTER — OFFICE VISIT (OUTPATIENT)
Dept: PAIN CLINIC | Facility: CLINIC | Age: 57
End: 2022-12-28
Payer: COMMERCIAL

## 2022-12-28 VITALS — SYSTOLIC BLOOD PRESSURE: 122 MMHG | DIASTOLIC BLOOD PRESSURE: 80 MMHG | OXYGEN SATURATION: 98 % | HEART RATE: 96 BPM

## 2022-12-28 VITALS — OXYGEN SATURATION: 99 % | HEART RATE: 96 BPM

## 2022-12-28 DIAGNOSIS — M25.561 CHRONIC PAIN OF RIGHT KNEE: ICD-10-CM

## 2022-12-28 DIAGNOSIS — G89.29 CHRONIC PAIN OF RIGHT KNEE: ICD-10-CM

## 2022-12-28 DIAGNOSIS — M16.11 PRIMARY OSTEOARTHRITIS OF RIGHT HIP: Primary | ICD-10-CM

## 2022-12-28 DIAGNOSIS — M51.36 DDD (DEGENERATIVE DISC DISEASE), LUMBAR: Primary | ICD-10-CM

## 2022-12-28 PROCEDURE — 20610 DRAIN/INJ JOINT/BURSA W/O US: CPT | Performed by: ORTHOPAEDIC SURGERY

## 2022-12-28 PROCEDURE — 3079F DIAST BP 80-89 MM HG: CPT | Performed by: ANESTHESIOLOGY

## 2022-12-28 PROCEDURE — 3074F SYST BP LT 130 MM HG: CPT | Performed by: ANESTHESIOLOGY

## 2022-12-28 PROCEDURE — 99215 OFFICE O/P EST HI 40 MIN: CPT | Performed by: ANESTHESIOLOGY

## 2022-12-28 PROCEDURE — 20611 DRAIN/INJ JOINT/BURSA W/US: CPT | Performed by: ORTHOPAEDIC SURGERY

## 2022-12-28 RX ORDER — CELECOXIB 200 MG/1
200 CAPSULE ORAL 2 TIMES DAILY PRN
Qty: 60 CAPSULE | Refills: 0 | Status: SHIPPED | OUTPATIENT
Start: 2022-12-28

## 2022-12-28 RX ORDER — TRIAMCINOLONE ACETONIDE 40 MG/ML
40 INJECTION, SUSPENSION INTRA-ARTICULAR; INTRAMUSCULAR ONCE
Status: COMPLETED | OUTPATIENT
Start: 2022-12-28 | End: 2022-12-28

## 2022-12-28 RX ADMIN — TRIAMCINOLONE ACETONIDE 40 MG: 40 INJECTION, SUSPENSION INTRA-ARTICULAR; INTRAMUSCULAR at 15:42:00

## 2022-12-28 RX ADMIN — TRIAMCINOLONE ACETONIDE 40 MG: 40 INJECTION, SUSPENSION INTRA-ARTICULAR; INTRAMUSCULAR at 15:50:00

## 2022-12-28 NOTE — TELEPHONE ENCOUNTER
[facility] portal is only providing up until 12/31/2022.  Will initiate PA 1/1/2023

## 2022-12-28 NOTE — PROGRESS NOTES
Patient presents in office today with reported pain in back, legs, knees    Current pain level reported = 5/10, typically worse     Last reported dose of n/a      Narcotic Contract renewal n/a    Urine Drug screen n/a

## 2022-12-28 NOTE — PROCEDURES
Ms. Ruth Cedillo reports that the left hip is feeling much better following the injection. It did take around 4 days to get relief, but she is no longer limping. She has set up with Dr. Jesus Thompson to undergo radiofrequency ablation procedure. He did discuss with her possible evaluation by spine surgery, and she has looking to set up an appointment with Dr. Cat Brownlee. In the interim she did ask if we could manage her Celebrex prescription which I replied yes to. We will plan for 200 mg twice daily as needed. After informed consent, the patient's right hip was marked, prepped with topical antiseptic, and locally anesthetized with skin refrigerant followed by injection of 1% lidocaine to create a skin wheal anteriorly at an insertion site a few fingerbreadths lateral to the femoral pulse, along the trajectory of the femoral neck. Next, the area was re-prepped with topical antiseptic, and ultrasound guidance was performed to direct a 20 gauge spinal needle into the hip joint at the junction of the femoral head and neck, after which a mixture of 1mL 40mg/mL Kenalog, 2mL 1% lidocaine and 2mL 0.5% marcaine was injected while visualizing the fluid under ultrasound. Confirmatory imaging was saved to the patient's chart. A band-aid was applied. The patient tolerated the procedure well. After informed consent, the patient's right knee was marked, locally anesthetized with skin refrigerant, prepped with topical antiseptic, and injected with a mixture of 1mL 40mg/mL Kenalog, 2mL 1% lidocaine and 2mL 0.5% marcaine through the inferolateral portal.  A band-aid was applied. The patient tolerated the procedure well.     Darcy Liang MD, [address] [place] [facility]  Phone [phone]  Fax [phone]

## 2022-12-30 ENCOUNTER — OFFICE VISIT (OUTPATIENT)
Dept: PHYSICAL THERAPY | Age: 57
End: 2022-12-30
Attending: ANESTHESIOLOGY
Payer: COMMERCIAL

## 2022-12-30 PROCEDURE — 97110 THERAPEUTIC EXERCISES: CPT

## 2022-12-30 PROCEDURE — 97140 MANUAL THERAPY 1/> REGIONS: CPT

## 2022-12-30 PROCEDURE — 97112 NEUROMUSCULAR REEDUCATION: CPT

## 2022-12-30 NOTE — PROGRESS NOTES
PHYSICAL THERAPY DISCHARGE:     Date of Service: 12/30/2022  Dx: Hip pain, right (M25.551), DDD (degenerative disc disease), lumbar (M51.36)  Insurance: [facility] Limits: N/A   Visit #: 19  Authorized # of Visits: N/A   POC/Auth Expiration: N/A   Date of Last PN: 10/28/22 (Visit #10)  Authorizing Physician/Provider: Brad Rivera MD  Next MD visit: 12/19/22  Fall Risk: Standard         Precautions: None            Subjective: \"I feel so good today. From Tuesday to Sunday, I was having pain in my left hip. On Sunday I was feeling better. The pain medication doctor didn't feel that the epidurals are helping. He thinks that it's structural so I think I'm going to make today my last session. I know what to do. I did talk to the doctor about the ablation and he said that he was willing to try it. He recommended I go see Dr. Mihai Quintnaa, a spinal surgeon. \"     The patient had an injection in her right knee and hip. \"The hip injections relieve me of pain, and I know it's not a long term solution, but it helps. \" The patient reports that she was only able to bowl half of a game with her kids. She felt that she had to walk with shorter steps. The patient reports that she is taking 300mg of Celebrex even though her script was for 200mg. The patient plans to consult with an MD from [place] that her brother recommended to her to see \"their thoughts on if there's anything I can do to avoid surgery. \"    Pain/Symptom Presentation:    Pain at rest: 2-3/10   Pain at worst: 7/10     Objective:     Outcomes Measure(s):    FOTO:  57.3653/100 (previously 64.9439/100)     ROM: (last updated 12/16/22)  Hip Motion PROM AROM    Right Left Right Left   Hip Flexion 120 130 N/A N/A   Hip Extension N/A N/A N/A N/A   Hip ABD N/A N/A N/A N/A   Hip ER (at 90deg hip flex) 55 55 N/A N/A   Hip IR (at 90deg hip flex) 15 25 N/A N/A   *indicates activity was associated with pain    Strength/MMT:  Hip Motion Strength    Right Left Hip Flexion 4/5 4+/5   Hip Extension 4-/5 4/5   Hip ABD 4-/5 4/5   Hip ER 4/5 4/5   Hip IR  5/5 5/5   *indicates activity was associated with pain     Low Back Special Tests: (last updated 12/16/22)  Forward spinal flexion: (-)  Spinal extension: (+)  Slump test:(R) (-), (L) (-)  SLR: (R) (-), (L) (-)  Hip FADIR (hip scour): (R) (+), (L) (+)    Treatment:    Therapeutic Exercise: x 15min   Hip PROM - IR/ER x 10 (R)   Child's pose stretch: x 30\"   Quadruped cat/camel: 1 x 10   Modified downward dog: 1 x 10 x 5\"   Prone hip ext (knee straight): 2 x 10 (B)   S/L hip ABD: 2 x 10 (B)     Neuromuscular Re-education: x 15min  Trigger point release: (B) glute max, glute med, piriformis, hip flexor/iliospoas  SIJ gapping MET: x 1 - with cavitation  Dead bug: 2 x 5 (B)   Isometric bear crawl hold: 2 x 30\"   Pallof press: 2 x 10 (B), black    Manual Therapy: x 15min  Soft tissue mobilization: (R) glute max, glute med, piriformis, hip flexor/iliospoas, ITB/VL, lumbar paraspinals, low back soft tissue   Manual hip traction joint mobs: Grade 3, 4 x 10\" (R)   Prone hip PA joint mobs: Grade 3, 4 x 10\" (R)   Lumbar PA accessory joint mobs - L1-L5: Grade 3, 4 x 10\" each   Thoracic PA accessory joint mobs - T1-T12: Grade 3, 4 x 10\" each    Provider Interactions/Education With Patient:   Provided patient with a written copy of exercise instruction which was also documented in patient's electronic medical chart. Assessment: Yamilex Figueroa is a 20-year-old female that presented to physical therapy evaluation with complaints of right hip and low back pain. The patient has been seen my multiple physicians, receiving multiple epidurals and trigger point injections. She is managing her pain with 300mg of Celebrex. She is still with hip and low back pain that limits her ADL's as well as her ability to participate in workouts at the gym.  The patient has been seen for 19 sessions in physical therapy with some improvements, but with similar pain complaints. At this point, the patient would like to be discharged from therapy while she considers other medical treatment options. Goals:   Short-Term Goals:  1. The patient will improve hip IR mobility to 25deg to facilitate improved tolerance to trunk rotation activities. Timeframe: 6-8 weeks. (NOT MET 12/30/22)  Long-Term Goals:  1. The patient will improve hip mobility to facilitate sitting for 3 hours at a time for occupational activities. Timeframe: 8 weeks. (PARTIALLY MET 12/30/22)  2. The patient will improve LE strength and endurance to facilitate walking distances of 3 mile(s) daily for household and community ambulation and general health and wellness activities. Timeframe: 10 weeks. (PARTIALLY MET 12/30/22)  3. Patient will improve LE mobility, strength, and single-leg stabilization to meet strength requirements for reciprocal stair navigation pattern with no asymmetries for ascending and descending 3 flights of stairs daily for household and community ambulation. Timeframe: 10 weeks. (PARTIALLY MET 12/30/22)    Plan: Patient will be discharged from therapy at this time. HEP: Patient was issued a HEP handout 12/30/22 as documented below.       Charges: MT x 1, NMR x 1, Therex x 1  Total Timed Treatment: 40min    Total Treatment Time: 40min

## 2023-01-04 NOTE — TELEPHONE ENCOUNTER
Initiated PA with Roseann Line for [facility] for bilateral L3, L4, L5 MBB [id number], [id number], [id number] / Uploaded clinical documentation / Case # [id number]

## 2023-01-05 NOTE — TELEPHONE ENCOUNTER
Prior authorization request completed for: Bilateral L3, L4, L5 MBB  Authorization # [id number]  Authorization dates: 1/4/2023 - 4/4/2023  CPT codes approved: 51101 (23839, 58841 no PA needed)  Number of visits/dates of service approved: 1  Physician: Dr. Melinda Altman   Location: [facility] [phone] and initiated PA with Sanjana Cheatham and he informed me as well as Germain Alvarez and Maria Esther Drummond that only [id number] needs PA. Germain Alvarez stated she noted it for bilateral     Call Ref#: [id number], [id number],   Representative Name: Richard Dela Cruz     Patient can be scheduled. Routed to Navigator.

## 2023-01-12 ENCOUNTER — APPOINTMENT (OUTPATIENT)
Dept: GENERAL RADIOLOGY | Facility: HOSPITAL | Age: 58
End: 2023-01-12
Attending: ANESTHESIOLOGY
Payer: COMMERCIAL

## 2023-01-12 ENCOUNTER — HOSPITAL ENCOUNTER (OUTPATIENT)
Facility: HOSPITAL | Age: 58
Setting detail: HOSPITAL OUTPATIENT SURGERY
Discharge: HOME | End: 2023-01-12
Attending: ANESTHESIOLOGY | Admitting: ANESTHESIOLOGY
Payer: COMMERCIAL

## 2023-01-12 VITALS
TEMPERATURE: 99 F | BODY MASS INDEX: 25.8 KG/M2 | HEIGHT: 64.5 IN | RESPIRATION RATE: 20 BRPM | HEART RATE: 66 BPM | WEIGHT: 153 LBS | DIASTOLIC BLOOD PRESSURE: 85 MMHG | OXYGEN SATURATION: 97 % | SYSTOLIC BLOOD PRESSURE: 129 MMHG

## 2023-01-12 PROCEDURE — BR161ZZ FLUOROSCOPY OF LUMBAR FACET JOINT(S) USING LOW OSMOLAR CONTRAST: ICD-10-PCS | Performed by: ANESTHESIOLOGY

## 2023-01-12 PROCEDURE — 64495 INJ PARAVERT F JNT L/S 3 LEV: CPT | Performed by: ANESTHESIOLOGY

## 2023-01-12 PROCEDURE — 64494 INJ PARAVERT F JNT L/S 2 LEV: CPT | Performed by: ANESTHESIOLOGY

## 2023-01-12 PROCEDURE — 64493 INJ PARAVERT F JNT L/S 1 LEV: CPT | Performed by: ANESTHESIOLOGY

## 2023-01-12 PROCEDURE — 3E0U3BZ INTRODUCTION OF ANESTHETIC AGENT INTO JOINTS, PERCUTANEOUS APPROACH: ICD-10-PCS | Performed by: ANESTHESIOLOGY

## 2023-01-12 RX ORDER — BUPIVACAINE HYDROCHLORIDE 5 MG/ML
INJECTION, SOLUTION EPIDURAL; INTRACAUDAL
Status: DISCONTINUED | OUTPATIENT
Start: 2023-01-12 | End: 2023-01-12

## 2023-01-12 RX ORDER — LIDOCAINE HYDROCHLORIDE 10 MG/ML
INJECTION, SOLUTION EPIDURAL; INFILTRATION; INTRACAUDAL; PERINEURAL
Status: DISCONTINUED | OUTPATIENT
Start: 2023-01-12 | End: 2023-01-12

## 2023-01-12 NOTE — DISCHARGE INSTRUCTIONS
Home Care Instructions Following Your Pain Procedure     Susy Martinez,  It has been a pleasure to have you as our patient. To help you at home, you must follow these general discharge instructions. We will review these with you before you are discharged. It is our hope that you have a complete and uneventful recovery from our procedure. General Instructions:  What to Expect:  Bandages from your procedure today can be removed when you get home. Please avoid soaking and/or swimming for 24 hours. Showering is okay  It is normal to have increased pain symptoms and/or pain at injection site for up to 3-5 days after procedure, you can use heat or ice (20 minutes on 20 minutes off) for comfort. You may experience some temporary side effects which may include restlessness or insomnia, flushing of the face, or heart palpitations. Please contact the provider if these symptoms do not resolve within 3-4 days. Lightheadedness or nausea may occur and should resolve within 24 to 48 hours. If you develop a headache after treatment, rest, drink fluids (with caffeine, if possible) and take mild over-the-counter pain medication. If the headache does not improve with the above treatment, contact the physician. Home Medications:  Resume all previously prescribed medication. Please avoid taking NSAIDs (Non-Steriodal Anti-Inflammatory Drugs) such as:  Ibuprofen ( Advil, Motrin) Aleve (Naproxen), Diclofenac, Meloxicam for 6 hours after procedure. If you are on Coumadin (Warfarin) or any other anti-coagulant (or \"blood thinning\") medication such as Plavix (Clopidogrel), Xarelto (Rivaroxaban), Eliquis (Apixaban), Effient (Prasugrel) etc., restart on the following day from the procedure unless otherwise directed by your provider. If you are a diabetic, please increase the frequency of your glucose monitoring after the procedure as steroids may cause a temporary (2-3 day) increase in your blood sugar.   Contact your primary care physician if your blood sugar remains elevated as you may require some medication adjustment. Diet:  Resume your regular diet as tolerated. Activity: We recommend that you relax and rest during the rest of your procedure day. If you feel weakness in your arms or legs do not drive. Follow-up Appointment  Please schedule a follow-up visit within 3 to 4 weeks after your last procedure date. Question or Concerns:  Feel free to call our office with any questions or concerns at [phone] (option #2)    Fozia  Thank you for coming to [facility] for your procedure. The nurses try very hard to make sure you receive the best care possible. Your trust in them as well as us is greatly appreciated.     Thanks so much,   Dr. Farrell Lat

## 2023-01-23 ENCOUNTER — PATIENT MESSAGE (OUTPATIENT)
Dept: ORTHOPEDICS CLINIC | Facility: CLINIC | Age: 58
End: 2023-01-23

## 2023-01-23 DIAGNOSIS — M54.50 CHRONIC BILATERAL LOW BACK PAIN, UNSPECIFIED WHETHER SCIATICA PRESENT: ICD-10-CM

## 2023-01-23 DIAGNOSIS — G89.29 CHRONIC BILATERAL LOW BACK PAIN, UNSPECIFIED WHETHER SCIATICA PRESENT: ICD-10-CM

## 2023-01-23 DIAGNOSIS — M16.11 PRIMARY OSTEOARTHRITIS OF RIGHT HIP: Primary | ICD-10-CM

## 2023-01-23 DIAGNOSIS — M16.12 PRIMARY OSTEOARTHRITIS OF LEFT HIP: ICD-10-CM

## 2023-01-24 RX ORDER — CELECOXIB 200 MG/1
200 CAPSULE ORAL 2 TIMES DAILY PRN
Qty: 60 CAPSULE | Refills: 0 | Status: SHIPPED | OUTPATIENT
Start: 2023-01-24

## 2023-01-30 NOTE — TELEPHONE ENCOUNTER
Therapy script written  Would recommend performing metabolic panel in 3 months and then repeat every 6 months if staying on NSAID

## 2023-02-09 ENCOUNTER — HOSPITAL ENCOUNTER (OUTPATIENT)
Facility: HOSPITAL | Age: 58
Setting detail: HOSPITAL OUTPATIENT SURGERY
Discharge: HOME | End: 2023-02-09
Attending: ANESTHESIOLOGY | Admitting: ANESTHESIOLOGY
Payer: COMMERCIAL

## 2023-02-09 ENCOUNTER — APPOINTMENT (OUTPATIENT)
Dept: GENERAL RADIOLOGY | Facility: HOSPITAL | Age: 58
End: 2023-02-09
Attending: ANESTHESIOLOGY
Payer: COMMERCIAL

## 2023-02-09 VITALS
RESPIRATION RATE: 18 BRPM | SYSTOLIC BLOOD PRESSURE: 139 MMHG | DIASTOLIC BLOOD PRESSURE: 85 MMHG | HEIGHT: 64.5 IN | OXYGEN SATURATION: 96 % | HEART RATE: 79 BPM | TEMPERATURE: 97 F | WEIGHT: 153 LBS | BODY MASS INDEX: 25.8 KG/M2

## 2023-02-09 PROCEDURE — 64494 INJ PARAVERT F JNT L/S 2 LEV: CPT | Performed by: ANESTHESIOLOGY

## 2023-02-09 PROCEDURE — 64493 INJ PARAVERT F JNT L/S 1 LEV: CPT | Performed by: ANESTHESIOLOGY

## 2023-02-09 PROCEDURE — 64495 INJ PARAVERT F JNT L/S 3 LEV: CPT | Performed by: ANESTHESIOLOGY

## 2023-02-09 PROCEDURE — 3E0T3BZ INTRODUCTION OF ANESTHETIC AGENT INTO PERIPHERAL NERVES AND PLEXI, PERCUTANEOUS APPROACH: ICD-10-PCS | Performed by: ANESTHESIOLOGY

## 2023-02-09 RX ORDER — LIDOCAINE HYDROCHLORIDE 10 MG/ML
INJECTION, SOLUTION EPIDURAL; INFILTRATION; INTRACAUDAL; PERINEURAL
Status: DISCONTINUED | OUTPATIENT
Start: 2023-02-09 | End: 2023-02-09

## 2023-02-09 RX ORDER — METHYLPREDNISOLONE ACETATE 40 MG/ML
INJECTION, SUSPENSION INTRA-ARTICULAR; INTRALESIONAL; INTRAMUSCULAR; SOFT TISSUE
Status: DISCONTINUED | OUTPATIENT
Start: 2023-02-09 | End: 2023-02-09

## 2023-02-09 RX ORDER — BUPIVACAINE HYDROCHLORIDE 5 MG/ML
INJECTION, SOLUTION EPIDURAL; INTRACAUDAL
Status: DISCONTINUED | OUTPATIENT
Start: 2023-02-09 | End: 2023-02-09

## 2023-02-09 RX ORDER — SODIUM CHLORIDE, SODIUM LACTATE, POTASSIUM CHLORIDE, CALCIUM CHLORIDE 600; 310; 30; 20 MG/100ML; MG/100ML; MG/100ML; MG/100ML
100 INJECTION, SOLUTION INTRAVENOUS CONTINUOUS
Status: DISCONTINUED | OUTPATIENT
Start: 2023-02-09 | End: 2023-02-09

## 2023-02-09 NOTE — DISCHARGE INSTRUCTIONS
Home Care Instructions Following Your Pain Procedure     Rakan Dasilva,  It has been a pleasure to have you as our patient. To help you at home, you must follow these general discharge instructions. We will review these with you before you are discharged. It is our hope that you have a complete and uneventful recovery from our procedure. General Instructions:  What to Expect:  Bandages from your procedure today can be removed when you get home. Please avoid soaking and/or swimming for 24 hours. Showering is okay  It is normal to have increased pain symptoms and/or pain at injection site for up to 3-5 days after procedure, you can use heat or ice (20 minutes on 20 minutes off) for comfort. You may experience some temporary side effects which may include restlessness or insomnia, flushing of the face, or heart palpitations. Please contact the provider if these symptoms do not resolve within 3-4 days. Lightheadedness or nausea may occur and should resolve within 24 to 48 hours. If you develop a headache after treatment, rest, drink fluids (with caffeine, if possible) and take mild over-the-counter pain medication. If the headache does not improve with the above treatment, contact the physician. Home Medications:  Resume all previously prescribed medication. Please avoid taking NSAIDs (Non-Steriodal Anti-Inflammatory Drugs) such as:  Ibuprofen ( Advil, Motrin) Aleve (Naproxen), Diclofenac, Meloxicam for 6 hours after procedure. If you are on Coumadin (Warfarin) or any other anti-coagulant (or \"blood thinning\") medication such as Plavix (Clopidogrel), Xarelto (Rivaroxaban), Eliquis (Apixaban), Effient (Prasugrel) etc., restart on the following day from the procedure unless otherwise directed by your provider. If you are a diabetic, please increase the frequency of your glucose monitoring after the procedure as steroids may cause a temporary (2-3 day) increase in your blood sugar.   Contact your primary care physician if your blood sugar remains elevated as you may require some medication adjustment. Diet:  Resume your regular diet as tolerated. Activity: We recommend that you relax and rest during the rest of your procedure day. If you feel weakness in your arms or legs do not drive. Follow-up Appointment  Please schedule a follow-up visit within 3 to 4 weeks after your last procedure date. Question or Concerns:  Feel free to call our office with any questions or concerns at [phone] (option #2)    Fozia  Thank you for coming to [facility] for your procedure. The nurses try very hard to make sure you receive the best care possible. Your trust in them as well as us is greatly appreciated.     Thanks so much,   Dr. Rosalina Gabriel

## 2023-02-10 ENCOUNTER — TELEPHONE (OUTPATIENT)
Dept: PAIN CLINIC | Facility: CLINIC | Age: 58
End: 2023-02-10

## 2023-02-10 NOTE — TELEPHONE ENCOUNTER
Spoke with patient and relayed 's recommendations to schedule video visit. Patient VU.      Scheduled patient for Video Visit on 02/24/23 @  080MM

## 2023-02-20 ENCOUNTER — PATIENT MESSAGE (OUTPATIENT)
Dept: INTERNAL MEDICINE CLINIC | Facility: CLINIC | Age: 58
End: 2023-02-20

## 2023-02-20 DIAGNOSIS — M16.11 PRIMARY OSTEOARTHRITIS OF RIGHT HIP: ICD-10-CM

## 2023-02-20 DIAGNOSIS — E78.5 DYSLIPIDEMIA: ICD-10-CM

## 2023-02-20 DIAGNOSIS — M16.12 PRIMARY OSTEOARTHRITIS OF LEFT HIP: ICD-10-CM

## 2023-02-20 DIAGNOSIS — M70.71 ILIOPSOAS BURSITIS OF RIGHT HIP: ICD-10-CM

## 2023-02-20 RX ORDER — CELECOXIB 200 MG/1
200 CAPSULE ORAL 2 TIMES DAILY PRN
Qty: 60 CAPSULE | Refills: 0 | Status: SHIPPED | OUTPATIENT
Start: 2023-02-20

## 2023-02-20 RX ORDER — CYCLOBENZAPRINE HCL 10 MG
10 TABLET ORAL 2 TIMES DAILY PRN
Qty: 30 TABLET | Refills: 0 | Status: SHIPPED | OUTPATIENT
Start: 2023-02-20

## 2023-02-20 NOTE — TELEPHONE ENCOUNTER
Refill Request    Medication request: cyclobenzaprine 10 MG Oral Tab    LOV:11/10/2022 Kerry Botello DO   RTC: N/A  NOV: N/A  [place]: 12/21/22 #15 days    UDS: (if applicable): N/A  Pain contract: N/A    LOV plan (if weaning or changing medications): Flexeril 10mg BID PRN muscle tightness/spasms prescribed.

## 2023-02-21 RX ORDER — ATORVASTATIN CALCIUM 10 MG/1
10 TABLET, FILM COATED ORAL DAILY
Qty: 90 TABLET | Refills: 2 | Status: SHIPPED | OUTPATIENT
Start: 2023-02-21 | End: 2023-05-22

## 2023-02-21 NOTE — TELEPHONE ENCOUNTER
LOV 11/9/22 with SD. No future appt scheduled at this time. Last Lipid on 11/4/22. Pended Atorvastatin 10mg nightly.

## 2023-02-21 NOTE — TELEPHONE ENCOUNTER
From: Gabby Childs  To: URBANO Perez  Sent: 2/20/2023 1:39 PM CST  Subject: Cholesterol Meds    Conrad Schroeder,    I need a refill soon for my Cholesterol meds and was hoping you could send a script to [facility] for a 90 day supply. This will save me money and time from having to go to refill it. I appreciate it.     Thank you,  Yue Larios

## 2023-02-24 ENCOUNTER — TELEMEDICINE (OUTPATIENT)
Dept: PAIN CLINIC | Facility: CLINIC | Age: 58
End: 2023-02-24
Payer: COMMERCIAL

## 2023-02-24 DIAGNOSIS — M51.36 DDD (DEGENERATIVE DISC DISEASE), LUMBAR: Primary | ICD-10-CM

## 2023-02-24 PROCEDURE — 99214 OFFICE O/P EST MOD 30 MIN: CPT | Performed by: ANESTHESIOLOGY

## 2023-02-27 ENCOUNTER — TELEPHONE (OUTPATIENT)
Dept: NEUROLOGY | Facility: CLINIC | Age: 58
End: 2023-02-27

## 2023-02-27 DIAGNOSIS — M51.36 DDD (DEGENERATIVE DISC DISEASE), LUMBAR: Primary | ICD-10-CM

## 2023-02-27 NOTE — TELEPHONE ENCOUNTER
Initiated PA with Liliya Terry from [facility] for Bilateral L4, L5 RFA [id number], [id number] / Uploaded clinical documentation / Case # [id number]

## 2023-02-28 NOTE — TELEPHONE ENCOUNTER
Prior authorization request completed for: Bilateral L4, L5 RFA   Authorization # [id number]  Authorization dates: 2/27/2023 - 5/28/2023   CPT codes approved: 16178 / 79912  Number of visits/dates of service approved: 1  Physician: Dr. Delmi Jarrett   Location: [facility]     Patient can be scheduled. Routed to Navigator.

## 2023-03-16 ENCOUNTER — HOSPITAL ENCOUNTER (OUTPATIENT)
Facility: HOSPITAL | Age: 58
Setting detail: HOSPITAL OUTPATIENT SURGERY
Discharge: HOME | End: 2023-03-16
Attending: ANESTHESIOLOGY | Admitting: ANESTHESIOLOGY
Payer: COMMERCIAL

## 2023-03-16 ENCOUNTER — APPOINTMENT (OUTPATIENT)
Dept: GENERAL RADIOLOGY | Facility: HOSPITAL | Age: 58
End: 2023-03-16
Attending: ANESTHESIOLOGY
Payer: COMMERCIAL

## 2023-03-16 VITALS
WEIGHT: 153 LBS | TEMPERATURE: 99 F | HEART RATE: 82 BPM | RESPIRATION RATE: 16 BRPM | BODY MASS INDEX: 25.8 KG/M2 | DIASTOLIC BLOOD PRESSURE: 87 MMHG | SYSTOLIC BLOOD PRESSURE: 147 MMHG | OXYGEN SATURATION: 100 % | HEIGHT: 64.5 IN

## 2023-03-16 PROCEDURE — 64635 DESTROY LUMB/SAC FACET JNT: CPT | Performed by: ANESTHESIOLOGY

## 2023-03-16 PROCEDURE — 64636 DESTROY L/S FACET JNT ADDL: CPT | Performed by: ANESTHESIOLOGY

## 2023-03-16 PROCEDURE — 3E0T3TZ INTRODUCTION OF DESTRUCTIVE AGENT INTO PERIPHERAL NERVES AND PLEXI, PERCUTANEOUS APPROACH: ICD-10-PCS | Performed by: ANESTHESIOLOGY

## 2023-03-16 RX ORDER — LIDOCAINE HYDROCHLORIDE 10 MG/ML
INJECTION, SOLUTION EPIDURAL; INFILTRATION; INTRACAUDAL; PERINEURAL
Status: DISCONTINUED | OUTPATIENT
Start: 2023-03-16 | End: 2023-03-16

## 2023-03-16 RX ORDER — SODIUM CHLORIDE, SODIUM LACTATE, POTASSIUM CHLORIDE, CALCIUM CHLORIDE 600; 310; 30; 20 MG/100ML; MG/100ML; MG/100ML; MG/100ML
100 INJECTION, SOLUTION INTRAVENOUS CONTINUOUS
Status: DISCONTINUED | OUTPATIENT
Start: 2023-03-16 | End: 2023-03-16

## 2023-03-16 RX ORDER — DIPHENHYDRAMINE HYDROCHLORIDE 50 MG/ML
50 INJECTION INTRAMUSCULAR; INTRAVENOUS ONCE AS NEEDED
Status: DISCONTINUED | OUTPATIENT
Start: 2023-03-16 | End: 2023-03-16

## 2023-03-16 RX ORDER — MIDAZOLAM HYDROCHLORIDE 1 MG/ML
INJECTION INTRAMUSCULAR; INTRAVENOUS
Status: DISCONTINUED | OUTPATIENT
Start: 2023-03-16 | End: 2023-03-16

## 2023-03-16 RX ORDER — METHYLPREDNISOLONE ACETATE 40 MG/ML
INJECTION, SUSPENSION INTRA-ARTICULAR; INTRALESIONAL; INTRAMUSCULAR; SOFT TISSUE
Status: DISCONTINUED | OUTPATIENT
Start: 2023-03-16 | End: 2023-03-16

## 2023-03-16 RX ORDER — ONDANSETRON 2 MG/ML
4 INJECTION INTRAMUSCULAR; INTRAVENOUS ONCE AS NEEDED
Status: DISCONTINUED | OUTPATIENT
Start: 2023-03-16 | End: 2023-03-16

## 2023-03-16 NOTE — DISCHARGE INSTRUCTIONS
You have been given medication that makes you sleepy. This may have included both pain medication and sleeping medication. Most of the effects have worn off but you may still have some drowsiness for the next 6 to 8 hours. Home Care  Follow these guidelines when you get home:    --Don't drink any alcohol for the next 24 hours. --Don't drive, operate dangerous machinery, make important business or personal    decisions, or sign legal documents during the next 24 hours. Follow Up Care  Follow up with your healthcare provider if you are not alert and back to your usual level of activity within 12 hours    When to seek medical advice  Call your healthcare provider right away if any of these occur:    --Drowsiness that gets worse  --Weakness or dizziness that gets worse  --Repeated vomiting  --You can not be awakened    Home Care Instructions Following Your Pain Procedure     Phong Morales,  It has been a pleasure to have you as our patient. To help you at home, you must follow these general discharge instructions. We will review these with you before you are discharged. It is our hope that you have a complete and uneventful recovery from our procedure. General Instructions:  What to Expect:  Bandages from your procedure today can be removed when you get home. Please avoid soaking and/or swimming for 24 hours. Showering is okay  It is normal to have increased pain symptoms and/or pain at injection site for up to 3-5 days after procedure, you can use heat or ice (20 minutes on 20 minutes off) for comfort. You may experience some temporary side effects which may include restlessness or insomnia, flushing of the face, or heart palpitations. Please contact the provider if these symptoms do not resolve within 3-4 days. Lightheadedness or nausea may occur and should resolve within 24 to 48 hours.   If you develop a headache after treatment, rest, drink fluids (with caffeine, if possible) and take mild over-the-counter pain medication. If the headache does not improve with the above treatment, contact the physician. Home Medications:  Resume all previously prescribed medication. Please avoid taking NSAIDs (Non-Steriodal Anti-Inflammatory Drugs) such as:  Ibuprofen ( Advil, Motrin) Aleve (Naproxen), Diclofenac, Meloxicam for 6 hours after procedure. If you are on Coumadin (Warfarin) or any other anti-coagulant (or \"blood thinning\") medication such as Plavix (Clopidogrel), Xarelto (Rivaroxaban), Eliquis (Apixaban), Effient (Prasugrel) etc., restart on the following day from the procedure unless otherwise directed by your provider. If you are a diabetic, please increase the frequency of your glucose monitoring after the procedure as steroids may cause a temporary (2-3 day) increase in your blood sugar. Contact your primary care physician if your blood sugar remains elevated as you may require some medication adjustment. Diet:  Resume your regular diet as tolerated. Activity: We recommend that you relax and rest during the rest of your procedure day. If you feel weakness in your arms or legs do not drive. Follow-up Appointment  Please schedule a follow-up visit within 3 to 4 weeks after your last procedure date. Question or Concerns:  Feel free to call our office with any questions or concerns at [phone] (option #2)    Fozia  Thank you for coming to [facility] for your procedure. The nurses try very hard to make sure you receive the best care possible. Your trust in them as well as us is greatly appreciated.     Thanks so much,   Dr. Ana Laura Patel

## 2023-03-16 NOTE — OPERATIVE REPORT
[facility]  Operative Report  3/16/2023     Lisha Timmons [facility] Patient Status:  Hospital Outpatient Surgery    10/20/1965 MRN [id number]   Location [id number] [place] [address] Attending Loly Chen MD   Hosp Day # 0 PCP Ute Yates MD     Indication: Dom Lin is a 65year old female with lumbar degenerative disc disease and low back pain    Preoperative Diagnosis:  DDD (degenerative disc disease), lumbar [M51.36]    Postoperative Diagnosis: Same as above. Procedure performed: RADIOFREQUENCY ABLATION OF BILATERAL LUMBAR MEDIAL BRANCH with sedation  (L3-4, L4-5, L5-S1)    Anesthesia: Local and IV Sedation. EBL: Less than 1 ml. Procedure Description:   After reviewing the patient's history and performing a focused physical examination, the diagnosis was confirmed and contraindications such as infection and coagulopathy were ruled out. Following review of potential side effects and complications, including but not necessarily limited to infection, allergic reaction, local tissue breakdown, nerve injury, and paresis, the patient indicated they understood and agreed to proceed. After obtaining the informed consent, the patient was brought to the procedure room and monitored. Per my order and under my supervision, the patient was sedated with intermittent intravenous doses of versed and fentanyl. The vital signs were monitored and recorded by an experienced RN. The procedure started after the patient was adequately sedated. The moderate intravenous conscious sedation was provided for 15 minutes. The patient was placed prone on the table. The patient's back was prepped and draped in sterile fashion. Attention was turned towards the patient's right side first.  The skin was anesthetized via 25-gauge 1.5\" needle with approximately 2 mL of 1% lidocaine for local anesthesia.   Under fluoroscopic guidance, a 22-gauge, 100-mm SMK needle was advanced to the junction of the superior aspect of the L3 transverse process and the lateral aspect of the same level superior articular process at right L3 level . The needle was then walked off the bony tissue and advanced 2 to 3 mm to lie along the path of the L3 medial branch nerve. AP and lateral radiographs were obtained to document proper needle position. Sensory and motor stimulation were then performed, which elicited deep local back discomfort but no evidence of motor stimulation in the gluteal muscles or extremities. At this point, 0.5 mL of 1% lidocaine was injected to the tissues around the tip of the SMK needle. Subsequently, a medial branch nerve denervation was performed for 90 seconds at 80 degrees centigrade without complication. Similar procedures were performed at right L4-L5 and L5-S1 level. The radiofrequency probe was removed with the needle left in place and 1% lidocaine and 5 mg methylprednisolone was injected through each needle. The needles were removed with tips intact. This is then repeated in a similar fashion on the contralateral side. The patient tolerated the procedure very well. There was no subjective or objective loss of motor strength. The patient was observed until discharge criteria met. Discharge instructions were given and patient was released to a responsible adult. Complications: None. Follow up: The patient will be followed in the pain clinic as needed basis.       Emerson Weston MD

## 2023-03-17 ENCOUNTER — TELEPHONE (OUTPATIENT)
Dept: PAIN CLINIC | Facility: CLINIC | Age: 58
End: 2023-03-17

## 2023-03-17 NOTE — TELEPHONE ENCOUNTER
Courtesy called placed to patient for post procedure follow up. Patient stated she is doing well, has no questions or concerns. Asked pt if she would like to schedule a f/u appt as we advise follow-up 3-4 weeks after RFA. Pt was agreeable so scheduled her for appt. Pt verbalized understanding to call with any questions or concerns.       Procedure: RADIOFREQUENCY ABLATION OF BILATERAL LUMBAR MEDIAL BRANCH with sedation  Date: 3/16/23  Follow up Visit Scheduled: 4/10/23

## 2023-03-18 DIAGNOSIS — M16.11 PRIMARY OSTEOARTHRITIS OF RIGHT HIP: ICD-10-CM

## 2023-03-18 DIAGNOSIS — M16.12 PRIMARY OSTEOARTHRITIS OF LEFT HIP: ICD-10-CM

## 2023-03-21 DIAGNOSIS — M16.11 PRIMARY OSTEOARTHRITIS OF RIGHT HIP: ICD-10-CM

## 2023-03-21 DIAGNOSIS — M16.12 PRIMARY OSTEOARTHRITIS OF LEFT HIP: ICD-10-CM

## 2023-03-21 NOTE — TELEPHONE ENCOUNTER
Last OV: 12/28/22  Last refill date: 02/20/23     #/refills: 60/0  Upcoming appt:    Future Appointments   Date Time Provider Ester Mary   4/3/2023  3:15 PM MD QUYNH MorganIPajosue Estevez Carac Pregnancy And Lactation Text: This medication is Pregnancy Category X and contraindicated in pregnancy and in women who may become pregnant. It is unknown if this medication is excreted in breast milk. Erivedge Pregnancy And Lactation Text: This medication is Pregnancy Category X and is absolutely contraindicated during pregnancy. It is unknown if it is excreted in breast milk. Ilumya Counseling: I discussed with the patient the risks of tildrakizumab including but not limited to immunosuppression, malignancy, posterior leukoencephalopathy syndrome, and serious infections.  The patient understands that monitoring is required including a PPD at baseline and must alert us or the primary physician if symptoms of infection or other concerning signs are noted. Cyclophosphamide Pregnancy And Lactation Text: This medication is Pregnancy Category D and it isn't considered safe during pregnancy. This medication is excreted in breast milk. Cimzia Counseling:  I discussed with the patient the risks of Cimzia including but not limited to immunosuppression, allergic reactions and infections.  The patient understands that monitoring is required including a PPD at baseline and must alert us or the primary physician if symptoms of infection or other concerning signs are noted. Nsaids Counseling: NSAID Counseling: I discussed with the patient that NSAIDs should be taken with food. Prolonged use of NSAIDs can result in the development of stomach ulcers.  Patient advised to stop taking NSAIDs if abdominal pain occurs.  The patient verbalized understanding of the proper use and possible adverse effects of NSAIDs.  All of the patient's questions and concerns were addressed. Ilumya Pregnancy And Lactation Text: The risk during pregnancy and breastfeeding is uncertain with this medication. Quinolones Pregnancy And Lactation Text: This medication is Pregnancy Category C and it isn't know if it is safe during pregnancy. It is also excreted in breast milk. Xolair Pregnancy And Lactation Text: This medication is Pregnancy Category B and is considered safe during pregnancy. This medication is excreted in breast milk. Hydroxyzine Pregnancy And Lactation Text: This medication is not safe during pregnancy and should not be taken. It is also excreted in breast milk and breast feeding isn't recommended. Topical Clindamycin Counseling: Patient counseled that this medication may cause skin irritation or allergic reactions.  In the event of skin irritation, the patient was advised to reduce the amount of the drug applied or use it less frequently.   The patient verbalized understanding of the proper use and possible adverse effects of clindamycin.  All of the patient's questions and concerns were addressed. Valtrex Pregnancy And Lactation Text: this medication is Pregnancy Category B and is considered safe during pregnancy. This medication is not directly found in breast milk but it's metabolite acyclovir is present. Cyclosporine Pregnancy And Lactation Text: This medication is Pregnancy Category C and it isn't know if it is safe during pregnancy. This medication is excreted in breast milk. Cosentyx Counseling:  I discussed with the patient the risks of Cosentyx including but not limited to worsening of Crohn's disease, immunosuppression, allergic reactions and infections.  The patient understands that monitoring is required including a PPD at baseline and must alert us or the primary physician if symptoms of infection or other concerning signs are noted. Topical Clindamycin Pregnancy And Lactation Text: This medication is Pregnancy Category B and is considered safe during pregnancy. It is unknown if it is excreted in breast milk. Azithromycin Counseling:  I discussed with the patient the risks of azithromycin including but not limited to GI upset, allergic reaction, drug rash, diarrhea, and yeast infections. Methotrexate Counseling:  Patient counseled regarding adverse effects of methotrexate including but not limited to nausea, vomiting, abnormalities in liver function tests. Patients may develop mouth sores, rash, diarrhea, and abnormalities in blood counts. The patient understands that monitoring is required including LFT's and blood counts.  There is a rare possibility of scarring of the liver and lung problems that can occur when taking methotrexate. Persistent nausea, loss of appetite, pale stools, dark urine, cough, and shortness of breath should be reported immediately. Patient advised to discontinue methotrexate treatment at least three months before attempting to become pregnant.  I discussed the need for folate supplements while taking methotrexate.  These supplements can decrease side effects during methotrexate treatment. The patient verbalized understanding of the proper use and possible adverse effects of methotrexate.  All of the patient's questions and concerns were addressed. Simponi Counseling:  I discussed with the patient the risks of golimumab including but not limited to myelosuppression, immunosuppression, autoimmune hepatitis, demyelinating diseases, lymphoma, and serious infections.  The patient understands that monitoring is required including a PPD at baseline and must alert us or the primary physician if symptoms of infection or other concerning signs are noted. Griseofulvin Pregnancy And Lactation Text: This medication is Pregnancy Category X and is known to cause serious birth defects. It is unknown if this medication is excreted in breast milk but breast feeding should be avoided. Quinolones Counseling:  I discussed with the patient the risks of fluoroquinolones including but not limited to GI upset, allergic reaction, drug rash, diarrhea, dizziness, photosensitivity, yeast infections, liver function test abnormalities, tendonitis/tendon rupture. Clindamycin Counseling: I counseled the patient regarding use of clindamycin as an antibiotic for prophylactic and/or therapeutic purposes. Clindamycin is active against numerous classes of bacteria, including skin bacteria. Side effects may include nausea, diarrhea, gastrointestinal upset, rash, hives, yeast infections, and in rare cases, colitis. Gabapentin Counseling: I discussed with the patient the risks of gabapentin including but not limited to dizziness, somnolence, fatigue and ataxia. Cyclosporine Counseling:  I discussed with the patient the risks of cyclosporine including but not limited to hypertension, gingival hyperplasia,myelosuppression, immunosuppression, liver damage, kidney damage, neurotoxicity, lymphoma, and serious infections. The patient understands that monitoring is required including baseline blood pressure, CBC, CMP, lipid panel and uric acid, and then 1-2 times monthly CMP and blood pressure. Sarecycline Counseling: Patient advised regarding possible photosensitivity and discoloration of the teeth, skin, lips, tongue and gums.  Patient instructed to avoid sunlight, if possible.  When exposed to sunlight, patients should wear protective clothing, sunglasses, and sunscreen.  The patient was instructed to call the office immediately if the following severe adverse effects occur:  hearing changes, easy bruising/bleeding, severe headache, or vision changes.  The patient verbalized understanding of the proper use and possible adverse effects of sarecycline.  All of the patient's questions and concerns were addressed. Include Pregnancy/Lactation Warning?: No Topical Retinoid Pregnancy And Lactation Text: This medication is Pregnancy Category C. It is unknown if this medication is excreted in breast milk. Hydroxyzine Counseling: Patient advised that the medication is sedating and not to drive a car after taking this medication.  Patient informed of potential adverse effects including but not limited to dry mouth, urinary retention, and blurry vision.  The patient verbalized understanding of the proper use and possible adverse effects of hydroxyzine.  All of the patient's questions and concerns were addressed. Doxepin Pregnancy And Lactation Text: This medication is Pregnancy Category C and it isn't known if it is safe during pregnancy. It is also excreted in breast milk and breast feeding isn't recommended. Terbinafine Counseling: Patient counseling regarding adverse effects of terbinafine including but not limited to headache, diarrhea, rash, upset stomach, liver function test abnormalities, itching, taste/smell disturbance, nausea, abdominal pain, and flatulence.  There is a rare possibility of liver failure that can occur when taking terbinafine.  The patient understands that a baseline LFT and kidney function test may be required. The patient verbalized understanding of the proper use and possible adverse effects of terbinafine.  All of the patient's questions and concerns were addressed. Eucrisa Counseling: Patient may experience a mild burning sensation during topical application. Eucrisa is not approved in children less than 2 years of age. Isotretinoin Counseling: Patient should get monthly blood tests, not donate blood, not drive at night if vision affected, not share medication, and not undergo elective surgery for 6 months after tx completed. Side effects reviewed, pt to contact office should one occur. Eucrisa Pregnancy And Lactation Text: This medication has not been assigned a Pregnancy Risk Category but animal studies failed to show danger with the topical medication. It is unknown if the medication is excreted in breast milk. Spironolactone Counseling: Patient advised regarding risks of diarrhea, abdominal pain, hyperkalemia, birth defects (for female patients), liver toxicity and renal toxicity. The patient may need blood work to monitor liver and kidney function and potassium levels while on therapy. The patient verbalized understanding of the proper use and possible adverse effects of spironolactone.  All of the patient's questions and concerns were addressed. Arava Counseling:  Patient counseled regarding adverse effects of Arava including but not limited to nausea, vomiting, abnormalities in liver function tests. Patients may develop mouth sores, rash, diarrhea, and abnormalities in blood counts. The patient understands that monitoring is required including LFTs and blood counts.  There is a rare possibility of scarring of the liver and lung problems that can occur when taking methotrexate. Persistent nausea, loss of appetite, pale stools, dark urine, cough, and shortness of breath should be reported immediately. Patient advised to discontinue Arava treatment and consult with a physician prior to attempting conception. The patient will have to undergo a treatment to eliminate Arava from the body prior to conception. Azathioprine Pregnancy And Lactation Text: This medication is Pregnancy Category D and isn't considered safe during pregnancy. It is unknown if this medication is excreted in breast milk. Terbinafine Pregnancy And Lactation Text: This medication is Pregnancy Category B and is considered safe during pregnancy. It is also excreted in breast milk and breast feeding isn't recommended. Odomzo Counseling- I discussed with the patient the risks of Odomzo including but not limited to nausea, vomiting, diarrhea, constipation, weight loss, changes in the sense of taste, decreased appetite, muscle spasms, and hair loss.  The patient verbalized understanding of the proper use and possible adverse effects of Odomzo.  All of the patient's questions and concerns were addressed. Topical Sulfur Applications Counseling: Topical Sulfur Counseling: Patient counseled that this medication may cause skin irritation or allergic reactions.  In the event of skin irritation, the patient was advised to reduce the amount of the drug applied or use it less frequently.   The patient verbalized understanding of the proper use and possible adverse effects of topical sulfur application.  All of the patient's questions and concerns were addressed. Ivermectin Counseling:  Patient instructed to take medication on an empty stomach with a full glass of water.  Patient informed of potential adverse effects including but not limited to nausea, diarrhea, dizziness, itching, and swelling of the extremities or lymph nodes.  The patient verbalized understanding of the proper use and possible adverse effects of ivermectin.  All of the patient's questions and concerns were addressed. Rituxan Counseling:  I discussed with the patient the risks of Rituxan infusions. Side effects can include infusion reactions, severe drug rashes including mucocutaneous reactions, reactivation of latent hepatitis and other infections and rarely progressive multifocal leukoencephalopathy.  All of the patient's questions and concerns were addressed. Rifampin Pregnancy And Lactation Text: This medication is Pregnancy Category C and it isn't know if it is safe during pregnancy. It is also excreted in breast milk and should not be used if you are breast feeding. Valtrex Counseling: I discussed with the patient the risks of valacyclovir including but not limited to kidney damage, nausea, vomiting and severe allergy.  The patient understands that if the infection seems to be worsening or is not improving, they are to call. Bactrim Counseling:  I discussed with the patient the risks of sulfa antibiotics including but not limited to GI upset, allergic reaction, drug rash, diarrhea, dizziness, photosensitivity, and yeast infections.  Rarely, more serious reactions can occur including but not limited to aplastic anemia, agranulocytosis, methemoglobinemia, blood dyscrasias, liver or kidney failure, lung infiltrates or desquamative/blistering drug rashes. Minocycline Pregnancy And Lactation Text: This medication is Pregnancy Category D and not consider safe during pregnancy. It is also excreted in breast milk. Otezla Counseling: The side effects of Otezla were discussed with the patient, including but not limited to worsening or new depression, weight loss, diarrhea, nausea, upper respiratory tract infection, and headache. Patient instructed to call the office should any adverse effect occur.  The patient verbalized understanding of the proper use and possible adverse effects of Otezla.  All the patient's questions and concerns were addressed. Glycopyrrolate Pregnancy And Lactation Text: This medication is Pregnancy Category B and is considered safe during pregnancy. It is unknown if it is excreted breast milk. Doxycycline Pregnancy And Lactation Text: This medication is Pregnancy Category D and not consider safe during pregnancy. It is also excreted in breast milk but is considered safe for shorter treatment courses. Enbrel Counseling:  I discussed with the patient the risks of etanercept including but not limited to myelosuppression, immunosuppression, autoimmune hepatitis, demyelinating diseases, lymphoma, and infections.  The patient understands that monitoring is required including a PPD at baseline and must alert us or the primary physician if symptoms of infection or other concerning signs are noted. Xolair Counseling:  Patient informed of potential adverse effects including but not limited to fever, muscle aches, rash and allergic reactions.  The patient verbalized understanding of the proper use and possible adverse effects of Xolair.  All of the patient's questions and concerns were addressed. Infliximab Pregnancy And Lactation Text: This medication is Pregnancy Category B and is considered safe during pregnancy. It is unknown if this medication is excreted in breast milk. Cephalexin Counseling: I counseled the patient regarding use of cephalexin as an antibiotic for prophylactic and/or therapeutic purposes. Cephalexin (commonly prescribed under brand name Keflex) is a cephalosporin antibiotic which is active against numerous classes of bacteria, including most skin bacteria. Side effects may include nausea, diarrhea, gastrointestinal upset, rash, hives, yeast infections, and in rare cases, hepatitis, kidney disease, seizures, fever, confusion, neurologic symptoms, and others. Patients with severe allergies to penicillin medications are cautioned that there is about a 10% incidence of cross-reactivity with cephalosporins. When possible, patients with penicillin allergies should use alternatives to cephalosporins for antibiotic therapy. Dapsone Pregnancy And Lactation Text: This medication is Pregnancy Category C and is not considered safe during pregnancy or breast feeding. Azithromycin Pregnancy And Lactation Text: This medication is considered safe during pregnancy and is also secreted in breast milk. Protopic Pregnancy And Lactation Text: This medication is Pregnancy Category C. It is unknown if this medication is excreted in breast milk when applied topically. Stelara Counseling:  I discussed with the patient the risks of ustekinumab including but not limited to immunosuppression, malignancy, posterior leukoencephalopathy syndrome, and serious infections.  The patient understands that monitoring is required including a PPD at baseline and must alert us or the primary physician if symptoms of infection or other concerning signs are noted. Clofazimine Pregnancy And Lactation Text: This medication is Pregnancy Category C and isn't considered safe during pregnancy. It is excreted in breast milk. Otezla Pregnancy And Lactation Text: This medication is Pregnancy Category C and it isn't known if it is safe during pregnancy. It is unknown if it is excreted in breast milk. Detail Level: Zone Xeljanz Counseling: I discussed with the patient the risks of Xeljanz therapy including increased risk of infection, liver issues, headache, diarrhea, or cold symptoms. Live vaccines should be avoided. They were instructed to call if they have any problems. Humira Counseling:  I discussed with the patient the risks of adalimumab including but not limited to myelosuppression, immunosuppression, autoimmune hepatitis, demyelinating diseases, lymphoma, and serious infections.  The patient understands that monitoring is required including a PPD at baseline and must alert us or the primary physician if symptoms of infection or other concerning signs are noted. Carac Counseling:  I discussed with the patient the risks of Carac including but not limited to erythema, scaling, itching, weeping, crusting, and pain. Xelnorbertoz Pregnancy And Lactation Text: This medication is Pregnancy Category D and is not considered safe during pregnancy.  The risk during breast feeding is also uncertain. Imiquimod Counseling:  I discussed with the patient the risks of imiquimod including but not limited to erythema, scaling, itching, weeping, crusting, and pain.  Patient understands that the inflammatory response to imiquimod is variable from person to person and was educated regarded proper titration schedule.  If flu-like symptoms develop, patient knows to discontinue the medication and contact us. Itraconazole Counseling:  I discussed with the patient the risks of itraconazole including but not limited to liver damage, nausea/vomiting, neuropathy, and severe allergy.  The patient understands that this medication is best absorbed when taken with acidic beverages such as non-diet cola or ginger ale.  The patient understands that monitoring is required including baseline LFTs and repeat LFTs at intervals.  The patient understands that they are to contact us or the primary physician if concerning signs are noted. Cephalexin Pregnancy And Lactation Text: This medication is Pregnancy Category B and considered safe during pregnancy.  It is also excreted in breast milk but can be used safely for shorter doses. Ivermectin Pregnancy And Lactation Text: This medication is Pregnancy Category C and it isn't known if it is safe during pregnancy. It is also excreted in breast milk. Benzoyl Peroxide Pregnancy And Lactation Text: This medication is Pregnancy Category C. It is unknown if benzoyl peroxide is excreted in breast milk. Cellcept Counseling:  I discussed with the patient the risks of mycophenolate mofetil including but not limited to infection/immunosuppression, GI upset, hypokalemia, hypercholesterolemia, bone marrow suppression, lymphoproliferative disorders, malignancy, GI ulceration/bleed/perforation, colitis, interstitial lung disease, kidney failure, progressive multifocal leukoencephalopathy, and birth defects.  The patient understands that monitoring is required including a baseline creatinine and regular CBC testing. In addition, patient must alert us immediately if symptoms of infection or other concerning signs are noted. Cimetidine Counseling:  I discussed with the patient the risks of Cimetidine including but not limited to gynecomastia, headache, diarrhea, nausea, drowsiness, arrhythmias, pancreatitis, skin rashes, psychosis, bone marrow suppression and kidney toxicity. Oxybutynin Counseling:  I discussed with the patient the risks of oxybutynin including but not limited to skin rash, drowsiness, dry mouth, difficulty urinating, and blurred vision. Topical Sulfur Applications Pregnancy And Lactation Text: This medication is Pregnancy Category C and has an unknown safety profile during pregnancy. It is unknown if this topical medication is excreted in breast milk. Hydroxychloroquine Pregnancy And Lactation Text: This medication has been shown to cause fetal harm but it isn't assigned a Pregnancy Risk Category. There are small amounts excreted in breast milk. Rifampin Counseling: I discussed with the patient the risks of rifampin including but not limited to liver damage, kidney damage, red-orange body fluids, nausea/vomiting and severe allergy. Acitretin Pregnancy And Lactation Text: This medication is Pregnancy Category X and should not be given to women who are pregnant or may become pregnant in the future. This medication is excreted in breast milk. Colchicine Counseling:  Patient counseled regarding adverse effects including but not limited to stomach upset (nausea, vomiting, stomach pain, or diarrhea).  Patient instructed to limit alcohol consumption while taking this medication.  Colchicine may reduce blood counts especially with prolonged use.  The patient understands that monitoring of kidney function and blood counts may be required, especially at baseline. The patient verbalized understanding of the proper use and possible adverse effects of colchicine.  All of the patient's questions and concerns were addressed. Clofazimine Counseling:  I discussed with the patient the risks of clofazimine including but not limited to skin and eye pigmentation, liver damage, nausea/vomiting, gastrointestinal bleeding and allergy. 5-Fu Counseling: 5-Fluorouracil Counseling:  I discussed with the patient the risks of 5-fluorouracil including but not limited to erythema, scaling, itching, weeping, crusting, and pain. Taltz Counseling: I discussed with the patient the risks of ixekizumab including but not limited to immunosuppression, serious infections, worsening of inflammatory bowel disease and drug reactions.  The patient understands that monitoring is required including a PPD at baseline and must alert us or the primary physician if symptoms of infection or other concerning signs are noted. High Dose Vitamin A Counseling: Side effects reviewed, pt to contact office should one occur. Dapsone Counseling: I discussed with the patient the risks of dapsone including but not limited to hemolytic anemia, agranulocytosis, rashes, methemoglobinemia, kidney failure, peripheral neuropathy, headaches, GI upset, and liver toxicity.  Patients who start dapsone require monitoring including baseline LFTs and weekly CBCs for the first month, then every month thereafter.  The patient verbalized understanding of the proper use and possible adverse effects of dapsone.  All of the patient's questions and concerns were addressed. Sski Pregnancy And Lactation Text: This medication is Pregnancy Category D and isn't considered safe during pregnancy. It is excreted in breast milk. Spironolactone Pregnancy And Lactation Text: This medication can cause feminization of the male fetus and should be avoided during pregnancy. The active metabolite is also found in breast milk. Siliq Counseling:  I discussed with the patient the risks of Siliq including but not limited to new or worsening depression, suicidal thoughts and behavior, immunosuppression, malignancy, posterior leukoencephalopathy syndrome, and serious infections.  The patient understands that monitoring is required including a PPD at baseline and must alert us or the primary physician if symptoms of infection or other concerning signs are noted. There is also a special program designed to monitor depression which is required with Siliq. Methotrexate Pregnancy And Lactation Text: This medication is Pregnancy Category X and is known to cause fetal harm. This medication is excreted in breast milk. Dupixent Pregnancy And Lactation Text: This medication likely crosses the placenta but the risk for the fetus is uncertain. This medication is excreted in breast milk. Ketoconazole Pregnancy And Lactation Text: This medication is Pregnancy Category C and it isn't know if it is safe during pregnancy. It is also excreted in breast milk and breast feeding isn't recommended. Bexarotene Pregnancy And Lactation Text: This medication is Pregnancy Category X and should not be given to women who are pregnant or may become pregnant. This medication should not be used if you are breast feeding. Topical Retinoid counseling:  Patient advised to apply a pea-sized amount only at bedtime and wait 30 minutes after washing their face before applying.  If too drying, patient may add a non-comedogenic moisturizer. The patient verbalized understanding of the proper use and possible adverse effects of retinoids.  All of the patient's questions and concerns were addressed. Bexarotene Counseling:  I discussed with the patient the risks of bexarotene including but not limited to hair loss, dry lips/skin/eyes, liver abnormalities, hyperlipidemia, pancreatitis, depression/suicidal ideation, photosensitivity, drug rash/allergic reactions, hypothyroidism, anemia, leukopenia, infection, cataracts, and teratogenicity.  Patient understands that they will need regular blood tests to check lipid profile, liver function tests, white blood cell count, thyroid function tests and pregnancy test if applicable. Dupixent Counseling: I discussed with the patient the risks of dupilumab including but not limited to eye infection and irritation, cold sores, injection site reactions, worsening of asthma, allergic reactions and increased risk of parasitic infection.  Live vaccines should be avoided while taking dupilumab. Dupilumab will also interact with certain medications such as warfarin and cyclosporine. The patient understands that monitoring is required and they must alert us or the primary physician if symptoms of infection or other concerning signs are noted. Metronidazole Counseling:  I discussed with the patient the risks of metronidazole including but not limited to seizures, nausea/vomiting, a metallic taste in the mouth, nausea/vomiting and severe allergy. Griseofulvin Counseling:  I discussed with the patient the risks of griseofulvin including but not limited to photosensitivity, cytopenia, liver damage, nausea/vomiting and severe allergy.  The patient understands that this medication is best absorbed when taken with a fatty meal (e.g., ice cream or french fries). Clindamycin Pregnancy And Lactation Text: This medication can be used in pregnancy if certain situations. Clindamycin is also present in breast milk. Glycopyrrolate Counseling:  I discussed with the patient the risks of glycopyrrolate including but not limited to skin rash, drowsiness, dry mouth, difficulty urinating, and blurred vision. Solaraze Pregnancy And Lactation Text: This medication is Pregnancy Category B and is considered safe. There is some data to suggest avoiding during the third trimester. It is unknown if this medication is excreted in breast milk. Birth Control Pills Counseling: Birth Control Pill Counseling: I discussed with the patient the potential side effects of OCPs including but not limited to increased risk of stroke, heart attack, thrombophlebitis, deep venous thrombosis, hepatic adenomas, breast changes, GI upset, headaches, and depression.  The patient verbalized understanding of the proper use and possible adverse effects of OCPs. All of the patient's questions and concerns were addressed. Minocycline Counseling: Patient advised regarding possible photosensitivity and discoloration of the teeth, skin, lips, tongue and gums.  Patient instructed to avoid sunlight, if possible.  When exposed to sunlight, patients should wear protective clothing, sunglasses, and sunscreen.  The patient was instructed to call the office immediately if the following severe adverse effects occur:  hearing changes, easy bruising/bleeding, severe headache, or vision changes.  The patient verbalized understanding of the proper use and possible adverse effects of minocycline.  All of the patient's questions and concerns were addressed. Picato Counseling:  I discussed with the patient the risks of Picato including but not limited to erythema, scaling, itching, weeping, crusting, and pain. Drysol Pregnancy And Lactation Text: This medication is considered safe during pregnancy and breast feeding. Protopic Counseling: Patient may experience a mild burning sensation during topical application. Protopic is not approved in children less than 2 years of age. There have been case reports of hematologic and skin malignancies in patients using topical calcineurin inhibitors although causality is questionable. SSKI Counseling:  I discussed with the patient the risks of SSKI including but not limited to thyroid abnormalities, metallic taste, GI upset, fever, headache, acne, arthralgias, paraesthesias, lymphadenopathy, easy bleeding, arrhythmias, and allergic reaction. Infliximab Counseling:  I discussed with the patient the risks of infliximab including but not limited to myelosuppression, immunosuppression, autoimmune hepatitis, demyelinating diseases, lymphoma, and serious infections.  The patient understands that monitoring is required including a PPD at baseline and must alert us or the primary physician if symptoms of infection or other concerning signs are noted. Elidel Counseling: Patient may experience a mild burning sensation during topical application. Elidel is not approved in children less than 2 years of age. There have been case reports of hematologic and skin malignancies in patients using topical calcineurin inhibitors although causality is questionable. Acitretin Counseling:  I discussed with the patient the risks of acitretin including but not limited to hair loss, dry lips/skin/eyes, liver damage, hyperlipidemia, depression/suicidal ideation, photosensitivity.  Serious rare side effects can include but are not limited to pancreatitis, pseudotumor cerebri, bony changes, clot formation/stroke/heart attack.  Patient understands that alcohol is contraindicated since it can result in liver toxicity and significantly prolong the elimination of the drug by many years. Tazorac Pregnancy And Lactation Text: This medication is not safe during pregnancy. It is unknown if this medication is excreted in breast milk. Drysol Counseling:  I discussed with the patient the risks of drysol/aluminum chloride including but not limited to skin rash, itching, irritation, burning. Metronidazole Pregnancy And Lactation Text: This medication is Pregnancy Category B and considered safe during pregnancy.  It is also excreted in breast milk. Thalidomide Counseling: I discussed with the patient the risks of thalidomide including but not limited to birth defects, anxiety, weakness, chest pain, dizziness, cough and severe allergy. Solaraze Counseling:  I discussed with the patient the risks of Solaraze including but not limited to erythema, scaling, itching, weeping, crusting, and pain. Albendazole Counseling:  I discussed with the patient the risks of albendazole including but not limited to cytopenia, kidney damage, nausea/vomiting and severe allergy.  The patient understands that this medication is being used in an off-label manner. Benzoyl Peroxide Counseling: Patient counseled that medicine may cause skin irritation and bleach clothing.  In the event of skin irritation, the patient was advised to reduce the amount of the drug applied or use it less frequently.   The patient verbalized understanding of the proper use and possible adverse effects of benzoyl peroxide.  All of the patient's questions and concerns were addressed. Erivedge Counseling- I discussed with the patient the risks of Erivedge including but not limited to nausea, vomiting, diarrhea, constipation, weight loss, changes in the sense of taste, decreased appetite, muscle spasms, and hair loss.  The patient verbalized understanding of the proper use and possible adverse effects of Erivedge.  All of the patient's questions and concerns were addressed. Zyclara Counseling:  I discussed with the patient the risks of imiquimod including but not limited to erythema, scaling, itching, weeping, crusting, and pain.  Patient understands that the inflammatory response to imiquimod is variable from person to person and was educated regarded proper titration schedule.  If flu-like symptoms develop, patient knows to discontinue the medication and contact us. Cimzia Pregnancy And Lactation Text: This medication crosses the placenta but can be considered safe in certain situations. Cimzia may be excreted in breast milk. Skyrizi Counseling: I discussed with the patient the risks of risankizumab-rzaa including but not limited to immunosuppression, and serious infections.  The patient understands that monitoring is required including a PPD at baseline and must alert us or the primary physician if symptoms of infection or other concerning signs are noted. Tazorac Counseling:  Patient advised that medication is irritating and drying.  Patient may need to apply sparingly and wash off after an hour before eventually leaving it on overnight.  The patient verbalized understanding of the proper use and possible adverse effects of tazorac.  All of the patient's questions and concerns were addressed. Fluconazole Counseling:  Patient counseled regarding adverse effects of fluconazole including but not limited to headache, diarrhea, nausea, upset stomach, liver function test abnormalities, taste disturbance, and stomach pain.  There is a rare possibility of liver failure that can occur when taking fluconazole.  The patient understands that monitoring of LFTs and kidney function test may be required, especially at baseline. The patient verbalized understanding of the proper use and possible adverse effects of fluconazole.  All of the patient's questions and concerns were addressed. Doxycycline Counseling:  Patient counseled regarding possible photosensitivity and increased risk for sunburn.  Patient instructed to avoid sunlight, if possible.  When exposed to sunlight, patients should wear protective clothing, sunglasses, and sunscreen.  The patient was instructed to call the office immediately if the following severe adverse effects occur:  hearing changes, easy bruising/bleeding, severe headache, or vision changes.  The patient verbalized understanding of the proper use and possible adverse effects of doxycycline.  All of the patient's questions and concerns were addressed. Isotretinoin Pregnancy And Lactation Text: This medication is Pregnancy Category X and is considered extremely dangerous during pregnancy. It is unknown if it is excreted in breast milk. High Dose Vitamin A Pregnancy And Lactation Text: High dose vitamin A therapy is contraindicated during pregnancy and breast feeding. Hydroquinone Counseling:  Patient advised that medication may result in skin irritation, lightening (hypopigmentation), dryness, and burning.  In the event of skin irritation, the patient was advised to reduce the amount of the drug applied or use it less frequently.  Rarely, spots that are treated with hydroquinone can become darker (pseudoochronosis).  Should this occur, patient instructed to stop medication and call the office. The patient verbalized understanding of the proper use and possible adverse effects of hydroquinone.  All of the patient's questions and concerns were addressed. Hydroxychloroquine Counseling:  I discussed with the patient that a baseline ophthalmologic exam is needed at the start of therapy and every year thereafter while on therapy. A CBC may also be warranted for monitoring.  The side effects of this medication were discussed with the patient, including but not limited to agranulocytosis, aplastic anemia, seizures, rashes, retinopathy, and liver toxicity. Patient instructed to call the office should any adverse effect occur.  The patient verbalized understanding of the proper use and possible adverse effects of Plaquenil.  All the patient's questions and concerns were addressed. Nsaids Pregnancy And Lactation Text: These medications are considered safe up to 30 weeks gestation. It is excreted in breast milk. Birth Control Pills Pregnancy And Lactation Text: This medication should be avoided if pregnant and for the first 30 days post-partum. Ketoconazole Counseling:   Patient counseled regarding improving absorption with orange juice.  Adverse effects include but are not limited to breast enlargement, headache, diarrhea, nausea, upset stomach, liver function test abnormalities, taste disturbance, and stomach pain.  There is a rare possibility of liver failure that can occur when taking ketoconazole. The patient understands that monitoring of LFTs may be required, especially at baseline. The patient verbalized understanding of the proper use and possible adverse effects of ketoconazole.  All of the patient's questions and concerns were addressed. Tremfya Counseling: I discussed with the patient the risks of guselkumab including but not limited to immunosuppression, serious infections, worsening of inflammatory bowel disease and drug reactions.  The patient understands that monitoring is required including a PPD at baseline and must alert us or the primary physician if symptoms of infection or other concerning signs are noted. Rituxan Pregnancy And Lactation Text: This medication is Pregnancy Category C and it isn't know if it is safe during pregnancy. It is unknown if this medication is excreted in breast milk but similar antibodies are known to be excreted. Minoxidil Counseling: Minoxidil is a topical medication which can increase blood flow where it is applied. It is uncertain how this medication increases hair growth. Side effects are uncommon and include stinging and allergic reactions. Prednisone Counseling:  I discussed with the patient the risks of prolonged use of prednisone including but not limited to weight gain, insomnia, osteoporosis, mood changes, diabetes, susceptibility to infection, glaucoma and high blood pressure.  In cases where prednisone use is prolonged, patients should be monitored with blood pressure checks, serum glucose levels and an eye exam.  Additionally, the patient may need to be placed on GI prophylaxis, PCP prophylaxis, and calcium and vitamin D supplementation and/or a bisphosphonate.  The patient verbalized understanding of the proper use and the possible adverse effects of prednisone.  All of the patient's questions and concerns were addressed. Cyclophosphamide Counseling:  I discussed with the patient the risks of cyclophosphamide including but not limited to hair loss, hormonal abnormalities, decreased fertility, abdominal pain, diarrhea, nausea and vomiting, bone marrow suppression and infection. The patient understands that monitoring is required while taking this medication. Tetracycline Counseling: Patient counseled regarding possible photosensitivity and increased risk for sunburn.  Patient instructed to avoid sunlight, if possible.  When exposed to sunlight, patients should wear protective clothing, sunglasses, and sunscreen.  The patient was instructed to call the office immediately if the following severe adverse effects occur:  hearing changes, easy bruising/bleeding, severe headache, or vision changes.  The patient verbalized understanding of the proper use and possible adverse effects of tetracycline.  All of the patient's questions and concerns were addressed. Patient understands to avoid pregnancy while on therapy due to potential birth defects. Erythromycin Counseling:  I discussed with the patient the risks of erythromycin including but not limited to GI upset, allergic reaction, drug rash, diarrhea, increase in liver enzymes, and yeast infections. Doxepin Counseling:  Patient advised that the medication is sedating and not to drive a car after taking this medication. Patient informed of potential adverse effects including but not limited to dry mouth, urinary retention, and blurry vision.  The patient verbalized understanding of the proper use and possible adverse effects of doxepin.  All of the patient's questions and concerns were addressed. Bactrim Pregnancy And Lactation Text: This medication is Pregnancy Category D and is known to cause fetal risk.  It is also excreted in breast milk. Erythromycin Pregnancy And Lactation Text: This medication is Pregnancy Category B and is considered safe during pregnancy. It is also excreted in breast milk. Azathioprine Counseling:  I discussed with the patient the risks of azathioprine including but not limited to myelosuppression, immunosuppression, hepatotoxicity, lymphoma, and infections.  The patient understands that monitoring is required including baseline LFTs, Creatinine, possible TPMP genotyping and weekly CBCs for the first month and then every 2 weeks thereafter.  The patient verbalized understanding of the proper use and possible adverse effects of azathioprine.  All of the patient's questions and concerns were addressed.

## 2023-03-22 RX ORDER — CELECOXIB 200 MG/1
200 CAPSULE ORAL 2 TIMES DAILY PRN
Qty: 60 CAPSULE | Refills: 0 | Status: SHIPPED | OUTPATIENT
Start: 2023-03-22

## 2023-03-22 RX ORDER — CELECOXIB 200 MG/1
200 CAPSULE ORAL 2 TIMES DAILY PRN
Qty: 60 CAPSULE | Refills: 0 | OUTPATIENT
Start: 2023-03-22

## 2023-04-03 ENCOUNTER — OFFICE VISIT (OUTPATIENT)
Dept: PAIN CLINIC | Facility: CLINIC | Age: 58
End: 2023-04-03
Payer: COMMERCIAL

## 2023-04-03 VITALS — OXYGEN SATURATION: 99 % | SYSTOLIC BLOOD PRESSURE: 124 MMHG | DIASTOLIC BLOOD PRESSURE: 80 MMHG | HEART RATE: 78 BPM

## 2023-04-03 DIAGNOSIS — M51.36 DDD (DEGENERATIVE DISC DISEASE), LUMBAR: Primary | ICD-10-CM

## 2023-04-03 PROCEDURE — 99214 OFFICE O/P EST MOD 30 MIN: CPT | Performed by: ANESTHESIOLOGY

## 2023-04-03 PROCEDURE — 3074F SYST BP LT 130 MM HG: CPT | Performed by: ANESTHESIOLOGY

## 2023-04-03 PROCEDURE — 3079F DIAST BP 80-89 MM HG: CPT | Performed by: ANESTHESIOLOGY

## 2023-04-03 NOTE — PROGRESS NOTES
Last procedure: RFA Bilat Lumbar  Date: 3/16/2023  Percentage of relief obtained: 90 %  Duration of relief: sustained    Current Pain Score:3

## 2023-04-04 ENCOUNTER — TELEPHONE (OUTPATIENT)
Dept: INTERNAL MEDICINE CLINIC | Facility: CLINIC | Age: 58
End: 2023-04-04

## 2023-04-04 NOTE — TELEPHONE ENCOUNTER
Her last STD testing was only chlamydia and gonorrhea on pap in Nov 2021. My recommendation is for full STD testing including but not limited to HIV and hepatitis. Ov to discuss further.
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
## 2023-04-04 NOTE — TELEPHONE ENCOUNTER
Spoke to patient. Pt was at [address] [place] [address] hoping to get test. She is very anxious and asking for appt today. Advised could schedule at soonest tomorrow. Used triage spot due to anxiousness.      Future Appointments   Date Time Provider Ester Mary   4/5/2023 10:40 AM URBANO Okeefe [facility]   5/3/2023  8:00 AM Amber Sanchez MD [facility]
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
## 2023-04-05 ENCOUNTER — LAB ENCOUNTER (OUTPATIENT)
Dept: LAB | Age: 58
End: 2023-04-05
Attending: NURSE PRACTITIONER
Payer: COMMERCIAL

## 2023-04-05 ENCOUNTER — OFFICE VISIT (OUTPATIENT)
Dept: INTERNAL MEDICINE CLINIC | Facility: CLINIC | Age: 58
End: 2023-04-05
Payer: COMMERCIAL

## 2023-04-05 VITALS
HEIGHT: 64 IN | RESPIRATION RATE: 18 BRPM | HEART RATE: 88 BPM | BODY MASS INDEX: 26.29 KG/M2 | DIASTOLIC BLOOD PRESSURE: 70 MMHG | TEMPERATURE: 98 F | WEIGHT: 154 LBS | SYSTOLIC BLOOD PRESSURE: 128 MMHG

## 2023-04-05 DIAGNOSIS — M25.551 HIP PAIN, RIGHT: ICD-10-CM

## 2023-04-05 DIAGNOSIS — Z20.2 EXPOSURE TO STD: ICD-10-CM

## 2023-04-05 DIAGNOSIS — L98.9 FOOT LESION: ICD-10-CM

## 2023-04-05 DIAGNOSIS — Z20.2 EXPOSURE TO STD: Primary | ICD-10-CM

## 2023-04-05 LAB
ALBUMIN SERPL-MCNC: 4.2 G/DL (ref 3.4–5)
ALBUMIN/GLOB SERPL: 1.4 {RATIO} (ref 1–2)
ALP LIVER SERPL-CCNC: 77 U/L
ALT SERPL-CCNC: 28 U/L
ANION GAP SERPL CALC-SCNC: 8 MMOL/L (ref 0–18)
AST SERPL-CCNC: 20 U/L (ref 15–37)
BILIRUB SERPL-MCNC: 0.4 MG/DL (ref 0.1–2)
BUN BLD-MCNC: 14 MG/DL (ref 7–18)
CALCIUM BLD-MCNC: 9.4 MG/DL (ref 8.5–10.1)
CHLORIDE SERPL-SCNC: 103 MMOL/L (ref 98–112)
CO2 SERPL-SCNC: 26 MMOL/L (ref 21–32)
CREAT BLD-MCNC: 0.87 MG/DL
FASTING STATUS PATIENT QL REPORTED: YES
GFR SERPLBLD BASED ON 1.73 SQ M-ARVRAT: 78 ML/MIN/1.73M2 (ref 60–?)
GLOBULIN PLAS-MCNC: 3 G/DL (ref 2.8–4.4)
GLUCOSE BLD-MCNC: 89 MG/DL (ref 70–99)
HBV SURFACE AB SER QL: NONREACTIVE
HBV SURFACE AB SERPL IA-ACNC: <3.1 MIU/ML
HBV SURFACE AG SER-ACNC: <0.1 [IU]/L
HBV SURFACE AG SERPL QL IA: NONREACTIVE
HCV AB SERPL QL IA: NONREACTIVE
OSMOLALITY SERPL CALC.SUM OF ELEC: 284 MOSM/KG (ref 275–295)
POTASSIUM SERPL-SCNC: 3.9 MMOL/L (ref 3.5–5.1)
PROT SERPL-MCNC: 7.2 G/DL (ref 6.4–8.2)
SODIUM SERPL-SCNC: 137 MMOL/L (ref 136–145)
T PALLIDUM AB SER QL IA: NONREACTIVE

## 2023-04-05 PROCEDURE — 87591 N.GONORRHOEAE DNA AMP PROB: CPT | Performed by: NURSE PRACTITIONER

## 2023-04-05 PROCEDURE — 90471 IMMUNIZATION ADMIN: CPT | Performed by: NURSE PRACTITIONER

## 2023-04-05 PROCEDURE — 3008F BODY MASS INDEX DOCD: CPT | Performed by: NURSE PRACTITIONER

## 2023-04-05 PROCEDURE — 87340 HEPATITIS B SURFACE AG IA: CPT

## 2023-04-05 PROCEDURE — 86706 HEP B SURFACE ANTIBODY: CPT

## 2023-04-05 PROCEDURE — 90746 HEPB VACCINE 3 DOSE ADULT IM: CPT | Performed by: NURSE PRACTITIONER

## 2023-04-05 PROCEDURE — 80053 COMPREHEN METABOLIC PANEL: CPT

## 2023-04-05 PROCEDURE — 99214 OFFICE O/P EST MOD 30 MIN: CPT | Performed by: NURSE PRACTITIONER

## 2023-04-05 PROCEDURE — 3078F DIAST BP <80 MM HG: CPT | Performed by: NURSE PRACTITIONER

## 2023-04-05 PROCEDURE — 86803 HEPATITIS C AB TEST: CPT

## 2023-04-05 PROCEDURE — 3074F SYST BP LT 130 MM HG: CPT | Performed by: NURSE PRACTITIONER

## 2023-04-05 PROCEDURE — 87491 CHLMYD TRACH DNA AMP PROBE: CPT | Performed by: NURSE PRACTITIONER

## 2023-04-05 PROCEDURE — 87389 HIV-1 AG W/HIV-1&-2 AB AG IA: CPT

## 2023-04-05 PROCEDURE — 36415 COLL VENOUS BLD VENIPUNCTURE: CPT

## 2023-04-05 PROCEDURE — 86780 TREPONEMA PALLIDUM: CPT

## 2023-04-05 RX ORDER — TRIAMCINOLONE ACETONIDE 1 MG/G
CREAM TOPICAL 2 TIMES DAILY PRN
Qty: 30 G | Refills: 0 | Status: SHIPPED | OUTPATIENT
Start: 2023-04-05

## 2023-04-06 LAB
C TRACH DNA SPEC QL NAA+PROBE: NEGATIVE
N GONORRHOEA DNA SPEC QL NAA+PROBE: NEGATIVE

## 2023-04-16 DIAGNOSIS — M16.11 PRIMARY OSTEOARTHRITIS OF RIGHT HIP: ICD-10-CM

## 2023-04-16 DIAGNOSIS — M16.12 PRIMARY OSTEOARTHRITIS OF LEFT HIP: ICD-10-CM

## 2023-04-17 ENCOUNTER — PATIENT MESSAGE (OUTPATIENT)
Dept: ORTHOPEDICS CLINIC | Facility: CLINIC | Age: 58
End: 2023-04-17

## 2023-04-17 RX ORDER — CELECOXIB 200 MG/1
200 CAPSULE ORAL 2 TIMES DAILY PRN
Qty: 60 CAPSULE | Refills: 0 | OUTPATIENT
Start: 2023-04-17

## 2023-04-17 NOTE — TELEPHONE ENCOUNTER
Last OV: 12/28/23  Last refill date: 03/22/23     #/refills: 60/0  Upcoming appt:    Future Appointments   Date Time Provider Ester Evansi   5/3/2023  8:00 AM Jhonny Shay MD [facility]     4/5/23  eGFR-Cr  >=60 mL/min/1.73m2 78      BUN  7 - 18 mg/dL 14    Creatinine  0.55 - 1.02 mg/dL 0.87      Plan: Labs every 6 months is staying on NSAIDS

## 2023-04-18 RX ORDER — CELECOXIB 200 MG/1
CAPSULE ORAL
Qty: 60 CAPSULE | Refills: 0 | Status: SHIPPED | OUTPATIENT
Start: 2023-04-18

## 2023-05-03 ENCOUNTER — OFFICE VISIT (OUTPATIENT)
Dept: ORTHOPEDICS CLINIC | Facility: CLINIC | Age: 58
End: 2023-05-03
Payer: COMMERCIAL

## 2023-05-03 VITALS — BODY MASS INDEX: 26.46 KG/M2 | OXYGEN SATURATION: 99 % | HEIGHT: 64 IN | WEIGHT: 155 LBS | HEART RATE: 100 BPM

## 2023-05-03 DIAGNOSIS — M16.0 PRIMARY OSTEOARTHRITIS OF BOTH HIPS: Primary | ICD-10-CM

## 2023-05-03 PROCEDURE — 3008F BODY MASS INDEX DOCD: CPT | Performed by: ORTHOPAEDIC SURGERY

## 2023-05-03 PROCEDURE — 20611 DRAIN/INJ JOINT/BURSA W/US: CPT | Performed by: ORTHOPAEDIC SURGERY

## 2023-05-03 RX ORDER — TRIAMCINOLONE ACETONIDE 40 MG/ML
80 INJECTION, SUSPENSION INTRA-ARTICULAR; INTRAMUSCULAR ONCE
Status: COMPLETED | OUTPATIENT
Start: 2023-05-03 | End: 2023-05-03

## 2023-05-03 RX ADMIN — TRIAMCINOLONE ACETONIDE 80 MG: 40 INJECTION, SUSPENSION INTRA-ARTICULAR; INTRAMUSCULAR at 08:28:00

## 2023-05-03 NOTE — PROCEDURES
Has been getting relief from injections, RFA, Celebrex. After informed consent, the patient's right and left hips were marked, prepped with topical antiseptic, and locally anesthetized with skin refrigerant followed by injection of 1% lidocaine to create a skin wheal anteriorly at an insertion site a few fingerbreadths lateral to the femoral pulse, along the trajectory of the femoral neck. Next, the area was re-prepped with topical antiseptic, and ultrasound guidance was performed to direct a 20 gauge spinal needle into the hip joint at the junction of the femoral head and neck, after which a mixture of 1mL 40mg/mL Kenalog, 2mL 1% lidocaine and 2mL 0.5% marcaine was injected while visualizing the fluid under ultrasound. Confirmatory imaging was saved to the patient's chart. A band-aid was applied. The patient tolerated the procedure well. We will repeat hip and pelvis x-rays at her next visit in 3 months for injection.     Venkata Prince MD, [address] [facility]  Phone [phone]  Fax [phone]

## 2023-05-15 ENCOUNTER — PATIENT MESSAGE (OUTPATIENT)
Dept: INTERNAL MEDICINE CLINIC | Facility: CLINIC | Age: 58
End: 2023-05-15

## 2023-05-15 ENCOUNTER — TELEPHONE (OUTPATIENT)
Dept: INTERNAL MEDICINE CLINIC | Facility: CLINIC | Age: 58
End: 2023-05-15

## 2023-05-15 DIAGNOSIS — E78.5 DYSLIPIDEMIA: ICD-10-CM

## 2023-05-15 DIAGNOSIS — Z23 NEED FOR VACCINATION: Primary | ICD-10-CM

## 2023-05-15 NOTE — TELEPHONE ENCOUNTER
Pt scheduled for 2nd Hep B tomorrow, please place order.           Future Appointments   Date Time Provider Ester Mary   5/16/2023  2:30 PM [facility]   8/2/2023  8:20 AM Mushtaq Manriquez MD [facility]   10/5/2023  1:00 PM [facility]

## 2023-05-16 ENCOUNTER — NURSE ONLY (OUTPATIENT)
Dept: INTERNAL MEDICINE CLINIC | Facility: CLINIC | Age: 58
End: 2023-05-16
Payer: COMMERCIAL

## 2023-05-16 PROCEDURE — 90746 HEPB VACCINE 3 DOSE ADULT IM: CPT | Performed by: NURSE PRACTITIONER

## 2023-05-16 PROCEDURE — 90471 IMMUNIZATION ADMIN: CPT | Performed by: NURSE PRACTITIONER

## 2023-05-16 RX ORDER — ATORVASTATIN CALCIUM 10 MG/1
10 TABLET, FILM COATED ORAL DAILY
Qty: 90 TABLET | Refills: 2 | Status: SHIPPED | OUTPATIENT
Start: 2023-05-16 | End: 2023-08-14

## 2023-05-16 NOTE — TELEPHONE ENCOUNTER
From: Ashley Pride  To: URBANO Hurt  Sent: 5/15/2023 6:39 PM CDT  Subject: Cholesterol Meds    Carey Smith will be reaching out to you for approval on a 90 day refill of my cholesterol meds as it is cheaper. Can you please refill it that way for me?     Thank you,  Yamilex Figueroa

## 2023-06-02 ENCOUNTER — OFFICE VISIT (OUTPATIENT)
Dept: PAIN CLINIC | Facility: CLINIC | Age: 58
End: 2023-06-02
Payer: COMMERCIAL

## 2023-06-02 VITALS — HEART RATE: 87 BPM | OXYGEN SATURATION: 97 % | SYSTOLIC BLOOD PRESSURE: 110 MMHG | DIASTOLIC BLOOD PRESSURE: 60 MMHG

## 2023-06-02 DIAGNOSIS — M25.552 BILATERAL HIP PAIN: Primary | ICD-10-CM

## 2023-06-02 DIAGNOSIS — M25.551 BILATERAL HIP PAIN: Primary | ICD-10-CM

## 2023-06-02 PROCEDURE — 3074F SYST BP LT 130 MM HG: CPT | Performed by: ANESTHESIOLOGY

## 2023-06-02 PROCEDURE — 99214 OFFICE O/P EST MOD 30 MIN: CPT | Performed by: ANESTHESIOLOGY

## 2023-06-02 PROCEDURE — 3078F DIAST BP <80 MM HG: CPT | Performed by: ANESTHESIOLOGY

## 2023-06-02 NOTE — PROGRESS NOTES
Patient presents in office today with reported pain in bilateral hip flexors    Current pain level reported = 7-8/10    Last reported dose of n/a      Narcotic Contract renewal n/a    Urine Drug screen n/a

## 2023-06-05 ENCOUNTER — TELEPHONE (OUTPATIENT)
Dept: NEUROLOGY | Facility: CLINIC | Age: 58
End: 2023-06-05

## 2023-06-05 NOTE — TELEPHONE ENCOUNTER
Prior authorization request completed for: Bilateral hip flexor injection   Authorization # NO PRIOR AUTHORIZATION / PREDETERMINATION REQUIRED / VALID AND BILLABLE  Authorization dates: N/A  CPT codes approved: 51824 / 83167  Number of visits/dates of service approved: 1  Physician: Dr. Duke Rene   Location: Office W ultrasound     Call Ref#: [id number]  Representative Name: Vaelncia Magaña     Patient can be scheduled. Routed to Navigator. Other Location Units: 0

## 2023-06-05 NOTE — TELEPHONE ENCOUNTER
Spoke with patient and advised that insurance did not require pre certification -based on medical necessity. Patient VU and agreed to move forward with scheduling.      Scheduled patient for In Office bilateral hip flexor injection w/ultrasound on 06/12/23 @ 01:15PM

## 2023-06-12 ENCOUNTER — OFFICE VISIT (OUTPATIENT)
Dept: PAIN CLINIC | Facility: CLINIC | Age: 58
End: 2023-06-12
Payer: COMMERCIAL

## 2023-06-12 ENCOUNTER — NURSE ONLY (OUTPATIENT)
Dept: PAIN CLINIC | Facility: CLINIC | Age: 58
End: 2023-06-12
Payer: COMMERCIAL

## 2023-06-12 VITALS — OXYGEN SATURATION: 96 % | HEART RATE: 90 BPM | DIASTOLIC BLOOD PRESSURE: 76 MMHG | SYSTOLIC BLOOD PRESSURE: 128 MMHG

## 2023-06-12 DIAGNOSIS — M25.551 BILATERAL HIP PAIN: Primary | ICD-10-CM

## 2023-06-12 DIAGNOSIS — M25.551 HIP PAIN, RIGHT: ICD-10-CM

## 2023-06-12 DIAGNOSIS — M25.552 BILATERAL HIP PAIN: Primary | ICD-10-CM

## 2023-06-12 RX ORDER — TRIAMCINOLONE ACETONIDE 40 MG/ML
40 INJECTION, SUSPENSION INTRA-ARTICULAR; INTRAMUSCULAR ONCE
Status: COMPLETED | OUTPATIENT
Start: 2023-06-12 | End: 2023-06-12

## 2023-06-12 RX ORDER — BUPIVACAINE HYDROCHLORIDE 5 MG/ML
9 INJECTION, SOLUTION EPIDURAL; INTRACAUDAL ONCE
Status: COMPLETED | OUTPATIENT
Start: 2023-06-12 | End: 2023-06-12

## 2023-06-12 RX ORDER — LIDOCAINE HYDROCHLORIDE 10 MG/ML
10 INJECTION, SOLUTION INFILTRATION; PERINEURAL ONCE
Status: COMPLETED | OUTPATIENT
Start: 2023-06-12 | End: 2023-06-12

## 2023-06-12 NOTE — PROCEDURES
Date of procedure: June 12, 2023    Preop diagnosis: Bilateral hip pain    Postop diagnosis: Same    Procedure: Bilateral hip flexor muscle injection    Surgeon: Maryse Bobby    Anesthesia: Lidocaine    EBL: 0    Indication: Patient is a 50-year-old female with history of hip pain    Procedure detail: Informed consent was obtained. Timeout was done. Attention was turned towards the patient's right hip first.  Ultrasound imaging was used to identify the hip flexor muscle structures overlying the hip joint using an anterior approach at the level of the femoral head. The overlying soft tissue was then prepped and draped in the usual sterile fashion. The soft tissue was then anesthetized with 5 cc 1% lidocaine. Subsequently, a 21-gauge Stimuplex needle was advanced with imaging guidance towards the iliopsoas muscle. After negative aspiration for heme, a total mixture of 20 mg of triamcinolone with 4 cc of 0.5% bupivacaine was injected. There was excellent spread. It was then withdrawn tip intact. There is then repeated on the contralateral side. All imaging saved to PACS.     Maryse Bobby MD

## 2023-06-12 NOTE — PROGRESS NOTES
Timeout completed prior to procedure @ 9384. Participants present for timeout:  Dr. Princess Coyle, RN Sejal Stovall MA, and patient.

## 2023-06-12 NOTE — PROGRESS NOTES
Patient presents in office today with reported pain in bilateral hip    Current pain level reported = *9/10    Last reported dose of Celebrex

## 2023-06-16 DIAGNOSIS — M16.11 PRIMARY OSTEOARTHRITIS OF RIGHT HIP: ICD-10-CM

## 2023-06-16 DIAGNOSIS — M70.71 ILIOPSOAS BURSITIS OF RIGHT HIP: ICD-10-CM

## 2023-06-16 RX ORDER — CYCLOBENZAPRINE HCL 10 MG
10 TABLET ORAL 2 TIMES DAILY PRN
Qty: 30 TABLET | Refills: 0 | Status: SHIPPED | OUTPATIENT
Start: 2023-06-16

## 2023-06-16 NOTE — TELEPHONE ENCOUNTER
Refill Request    Medication request: cyclobenzaprine 10 MG Oral Tab    LOV: 11/10/2022 Mk Hagan DO   RTC: N/A  NOV: Visit date not found      [place]: 2/20/2023 #15 days    UDS: (if applicable): N/A  Pain contract: N/A    LOV plan (if weaning or changing medications): Flexeril 10mg BID PRN

## 2023-06-30 DIAGNOSIS — M16.11 PRIMARY OSTEOARTHRITIS OF RIGHT HIP: ICD-10-CM

## 2023-06-30 DIAGNOSIS — M16.12 PRIMARY OSTEOARTHRITIS OF LEFT HIP: ICD-10-CM

## 2023-07-05 RX ORDER — CELECOXIB 200 MG/1
CAPSULE ORAL
Qty: 180 CAPSULE | Refills: 0 | OUTPATIENT
Start: 2023-07-05

## 2023-07-05 NOTE — TELEPHONE ENCOUNTER
Last OV: 5/3/23  Last refill date: 5/15/23     #/refills: 180/0    Future Appointments   Date Time Provider Ester Hernandez   8/2/2023  8:20 AM Danie Jean MD [facility]            Over a month early.

## 2023-08-01 ENCOUNTER — TELEPHONE (OUTPATIENT)
Facility: CLINIC | Age: 58
End: 2023-08-01

## 2023-08-01 DIAGNOSIS — M25.551 BILATERAL HIP PAIN: Primary | ICD-10-CM

## 2023-08-01 DIAGNOSIS — M25.552 BILATERAL HIP PAIN: Primary | ICD-10-CM

## 2023-08-02 ENCOUNTER — HOSPITAL ENCOUNTER (OUTPATIENT)
Dept: GENERAL RADIOLOGY | Age: 58
Discharge: HOME | End: 2023-08-02
Attending: ORTHOPAEDIC SURGERY
Payer: COMMERCIAL

## 2023-08-02 ENCOUNTER — OFFICE VISIT (OUTPATIENT)
Dept: ORTHOPEDICS CLINIC | Facility: CLINIC | Age: 58
End: 2023-08-02
Payer: COMMERCIAL

## 2023-08-02 DIAGNOSIS — M25.552 BILATERAL HIP PAIN: ICD-10-CM

## 2023-08-02 DIAGNOSIS — M16.0 PRIMARY OSTEOARTHRITIS OF BOTH HIPS: Primary | ICD-10-CM

## 2023-08-02 DIAGNOSIS — M16.11 PRIMARY OSTEOARTHRITIS OF RIGHT HIP: ICD-10-CM

## 2023-08-02 DIAGNOSIS — M16.12 PRIMARY OSTEOARTHRITIS OF LEFT HIP: ICD-10-CM

## 2023-08-02 DIAGNOSIS — M25.551 BILATERAL HIP PAIN: ICD-10-CM

## 2023-08-02 PROCEDURE — 73523 X-RAY EXAM HIPS BI 5/> VIEWS: CPT | Performed by: ORTHOPAEDIC SURGERY

## 2023-08-02 PROCEDURE — 99214 OFFICE O/P EST MOD 30 MIN: CPT | Performed by: ORTHOPAEDIC SURGERY

## 2023-08-02 PROCEDURE — 20611 DRAIN/INJ JOINT/BURSA W/US: CPT | Performed by: ORTHOPAEDIC SURGERY

## 2023-08-02 RX ORDER — CELECOXIB 200 MG/1
200 CAPSULE ORAL 2 TIMES DAILY PRN
Qty: 180 CAPSULE | Refills: 3 | OUTPATIENT
Start: 2023-08-02

## 2023-08-02 RX ORDER — CELECOXIB 200 MG/1
200 CAPSULE ORAL 2 TIMES DAILY
Qty: 180 CAPSULE | Refills: 0 | Status: SHIPPED | OUTPATIENT
Start: 2023-08-02 | End: 2023-10-31

## 2023-08-02 RX ORDER — TRIAMCINOLONE ACETONIDE 40 MG/ML
80 INJECTION, SUSPENSION INTRA-ARTICULAR; INTRAMUSCULAR ONCE
Status: COMPLETED | OUTPATIENT
Start: 2023-08-02 | End: 2023-08-02

## 2023-08-02 RX ADMIN — TRIAMCINOLONE ACETONIDE 80 MG: 40 INJECTION, SUSPENSION INTRA-ARTICULAR; INTRAMUSCULAR at 10:00:00

## 2023-08-02 NOTE — PROCEDURES
After informed consent, the patient's right and left hips were marked, prepped with topical antiseptic, and locally anesthetized with skin refrigerant followed by injection of 1% lidocaine to create a skin wheal anteriorly at an insertion site a few fingerbreadths lateral to the femoral pulse, along the trajectory of the femoral neck. Next, the area was re-prepped with topical antiseptic, and ultrasound guidance was performed to direct a 20 gauge spinal needle into the hip joint at the junction of the femoral head and neck, after which a mixture of 1mL 40mg/mL Kenalog, 2mL 1% lidocaine and 2mL 0.5% marcaine was injected while visualizing the fluid under ultrasound. Confirmatory imaging was saved to the patient's chart. A band-aid was applied. The patient tolerated the procedure well.     Harrison Manning MD, [address] [facility]  Phone [phone]  Fax [phone]

## 2023-08-02 NOTE — PROGRESS NOTES
[facility] Progress Note    Subjective: Ms. Siri Perera returns to clinic today to discuss both of her hips. She states that the injections have continued to provide relief. She has not gotten much relief from interventions for her back. She does report that she continues to have back pain, as well as pain around the front of both thighs but this is not as bad as it was prior to her initial appointment. She has been staying active, has been using treadmill for walking as well as elliptical machine. Objective: Patient is very pleasant, appears fit, well dressed. There is mild stiffness and discomfort with hip motion. Imaging: X-rays of both hips personally viewed, independently interpreted and radiology report read. Compared to films over the past year, demonstrates progression of osteoarthritis in both hips, radiographically severe on the right with complete loss of joint space, subchondral sclerosis, osteophytes and subchondral cyst.  Left hip demonstrates progression to moderate to severe osteoarthritis. Assessment/Plan: 18-year-old female with bilateral hip pain/hip osteoarthritis severe on the right and moderate to severe on the left. Revisited discussion of treatment options, including nonsurgical and surgical.  She would like to continue with nonsurgical treatments for now. Injections were performed for both hips, and Celebrex was refilled. Advised that this can be repeated as often as every 3 months. She will set up an appointment and we will reassess at that time.     Ayan Leavitt MD, [address] [facility]  Phone [phone]  Fax [phone]

## 2023-08-24 ENCOUNTER — PATIENT MESSAGE (OUTPATIENT)
Dept: PHYSICAL MEDICINE AND REHAB | Facility: CLINIC | Age: 58
End: 2023-08-24

## 2023-08-25 NOTE — TELEPHONE ENCOUNTER
From: Ana Silva  To: Yasmine Santana DO  Sent: 8/24/2023 6:51 PM CDT  Subject: Appointment     Hello,    I received a message from your office, requesting me to come 20 minutes earlier to my appointment. If you would like me to come 20 minutes earlier, I can. Please let me know if you still need me to come 20 minutes earlier and if so can you please update mychart.     Thank you,  Graeme Gonzalez

## 2023-09-08 ENCOUNTER — TELEPHONE (OUTPATIENT)
Dept: INTERNAL MEDICINE CLINIC | Facility: CLINIC | Age: 58
End: 2023-09-08

## 2023-09-08 ENCOUNTER — OFFICE VISIT (OUTPATIENT)
Dept: PHYSICAL MEDICINE AND REHAB | Facility: CLINIC | Age: 58
End: 2023-09-08
Payer: COMMERCIAL

## 2023-09-08 VITALS
DIASTOLIC BLOOD PRESSURE: 72 MMHG | HEIGHT: 64 IN | SYSTOLIC BLOOD PRESSURE: 116 MMHG | WEIGHT: 145 LBS | BODY MASS INDEX: 24.75 KG/M2

## 2023-09-08 DIAGNOSIS — Z00.00 ROUTINE GENERAL MEDICAL EXAMINATION AT A HEALTH CARE FACILITY: Primary | ICD-10-CM

## 2023-09-08 DIAGNOSIS — Z13.0 SCREENING FOR DISORDER OF BLOOD AND BLOOD-FORMING ORGANS: ICD-10-CM

## 2023-09-08 DIAGNOSIS — M53.3 PAIN OF BOTH SACROILIAC JOINTS: Primary | ICD-10-CM

## 2023-09-08 DIAGNOSIS — Z13.228 SCREENING FOR METABOLIC DISORDER: ICD-10-CM

## 2023-09-08 DIAGNOSIS — Z13.29 SCREENING FOR THYROID DISORDER: ICD-10-CM

## 2023-09-08 DIAGNOSIS — M76.899 HIP FLEXOR TENDONITIS, UNSPECIFIED LATERALITY: ICD-10-CM

## 2023-09-08 DIAGNOSIS — Z13.220 SCREENING FOR LIPID DISORDERS: ICD-10-CM

## 2023-09-08 PROCEDURE — 3008F BODY MASS INDEX DOCD: CPT | Performed by: PHYSICAL MEDICINE & REHABILITATION

## 2023-09-08 PROCEDURE — 3074F SYST BP LT 130 MM HG: CPT | Performed by: PHYSICAL MEDICINE & REHABILITATION

## 2023-09-08 PROCEDURE — 99214 OFFICE O/P EST MOD 30 MIN: CPT | Performed by: PHYSICAL MEDICINE & REHABILITATION

## 2023-09-08 PROCEDURE — 3078F DIAST BP <80 MM HG: CPT | Performed by: PHYSICAL MEDICINE & REHABILITATION

## 2023-09-08 RX ORDER — ATORVASTATIN CALCIUM 20 MG/1
10 TABLET, FILM COATED ORAL NIGHTLY
COMMUNITY

## 2023-09-08 NOTE — TELEPHONE ENCOUNTER
Future Appointments   Date Time Provider Ester Mary   11/9/2023  1:00 PM URBANO Muhammad [facility]     Informed must fast no call back required.  Orders to Patricia Oro

## 2023-09-11 ENCOUNTER — TELEPHONE (OUTPATIENT)
Dept: PHYSICAL MEDICINE AND REHAB | Facility: CLINIC | Age: 58
End: 2023-09-11

## 2023-09-15 ENCOUNTER — PATIENT MESSAGE (OUTPATIENT)
Dept: PHYSICAL MEDICINE AND REHAB | Facility: CLINIC | Age: 58
End: 2023-09-15

## 2023-10-03 ENCOUNTER — TELEPHONE (OUTPATIENT)
Dept: INTERNAL MEDICINE CLINIC | Facility: CLINIC | Age: 58
End: 2023-10-03

## 2023-10-03 DIAGNOSIS — Z23 NEED FOR VACCINATION: Primary | ICD-10-CM

## 2023-10-03 NOTE — TELEPHONE ENCOUNTER
3rd Hep b   Future Appointments   Date Time Provider Ester Mary   10/4/2023  1:00 PM [facility]

## 2023-10-04 ENCOUNTER — NURSE ONLY (OUTPATIENT)
Dept: INTERNAL MEDICINE CLINIC | Facility: CLINIC | Age: 58
End: 2023-10-04
Payer: COMMERCIAL

## 2023-10-04 PROCEDURE — 90746 HEPB VACCINE 3 DOSE ADULT IM: CPT | Performed by: INTERNAL MEDICINE

## 2023-10-04 PROCEDURE — 90471 IMMUNIZATION ADMIN: CPT | Performed by: INTERNAL MEDICINE

## 2023-10-08 DIAGNOSIS — M16.0 PRIMARY OSTEOARTHRITIS OF BOTH HIPS: ICD-10-CM

## 2023-10-09 RX ORDER — CELECOXIB 200 MG/1
200 CAPSULE ORAL 2 TIMES DAILY
Qty: 180 CAPSULE | Refills: 3 | Status: SHIPPED | OUTPATIENT
Start: 2023-10-09

## 2023-10-09 NOTE — TELEPHONE ENCOUNTER
Celecoxib    DOS: ***  Last OV: 08/02/23  Last refill date: 08/02/23 #/refills: 180/0  Upcoming appt:   Future Appointments   Date Time Provider Department Center   11/6/2023  8:20 AM Ruddy Sanchez MD [facility]

## 2023-10-13 ENCOUNTER — OFFICE VISIT (OUTPATIENT)
Dept: SURGERY | Facility: CLINIC | Age: 58
End: 2023-10-13
Payer: COMMERCIAL

## 2023-10-13 DIAGNOSIS — M53.3 PAIN OF BOTH SACROILIAC JOINTS: Primary | ICD-10-CM

## 2023-10-13 PROCEDURE — 27096 INJECT SACROILIAC JOINT: CPT | Performed by: PHYSICAL MEDICINE & REHABILITATION

## 2023-10-14 NOTE — PROCEDURES
Patient Name: Vida Kwon MRN: [id number] Date of Surgery: 10/13/2023     PREOPERATIVE DIAGNOSIS Pain in both sacroiliac joints. POSTOPERATIVE DIAGNOSIS Pain in both sacroiliac joints. PROCEDURE PERFORMED Bilateral sacroiliac joint injections under fluoroscopy with contrast.      SURGEON: Ish Hendricks DO     ANESTHESIA: Local.        DESCRIPTION OF PROCEDURE: Informed consent was obtained and verified. The patient was brought to the procedure room and positioned prone on the procedure room table with a pillow underneath her abdomen. Vital signs were monitored throughout the case. The overlying skin was cleansed and draped in the usual sterile fashion using chlorhexidine. Under AP fluoroscopy, the right sacroiliac joint was identified. The overlying skin and soft tissue was anesthetized with 2 mL of 1% preservative-free lidocaine without epinephrine. A 22-gauge Quincke needle was then advanced into the inferior pole of the right sacroiliac joint under intermittent AP fluoroscopy. When the needle was thought to be in good position, aspiration was performed and it was negative for heme, air, and CSF. The patient was then injected with Omnipaque-240, which showed an arthrogram pattern without a vascular pattern. The patient was then injected with 40 mg of Kenalog and 1 mL of 1% preservative-free lidocaine without epinephrine for a total injectate of 2 mL. The needle was then withdrawn. Attention was turned to the leftt sacroiliac joint. Under AP fluoroscopy, the inferior pole of the leftt sacroiliac joint was identified. The overlying skin and soft tissue was anesthetized with 2 mL of 1% preservative-free lidocaine without epinephrine. A 22-gauge Quincke needle was then advanced into the inferior pole of the left sacroiliac joint under intermittent fluoroscopy. When the needle was felt to be in good position, aspiration was performed and it was negative for heme, air, or CSF.  The patient was then injected with Omnipaque-240, which showed an arthrogram pattern without a vascular pattern seen. The patient was then injected with 40 mg of Kenalog and 1 mL of 1% preservative-free lidocaine without epinephrine for a total injectate of 2 mL. The needle was then withdrawn. Bandages were placed over the site of the injections. The patient was brought to Recovery in stable condition. She will follow up with me within the next 4 weeks. There were no complications from the procedure.            D: 10/13/2023 _________________________  T: 10/14/2023 DO SURY Villa/[id number]/M

## 2023-10-27 ENCOUNTER — OFFICE VISIT (OUTPATIENT)
Dept: PHYSICAL MEDICINE AND REHAB | Facility: CLINIC | Age: 58
End: 2023-10-27

## 2023-10-27 DIAGNOSIS — M70.71 ILIOPSOAS BURSITIS OF BOTH HIPS: ICD-10-CM

## 2023-10-27 DIAGNOSIS — M70.72 ILIOPSOAS BURSITIS OF BOTH HIPS: ICD-10-CM

## 2023-10-27 DIAGNOSIS — M47.816 LUMBAR FACET ARTHROPATHY: Primary | ICD-10-CM

## 2023-10-27 PROCEDURE — 20611 DRAIN/INJ JOINT/BURSA W/US: CPT | Performed by: PHYSICAL MEDICINE & REHABILITATION

## 2023-10-27 PROCEDURE — 99214 OFFICE O/P EST MOD 30 MIN: CPT | Performed by: PHYSICAL MEDICINE & REHABILITATION

## 2023-10-27 NOTE — PROGRESS NOTES
Progress note    C/C: low back pain     HPI: Patient presents today primarily for bilateral iliopsoas bursa steroid injections under ultrasound guidance; also wants to discuss treatment of chronic axial lower back pain across the waistline in a bandlike pattern without radiation. No relief with sacroiliac joint injection. Did have relief from bilateral L3, L4, L5 medial branch radiofrequency neurotomies done by Dr. Yudi Coyne on March 16, 2023. Pertinent allergies:   Cephalexin              HIVES  Penicillins             UNKNOWN, RASH     Physical exam:  [facility] 11/09/2022      Lumbar spine exam:    No pain with lumbar flexion. No pain with lumbar extension. 5/5 LE strength b/l  2/4 quad, gastrocs reflexes b/l  Facet loading negative b/l    Imaging: No new imaging to review    Assessment and plan  Lumbar facet arthropathy  Bilateral primary osteoarthritis of both hips  B/l iliopsoas bursitis    Recommend repeat bilateral L3, L4, L5 medial branch radiofrequency neurotomies under fluoroscopy, IV conscious sedation. We discussed the risks of procedure, including bleeding, infection, nerve injury, SE from sedation; elects to proceed. F/u for RFA.      Vane Milan DO  Physical Medicine and [address] [place] [address]

## 2023-10-27 NOTE — PROCEDURES
Dx: bilateral iliopsoas bursitis  Procedure: bilateral iliopsoas bursa steroid injection under US guidance     Informed consent obtained and verified. First the left side was addressed. Scanning proximally to distally using a curivlinear the ilipsoas tendon and bursa were identified in short axis view, as were the femoral artery, nerve and vein in order to avoid these structures. The overlying skin was cleansed with betadine swabs x3 and the ultrasound probe was prepped in a sterile manner. The overlying soft tissue was anesthetized with 2ml 1% PF lidocaine without epinerphine using a 27g needle. Using a 22g 2 1/2 inch needle the ilipsoas bursa was accessed using an in-plane approach. Aspiration was negative for heme. The iliopsoas bursa was then injected with 2ml of 1ml 1% PF lidocaine and 40mg of kenalog. The needle was withdrawn. There were no complications from the procedure. The right side was then addressed. Scanning proximally to distally using a curivlinear the ilipsoas tendon and bursa were identified in short axis view, as were the femoral artery, nerve and vein in order to avoid these structures. The overlying skin was cleansed with betadine swabs x3 and the ultrasound probe was prepped in a sterile manner. The overlying soft tissue was anesthetized with 2ml 1% PF lidocaine without epinerphine using a 27g needle. Using a 22g 2 1/2 inch needle the ilipsoas bursa was accessed using an in-plane approach. Aspiration was negative for heme. The iliopsoas bursa was then injected with 2ml of 1ml 1% PF lidocaine and 40mg of kenalog. The needle was withdrawn. There were no complications from the procedure. Image was saved of the injection.       McLaren Oaklander DO  Physical Medicine and [address] [place] [address]

## 2023-11-01 ENCOUNTER — TELEPHONE (OUTPATIENT)
Dept: PHYSICAL MEDICINE AND REHAB | Facility: CLINIC | Age: 58
End: 2023-11-01

## 2023-11-01 NOTE — TELEPHONE ENCOUNTER
Initiated authorization for Bilateral L3, L4, L5 medial nerve branch neurotomies under fluoroscopy, IVCS procedure.  Dx code C69.280 facility [facility] with [address] [place] [address] CPT Code 60368-00, 28928 x2, 59350 x2   Reference # [id number]   Status Pending auth    Added clinicals via [facility] website

## 2023-11-02 ENCOUNTER — PATIENT MESSAGE (OUTPATIENT)
Dept: PHYSICAL MEDICINE AND REHAB | Facility: CLINIC | Age: 58
End: 2023-11-02

## 2023-11-06 NOTE — TELEPHONE ENCOUNTER
From: Erma Alcala  To: Lorenzo Reynaga  Sent: 11/2/2023 2:29 PM CDT  Subject: Ablation    Hello,  I am waiting for someone to contact me regarding scheduling an ablation? I look forward to hearing from you soon.   Kind Regards,  Moe Pierson

## 2023-11-06 NOTE — TELEPHONE ENCOUNTER
Initiated authorization for Bilateral L3, L4, L5 medial nerve branch neurotomies under fluoroscopy, IVCS procedure.  Dx code S50.775 facility [facility] with [address] [place] [address] CPT Code 86254-13, 38962 x2, 21596 x2   Reference # [id number]   Status denied    If  would like to appeal a letter of medical necessity can be submitted to [phone]

## 2023-11-07 NOTE — TELEPHONE ENCOUNTER
For what reason(s) was the RFA denied? I do not see it in her chart. Coronary artery disease involving native coronary artery of native heart without angina pectoris

## 2023-11-10 NOTE — TELEPHONE ENCOUNTER
Patient calling to speak to the ins/ref department as she is in need of on explanation on why her procedure was denied.  She can be reached at [phone]

## 2023-11-15 ENCOUNTER — PATIENT MESSAGE (OUTPATIENT)
Dept: PHYSICAL MEDICINE AND REHAB | Facility: CLINIC | Age: 58
End: 2023-11-15

## 2023-11-20 NOTE — TELEPHONE ENCOUNTER
Spoke with patient and informed her we were waiting to hear back from out referrals team to see exactly why he procedure was denied. Informed patient we want to know this information so we know exactly what needs to be included in the letter to get the denial over turned. Patient understood and stated she would really like to get this procedure before the end of the year as her deductible has already been met. Informed patient message will be sent to our referrals team for an update. Patient would also like to be sent a [employer] message with an update on the appeal status. Message sent to referrals team high priority.

## 2023-11-20 NOTE — TELEPHONE ENCOUNTER
Per [facility] stated they did not receive clinicals and that is why the case was denied. Appeal letter pended for 's e-signature. Once approved and signed by [facility], will need to forward to referrals team to send appeal letter and clinicals to [facility].

## 2023-11-20 NOTE — TELEPHONE ENCOUNTER
Spoke with Tomasz Hart at [facility] insurance inquiring about the denial. Per [facility] procedure was denied due to lack of medical necessity. I did request the denial letter to be faxed to the office to see specifically what's going on.  I was told it would be delivered by EOD  Reference # for call [id number]

## 2023-11-20 NOTE — TELEPHONE ENCOUNTER
Received denial letter. Insurance has denied the procedure due to claiming they never received the clinicals that were submitted on 11/1.  If dr would still like to appeal decision a letter of medical necessity can be submitted to [phone]

## 2023-12-08 ENCOUNTER — LAB ENCOUNTER (OUTPATIENT)
Dept: LAB | Age: 58
End: 2023-12-08
Attending: NURSE PRACTITIONER
Payer: COMMERCIAL

## 2023-12-08 DIAGNOSIS — Z13.228 SCREENING FOR METABOLIC DISORDER: ICD-10-CM

## 2023-12-08 DIAGNOSIS — Z13.29 SCREENING FOR THYROID DISORDER: ICD-10-CM

## 2023-12-08 DIAGNOSIS — Z13.220 SCREENING FOR LIPID DISORDERS: ICD-10-CM

## 2023-12-08 DIAGNOSIS — M25.551 HIP PAIN, RIGHT: ICD-10-CM

## 2023-12-08 DIAGNOSIS — Z00.00 ROUTINE GENERAL MEDICAL EXAMINATION AT A HEALTH CARE FACILITY: ICD-10-CM

## 2023-12-08 DIAGNOSIS — Z13.0 SCREENING FOR DISORDER OF BLOOD AND BLOOD-FORMING ORGANS: ICD-10-CM

## 2023-12-08 LAB
ALBUMIN SERPL-MCNC: 3.9 G/DL (ref 3.4–5)
ALBUMIN/GLOB SERPL: 1.4 {RATIO} (ref 1–2)
ALP LIVER SERPL-CCNC: 60 U/L
ALT SERPL-CCNC: 22 U/L
ANION GAP SERPL CALC-SCNC: 5 MMOL/L (ref 0–18)
AST SERPL-CCNC: 15 U/L (ref 15–37)
BASOPHILS # BLD AUTO: 0.06 X10(3) UL (ref 0–0.2)
BASOPHILS NFR BLD AUTO: 0.7 %
BILIRUB SERPL-MCNC: 0.5 MG/DL (ref 0.1–2)
BILIRUB UR QL STRIP.AUTO: NEGATIVE
BUN BLD-MCNC: 14 MG/DL (ref 9–23)
CALCIUM BLD-MCNC: 9.3 MG/DL (ref 8.5–10.1)
CHLORIDE SERPL-SCNC: 109 MMOL/L (ref 98–112)
CHOLEST SERPL-MCNC: 212 MG/DL (ref ?–200)
CLARITY UR REFRACT.AUTO: CLEAR
CO2 SERPL-SCNC: 27 MMOL/L (ref 21–32)
CREAT BLD-MCNC: 0.7 MG/DL
EGFRCR SERPLBLD CKD-EPI 2021: 101 ML/MIN/1.73M2 (ref 60–?)
EOSINOPHIL # BLD AUTO: 0.1 X10(3) UL (ref 0–0.7)
EOSINOPHIL NFR BLD AUTO: 1.2 %
ERYTHROCYTE [DISTWIDTH] IN BLOOD BY AUTOMATED COUNT: 14.2 %
FASTING PATIENT LIPID ANSWER: YES
FASTING STATUS PATIENT QL REPORTED: YES
GLOBULIN PLAS-MCNC: 2.7 G/DL (ref 2.8–4.4)
GLUCOSE BLD-MCNC: 91 MG/DL (ref 70–99)
GLUCOSE UR STRIP.AUTO-MCNC: NORMAL MG/DL
HCT VFR BLD AUTO: 47.8 %
HDLC SERPL-MCNC: 64 MG/DL (ref 40–59)
HGB BLD-MCNC: 15.7 G/DL
IMM GRANULOCYTES # BLD AUTO: 0.05 X10(3) UL (ref 0–1)
IMM GRANULOCYTES NFR BLD: 0.6 %
KETONES UR STRIP.AUTO-MCNC: NEGATIVE MG/DL
LDLC SERPL CALC-MCNC: 127 MG/DL (ref ?–100)
LEUKOCYTE ESTERASE UR QL STRIP.AUTO: NEGATIVE
LYMPHOCYTES # BLD AUTO: 1.78 X10(3) UL (ref 1–4)
LYMPHOCYTES NFR BLD AUTO: 20.5 %
MCH RBC QN AUTO: 28.2 PG (ref 26–34)
MCHC RBC AUTO-ENTMCNC: 32.8 G/DL (ref 31–37)
MCV RBC AUTO: 86 FL
MONOCYTES # BLD AUTO: 0.68 X10(3) UL (ref 0.1–1)
MONOCYTES NFR BLD AUTO: 7.8 %
NEUTROPHILS # BLD AUTO: 6 X10 (3) UL (ref 1.5–7.7)
NEUTROPHILS # BLD AUTO: 6 X10(3) UL (ref 1.5–7.7)
NEUTROPHILS NFR BLD AUTO: 69.2 %
NITRITE UR QL STRIP.AUTO: NEGATIVE
NONHDLC SERPL-MCNC: 148 MG/DL (ref ?–130)
OSMOLALITY SERPL CALC.SUM OF ELEC: 292 MOSM/KG (ref 275–295)
PH UR STRIP.AUTO: 6 [PH] (ref 5–8)
PLATELET # BLD AUTO: 255 10(3)UL (ref 150–450)
POTASSIUM SERPL-SCNC: 4 MMOL/L (ref 3.5–5.1)
PROT SERPL-MCNC: 6.6 G/DL (ref 6.4–8.2)
PROT UR STRIP.AUTO-MCNC: NEGATIVE MG/DL
RBC # BLD AUTO: 5.56 X10(6)UL
SODIUM SERPL-SCNC: 141 MMOL/L (ref 136–145)
SP GR UR STRIP.AUTO: 1.02 (ref 1–1.03)
TRIGL SERPL-MCNC: 120 MG/DL (ref 30–149)
TSI SER-ACNC: 0.61 MIU/ML (ref 0.36–3.74)
UROBILINOGEN UR STRIP.AUTO-MCNC: NORMAL MG/DL
VLDLC SERPL CALC-MCNC: 21 MG/DL (ref 0–30)
WBC # BLD AUTO: 8.7 X10(3) UL (ref 4–11)

## 2023-12-08 PROCEDURE — 85025 COMPLETE CBC W/AUTO DIFF WBC: CPT

## 2023-12-08 PROCEDURE — 80061 LIPID PANEL: CPT

## 2023-12-08 PROCEDURE — 84443 ASSAY THYROID STIM HORMONE: CPT

## 2023-12-08 PROCEDURE — 80053 COMPREHEN METABOLIC PANEL: CPT

## 2023-12-08 PROCEDURE — 36415 COLL VENOUS BLD VENIPUNCTURE: CPT

## 2023-12-08 PROCEDURE — 81001 URINALYSIS AUTO W/SCOPE: CPT

## 2023-12-13 ENCOUNTER — OFFICE VISIT (OUTPATIENT)
Dept: ORTHOPEDICS CLINIC | Facility: CLINIC | Age: 58
End: 2023-12-13
Payer: COMMERCIAL

## 2023-12-13 DIAGNOSIS — M16.0 PRIMARY OSTEOARTHRITIS OF BOTH HIPS: Primary | ICD-10-CM

## 2023-12-13 PROCEDURE — 20611 DRAIN/INJ JOINT/BURSA W/US: CPT | Performed by: ORTHOPAEDIC SURGERY

## 2023-12-13 RX ORDER — ATORVASTATIN CALCIUM 10 MG/1
TABLET, FILM COATED ORAL
COMMUNITY
Start: 2023-10-25 | End: 2023-12-14

## 2023-12-13 RX ORDER — TRIAMCINOLONE ACETONIDE 40 MG/ML
80 INJECTION, SUSPENSION INTRA-ARTICULAR; INTRAMUSCULAR ONCE
Status: COMPLETED | OUTPATIENT
Start: 2023-12-13 | End: 2023-12-13

## 2023-12-13 RX ADMIN — TRIAMCINOLONE ACETONIDE 80 MG: 40 INJECTION, SUSPENSION INTRA-ARTICULAR; INTRAMUSCULAR at 12:16:00

## 2023-12-13 NOTE — PROCEDURES
Very pleasant 41year old female returns for hip injections. States that the last injections provided relief, hip flexor injections are no longer providing relief. She is looking to undergo radiofrequency ablation of the spine before the end of the year. Risks and benefits of hip injection discussed with the patient, with risks including but not limited to pain and swelling at the injection site and/or within the hip joint, infection, elevation in blood pressure and/or glucose levels, facial flushing. After informed consent, the patient's right and left hips were marked, prepped with topical antiseptic, and locally anesthetized with skin refrigerant followed by injection of 1% lidocaine to create a skin wheal anteriorly at an insertion site a few fingerbreadths lateral to the femoral pulse, along the trajectory of the femoral neck. Next, the area was re-prepped with topical antiseptic, and ultrasound guidance was performed to direct a 20 gauge spinal needle into the hip joint at the junction of the femoral head and neck, after which a mixture of 1mL 40mg/mL Kenalog, 2mL 1% lidocaine and 2mL 0.5% marcaine was injected while visualizing the fluid under ultrasound. Confirmatory imaging was saved to the patient's chart. A band-aid was applied. The patient tolerated the procedure well. She is going to return in 3 months. We will obtain new x-rays of the hips at that visit.     Erma Murphy MD, [address] [facility]  Phone [phone]  Fax [phone]

## 2023-12-14 ENCOUNTER — OFFICE VISIT (OUTPATIENT)
Dept: INTERNAL MEDICINE CLINIC | Facility: CLINIC | Age: 58
End: 2023-12-14
Payer: COMMERCIAL

## 2023-12-14 VITALS
HEIGHT: 64 IN | BODY MASS INDEX: 24.82 KG/M2 | OXYGEN SATURATION: 99 % | HEART RATE: 82 BPM | SYSTOLIC BLOOD PRESSURE: 114 MMHG | WEIGHT: 145.38 LBS | RESPIRATION RATE: 18 BRPM | TEMPERATURE: 97 F | DIASTOLIC BLOOD PRESSURE: 68 MMHG

## 2023-12-14 DIAGNOSIS — Z00.00 ROUTINE GENERAL MEDICAL EXAMINATION AT A HEALTH CARE FACILITY: Primary | ICD-10-CM

## 2023-12-14 DIAGNOSIS — M16.0 PRIMARY OSTEOARTHRITIS OF BOTH HIPS: ICD-10-CM

## 2023-12-14 DIAGNOSIS — Z12.31 ENCOUNTER FOR SCREENING MAMMOGRAM FOR MALIGNANT NEOPLASM OF BREAST: ICD-10-CM

## 2023-12-14 DIAGNOSIS — E78.5 DYSLIPIDEMIA: ICD-10-CM

## 2023-12-14 DIAGNOSIS — M47.816 LUMBAR FACET ARTHROPATHY: ICD-10-CM

## 2023-12-14 PROCEDURE — 3074F SYST BP LT 130 MM HG: CPT | Performed by: NURSE PRACTITIONER

## 2023-12-14 PROCEDURE — 90471 IMMUNIZATION ADMIN: CPT | Performed by: NURSE PRACTITIONER

## 2023-12-14 PROCEDURE — 3078F DIAST BP <80 MM HG: CPT | Performed by: NURSE PRACTITIONER

## 2023-12-14 PROCEDURE — 90715 TDAP VACCINE 7 YRS/> IM: CPT | Performed by: NURSE PRACTITIONER

## 2023-12-14 PROCEDURE — 3008F BODY MASS INDEX DOCD: CPT | Performed by: NURSE PRACTITIONER

## 2023-12-14 PROCEDURE — 99396 PREV VISIT EST AGE 40-64: CPT | Performed by: NURSE PRACTITIONER

## 2023-12-14 RX ORDER — ATORVASTATIN CALCIUM 10 MG/1
10 TABLET, FILM COATED ORAL DAILY
Qty: 90 TABLET | Refills: 3 | Status: SHIPPED | OUTPATIENT
Start: 2023-12-14

## 2023-12-29 ENCOUNTER — OFFICE VISIT (OUTPATIENT)
Dept: SURGERY | Facility: CLINIC | Age: 58
End: 2023-12-29
Payer: COMMERCIAL

## 2023-12-29 DIAGNOSIS — M16.11 PRIMARY OSTEOARTHRITIS OF RIGHT HIP: ICD-10-CM

## 2023-12-29 DIAGNOSIS — M70.71 ILIOPSOAS BURSITIS OF RIGHT HIP: ICD-10-CM

## 2023-12-29 RX ORDER — CYCLOBENZAPRINE HCL 10 MG
10 TABLET ORAL 2 TIMES DAILY PRN
Qty: 30 TABLET | Refills: 0 | Status: SHIPPED | OUTPATIENT
Start: 2023-12-29

## 2023-12-29 NOTE — PROGRESS NOTES
Patient here for RFA lumbar spine; awoke with a stiff neck. Requesting flexeril 10mg BID PRN muscle spasms. Medication sent to pharmacy. See other note for RFA details.      Thaddeus Freitas DO  Physical Medicine and [facility] [address]

## 2024-02-02 ENCOUNTER — HOSPITAL ENCOUNTER (OUTPATIENT)
Dept: MAMMOGRAPHY | Age: 59
Discharge: HOME | End: 2024-02-02
Attending: NURSE PRACTITIONER
Payer: COMMERCIAL

## 2024-02-02 DIAGNOSIS — R92.2 INCONCLUSIVE MAMMOGRAM: Primary | ICD-10-CM

## 2024-02-02 DIAGNOSIS — Z12.31 ENCOUNTER FOR SCREENING MAMMOGRAM FOR MALIGNANT NEOPLASM OF BREAST: ICD-10-CM

## 2024-02-02 PROCEDURE — 77067 SCR MAMMO BI INCL CAD: CPT | Performed by: NURSE PRACTITIONER

## 2024-02-02 PROCEDURE — 77063 BREAST TOMOSYNTHESIS BI: CPT | Performed by: NURSE PRACTITIONER

## 2024-02-03 ENCOUNTER — PATIENT MESSAGE (OUTPATIENT)
Dept: ORTHOPEDICS CLINIC | Facility: CLINIC | Age: 59
End: 2024-02-03

## 2024-02-05 NOTE — TELEPHONE ENCOUNTER
Were you recommending /Natty?    I see that she already saw Physiatry in the past in regards to L3,L4 and L5    Spoke to patient to see if she wanted to go the surgical route or non surgical    Patient states she wants to go the non surgical route .   Per spinal specialist they recommend seeing  and seeing where to go from there    Patient states she doesn't want to go the surgical route and doesn't want to see a nurse practitioner for the concern.    She states she will think about it and give us a call back       Closing encounter as  is aware of conversation

## 2024-02-05 NOTE — TELEPHONE ENCOUNTER
From: Fozia Ivan  To: Ruddy Sanchez  Sent: 2/3/2024 2:45 PM CST  Subject: Question    Hi Dr. Sanchez,  I hope you are doing well. Could you please give me the name of the spinal specialist that you once recommended to me? I believe he may have been located in your office. I appreciate your help.  Kind Regards,  Fozia Ivan

## 2024-02-16 ENCOUNTER — HOSPITAL ENCOUNTER (OUTPATIENT)
Dept: MAMMOGRAPHY | Facility: HOSPITAL | Age: 59
Discharge: HOME | End: 2024-02-16
Attending: NURSE PRACTITIONER
Payer: COMMERCIAL

## 2024-02-16 DIAGNOSIS — R92.2 INCONCLUSIVE MAMMOGRAM: ICD-10-CM

## 2024-02-16 PROCEDURE — 77065 DX MAMMO INCL CAD UNI: CPT | Performed by: NURSE PRACTITIONER

## 2024-02-16 PROCEDURE — 77061 BREAST TOMOSYNTHESIS UNI: CPT | Performed by: NURSE PRACTITIONER

## 2024-03-05 ENCOUNTER — TELEPHONE (OUTPATIENT)
Facility: CLINIC | Age: 59
End: 2024-03-05

## 2024-03-05 DIAGNOSIS — M25.551 BILATERAL HIP PAIN: Primary | ICD-10-CM

## 2024-03-05 DIAGNOSIS — M25.552 BILATERAL HIP PAIN: Primary | ICD-10-CM

## 2024-03-05 NOTE — TELEPHONE ENCOUNTER
Xr's ordered per last office note patient aware to arrive early to complete images before seeing provider as I notified her via my-chart

## 2024-03-06 ENCOUNTER — OFFICE VISIT (OUTPATIENT)
Dept: ORTHOPEDICS CLINIC | Facility: CLINIC | Age: 59
End: 2024-03-06
Payer: COMMERCIAL

## 2024-03-06 ENCOUNTER — HOSPITAL ENCOUNTER (OUTPATIENT)
Dept: GENERAL RADIOLOGY | Age: 59
Discharge: HOME | End: 2024-03-06
Attending: ORTHOPAEDIC SURGERY
Payer: COMMERCIAL

## 2024-03-06 VITALS — WEIGHT: 148.63 LBS | BODY MASS INDEX: 25.38 KG/M2 | HEIGHT: 64 IN

## 2024-03-06 DIAGNOSIS — M16.0 PRIMARY OSTEOARTHRITIS OF BOTH HIPS: Primary | ICD-10-CM

## 2024-03-06 DIAGNOSIS — M25.551 BILATERAL HIP PAIN: ICD-10-CM

## 2024-03-06 DIAGNOSIS — M25.552 BILATERAL HIP PAIN: ICD-10-CM

## 2024-03-06 PROCEDURE — 99214 OFFICE O/P EST MOD 30 MIN: CPT | Performed by: ORTHOPAEDIC SURGERY

## 2024-03-06 PROCEDURE — 73523 X-RAY EXAM HIPS BI 5/> VIEWS: CPT | Performed by: ORTHOPAEDIC SURGERY

## 2024-03-06 PROCEDURE — 20611 DRAIN/INJ JOINT/BURSA W/US: CPT | Performed by: ORTHOPAEDIC SURGERY

## 2024-03-06 RX ORDER — TRIAMCINOLONE ACETONIDE 40 MG/ML
80 INJECTION, SUSPENSION INTRA-ARTICULAR; INTRAMUSCULAR ONCE
Status: COMPLETED | OUTPATIENT
Start: 2024-03-06 | End: 2024-03-06

## 2024-03-06 RX ADMIN — TRIAMCINOLONE ACETONIDE 80 MG: 40 INJECTION, SUSPENSION INTRA-ARTICULAR; INTRAMUSCULAR at 09:48:00

## 2024-03-06 NOTE — PROGRESS NOTES
[facility] Progress Note    Subjective: Return patient for bilateral hip pain.  Since last visit and last injection 3 months ago, symptoms have gotten worse especially on the right.  She notes more creaking and cracking around the hip, demonstrates this with bending forward as well as with hip motion.  She states the last injection helped until just short of the 3-month brenda.  She feels that pain is getting worse and activities are more limited due to the hip, but does not feel ready yet for hip replacement.    Objective: Patient appears very comfortable, fit and pleasant.  There is no focal tenderness about the hip joint.  Passive range of motion of the hip as well as active flexion forward does reproduce creaking/crepitus about the hip joint.      Imaging: Updated x-rays of the bilateral hips and pelvis personally viewed, independently interpreted and radiology report read.  Demonstrates bilateral severe osteoarthritis.  Right hip does have some progression compared to films from last year, with some flattening of the femoral head, increased sclerosis subchondral, as well as new cysts subchondral within the femoral head.      Assessment/Plan: 58-year-old female with symptomatic radiographically severe bilateral hip osteoarthritis.  We revisited discussion of treatment options.  Hip replacement is likely in the future, but she does not feel ready for it yet which is completely reasonable.  She would like to continue with injections today.  Injection was performed to both hips.  We did briefly discuss surgery, expectations and recovery.  She can follow-up in 3 months or later for repeat injection, or for surgery discussion appointment whenever it suits her.    Ruddy Sanchez MD, [facility]  Phone [phone]  Fax [phone]

## 2024-03-06 NOTE — PROCEDURES
Risks and benefits of hip injection discussed with the patient, with risks including but not limited to pain and swelling at the injection site and/or within the hip joint, infection, elevation in blood pressure and/or glucose levels, facial flushing. After informed consent, the patient's right and left hips were marked, prepped with topical antiseptic, and locally anesthetized with skin refrigerant followed by injection of 1% lidocaine to create a skin wheal anteriorly at an insertion site a few fingerbreadths lateral to the femoral pulse, along the trajectory of the femoral neck.  Next, the area was re-prepped with topical antiseptic, and ultrasound guidance was performed to direct a 20 gauge spinal needle into the hip joint at the junction of the femoral head and neck, after which a mixture of 1mL 40mg/mL Kenalog, 2mL 1% lidocaine and 2mL 0.5% marcaine was injected while visualizing the fluid under ultrasound.  Confirmatory imaging was saved to the patient's chart.  A band-aid was applied.  The patient tolerated the procedure well.    Ruddy Sanchez MD, FAAOS  [facility]  Phone [phone]  Fax [phone]

## 2024-03-12 ENCOUNTER — PATIENT MESSAGE (OUTPATIENT)
Dept: ORTHOPEDICS CLINIC | Facility: CLINIC | Age: 59
End: 2024-03-12

## 2024-03-13 NOTE — TELEPHONE ENCOUNTER
From: Fozia Ivan  To: Ruddy Sanchez  Sent: 3/12/2024 8:39 PM CDT  Subject: Hip Replacement     Hi Dr. Sanchez,  Thank you for the time you spent answering all of my questions last week. I don’t believe the injections will get me through another year pain free as I am still having pain in my right hip after last weeks injection. After serious consideration, I believe the pros of getting a hip replacement sooner than later outweigh the cons. Can you please provide me with the steps required to move forward with getting a hip replacement? I appreciate your time.  Kind Regards,  Fozia Ivan

## 2024-03-29 ENCOUNTER — APPOINTMENT (OUTPATIENT)
Dept: PHYSICAL THERAPY | Age: 59
End: 2024-03-29
Attending: ORTHOPAEDIC SURGERY
Payer: COMMERCIAL

## 2024-04-03 ENCOUNTER — APPOINTMENT (OUTPATIENT)
Dept: PHYSICAL THERAPY | Age: 59
End: 2024-04-03
Attending: ORTHOPAEDIC SURGERY
Payer: COMMERCIAL

## 2024-04-05 ENCOUNTER — APPOINTMENT (OUTPATIENT)
Dept: PHYSICAL THERAPY | Age: 59
End: 2024-04-05
Attending: ORTHOPAEDIC SURGERY
Payer: COMMERCIAL

## 2024-04-10 ENCOUNTER — APPOINTMENT (OUTPATIENT)
Dept: PHYSICAL THERAPY | Age: 59
End: 2024-04-10
Attending: ORTHOPAEDIC SURGERY
Payer: COMMERCIAL

## 2024-04-12 ENCOUNTER — APPOINTMENT (OUTPATIENT)
Dept: PHYSICAL THERAPY | Age: 59
End: 2024-04-12
Attending: ORTHOPAEDIC SURGERY
Payer: COMMERCIAL

## 2024-04-15 ENCOUNTER — APPOINTMENT (OUTPATIENT)
Dept: PHYSICAL THERAPY | Age: 59
End: 2024-04-15
Attending: ORTHOPAEDIC SURGERY
Payer: COMMERCIAL

## 2024-04-17 ENCOUNTER — PATIENT MESSAGE (OUTPATIENT)
Dept: ORTHOPEDICS CLINIC | Facility: CLINIC | Age: 59
End: 2024-04-17

## 2024-04-17 DIAGNOSIS — M70.71 ILIOPSOAS BURSITIS OF RIGHT HIP: ICD-10-CM

## 2024-04-17 DIAGNOSIS — M16.11 PRIMARY OSTEOARTHRITIS OF RIGHT HIP: ICD-10-CM

## 2024-04-18 NOTE — TELEPHONE ENCOUNTER
From: Fozia Ivan  To: Ruddy Sanchez  Sent: 4/17/2024 6:37 PM CDT  Subject: Severe Hip Pain    Hi Dr. Sanchez,  I have an appointment with you in two weeks from Friday and I don’t think I can make it. I am in severe pain. I can’t walk and I can’t sleep. I was wondering if there’s anything you can do to fit me in sooner. The pain from my hip is so excruciating that my knee is killing me now. I would greatly appreciate it if you could squeeze me in anywhere sooner than two weeks from now. Your help is greatly appreciated.  Thank you so much,  Fozia Ivan

## 2024-04-19 RX ORDER — CYCLOBENZAPRINE HCL 10 MG
10 TABLET ORAL 2 TIMES DAILY PRN
Qty: 30 TABLET | Refills: 0 | Status: SHIPPED | OUTPATIENT
Start: 2024-04-19

## 2024-04-19 NOTE — TELEPHONE ENCOUNTER
Please see patient response~ she is requesting a prescription for Flexeril.     She had one from pain MD and feels it is helping with her increased hip pain.       Conrad Españay,     Thank you for your response. I apologize for being so persistent to get in sooner to see Dr. Sanchez; however my pain was so intolerable the other day I felt desperate. I am feeling a little better today. Yes, I am still taking the Celebrex and I am now taking Tylenol as well. I also started taking Flexeril the other day that my pain management doctor gave me and I think that’s been helping me. I am almost out of the Flexeril, and I’m wondering if you would be able to call in a script for me to get more?   The injection worked for a little bit, but not the full three months unfortunately. I am using patches and a heating pad as well. Hopefully an appointment will open up sooner. If not, I will have to wait until my scheduled appointment.   Please let me know if a script can be called into [facility] for more flexeril. Thank you again for your response. I appreciate it.     Kind Regards,  Fozia Ivan

## 2024-05-03 ENCOUNTER — TELEPHONE (OUTPATIENT)
Dept: ORTHOPEDICS CLINIC | Facility: CLINIC | Age: 59
End: 2024-05-03

## 2024-05-03 ENCOUNTER — OFFICE VISIT (OUTPATIENT)
Dept: ORTHOPEDICS CLINIC | Facility: CLINIC | Age: 59
End: 2024-05-03
Payer: COMMERCIAL

## 2024-05-03 VITALS — BODY MASS INDEX: 24.56 KG/M2 | HEIGHT: 64.5 IN | WEIGHT: 145.63 LBS

## 2024-05-03 DIAGNOSIS — M16.11 PRIMARY OSTEOARTHRITIS OF RIGHT HIP: ICD-10-CM

## 2024-05-03 DIAGNOSIS — M17.11 PRIMARY OSTEOARTHRITIS OF RIGHT KNEE: Primary | ICD-10-CM

## 2024-05-03 DIAGNOSIS — M17.11 PRIMARY OSTEOARTHRITIS OF RIGHT KNEE: ICD-10-CM

## 2024-05-03 DIAGNOSIS — M16.11 PRIMARY OSTEOARTHRITIS OF RIGHT HIP: Primary | ICD-10-CM

## 2024-05-03 PROCEDURE — 99214 OFFICE O/P EST MOD 30 MIN: CPT | Performed by: ORTHOPAEDIC SURGERY

## 2024-05-03 PROCEDURE — 20610 DRAIN/INJ JOINT/BURSA W/O US: CPT | Performed by: ORTHOPAEDIC SURGERY

## 2024-05-03 RX ORDER — TRIAMCINOLONE ACETONIDE 40 MG/ML
40 INJECTION, SUSPENSION INTRA-ARTICULAR; INTRAMUSCULAR ONCE
Status: COMPLETED | OUTPATIENT
Start: 2024-05-03 | End: 2024-05-03

## 2024-05-03 RX ADMIN — TRIAMCINOLONE ACETONIDE 40 MG: 40 INJECTION, SUSPENSION INTRA-ARTICULAR; INTRAMUSCULAR at 09:19:00

## 2024-05-03 NOTE — PROGRESS NOTES
SURGERY SCHEDULING SHEET    Fozia Ivan  10/20/1965  [id number]    Procedure: Right anterior total hip arthroplasty    CPT: 19200    Diagnosis: Right hip arthritis    Anesthesia: Choice    Length of Surgery: 2 hours    Disposition: Inpatient    Instruments: Christina LILLIAM    Assist: If case scheduled on Thurs/Fri, then Lion Huerta first assist. If case scheduled on Tues, then Aj Zulema ([phone]) first assist. If Aj unavailable, then please communicate to Lion Huerta/Tobin/Judd to cancel Lion's clinic for that day and have Lion first assist. If Lion unavailable, contact Deonte Joya for first assist. If jA and Deonte unavailable, then contact [facility] for first assist.    Pre-op Testing: CBC, CMP, PT/INR, PTT, TYPE AND SCREEN, and MRSA/MSSA THROUGH EDW PAT    Clearance: MEDICAL/PCP    Post op: 2 weeks postop appointment with Lion Huerta    Surgery date specifics: Likely wants to proceed around spring 2025    Ruddy Sanchez MD, FAAOS  [facility] Orthopedic Surgery  Phone: [phone]  Fax: [phone]

## 2024-05-03 NOTE — PROGRESS NOTES
[facility] Progress Note    Subjective: Very pleasant 58-year-old female returns for discussion of her right hip and her right knee.  She states that the last hip injection did help significantly, but symptoms have become coming back and have become more severe and activity limiting.  She also notices severe pain around her knee.  She feels that nonsurgical treatments are not providing as much relief as they did in the past.  Pain is becoming activity and lifestyle limiting.    Objective: Patient is very pleasant, appears comfortable and fit.  Passive range of motion of the right hip with internal rotation and external rotation is less than the left, and does elicit discomfort around the hip.  Right knee is nontender about the joint line.      Assessment/Plan: 58-year-old female with symptomatic radiographically severe right more than left hip osteoarthritis, also with mild right knee arthritis and pain.  From the hip standpoint, The patient has reasonably attempted nonsurgical treatments as mentioned above and remains limited in activities of daily living secondary to pain from hip arthritis.  I discussed risks and benefits of surgery, including but not limited to infection, blood clots, fracture, bleeding and need for blood transfusion, injury to blood vessels and nerves, loosening or failure of components, instability/dislocation, leg length discrepancy, continued pain, need for further intervention or revision surgery.  The patient understands this discussion and elects to proceed with hip replacement surgery.  She would actually like to proceed with this next spring as she has a number of activities coming up including a trip to [place] and a [employer] concert.  Binder was provided today.  Would plan for anterior approach.  From the knee standpoint, we did discuss possibility that this is related to her hip arthritis pain.  She did get some relief from the injection in the past and would like to  perform this today.  This was provided and can be repeated as often as every 3 months.  She would like to continue hip injections up until surgery for some relief.  She is scheduled for hip injection in another month.    Ruddy Sanchez MD, [facility]  [facility]  Phone [phone]  Fax [phone]

## 2024-05-03 NOTE — PROCEDURES
Risks and benefits of knee injection discussed with the patient, with risks including but not limited to pain and swelling at the injection site and/or within the knee joint, infection, elevation in blood pressure and/or glucose levels, facial flushing.  After informed consent, the patient's right knee was marked, locally anesthetized with skin refrigerant, prepped with topical antiseptic, and injected with a mixture of 1mL 40mg/mL Kenalog, 2mL 1% lidocaine and 2mL 0.5% marcaine through the inferolateral portal.  A band-aid was applied.  The patient tolerated the procedure well.    Ruddy Sanchez MD, FAAOS  [facility] Orthopedic Surgery  Phone [phone]  Fax [phone]

## 2024-05-03 NOTE — TELEPHONE ENCOUNTER
Date of Surgery:  9/12/2024    Post Op Appt:  9/25/2024    Case ID: [id number]     Wait List    Notes: She thinks she probably wants to schedule hip replacement for spring 2025 (but can schedule for this year instead if she would like - she has some trips coming up that she wasn't sure yet whether she wanted to do surgery before them) - when you talk to her let me know if she is looking for a sooner surgery date than we have available because I can look for a sooner available date too. Thanks Karlos             SURGERY SCHEDULING SHEET     Fozia Ivan  10/20/1965  [id number]     Procedure: Right anterior total hip arthroplasty     CPT: 38945     Diagnosis: Right hip arthritis     Anesthesia: Choice     Length of Surgery: 2 hours     Disposition: Inpatient     Instruments: Christina LILLIAM     Assist: If case scheduled on Thurs/Fri, then Lion Huerta first assist. If case scheduled on Tues, then Aj Poole ([phone]) first assist. If Aj unavailable, then please communicate to Lion Huerta/Tobin/Judd to cancel Lion's clinic for that day and have Lion first assist. If Lion unavailable, contact Deonte Joya for first assist. If Aj and Deonte unavailable, then contact [facility] for first assist.     Pre-op Testing: CBC, CMP, PT/INR, PTT, TYPE AND SCREEN, and MRSA/MSSA THROUGH EDW PAT     Clearance: MEDICAL/PCP     Post op: 2 weeks postop appointment with Lion Huerta     Surgery date specifics: Likely wants to proceed around spring 2025     Ruddy Sanchez MD, FAAOS  [facility] Orthopedic Surgery  Phone: [phone]  Fax: [phone]

## 2024-05-07 NOTE — TELEPHONE ENCOUNTER
Spoke with patient and we scheduled surgery and post op.    Patient will call back to go over pre operative procedures.    PCP CLEARANCE SENT

## 2024-06-05 ENCOUNTER — OFFICE VISIT (OUTPATIENT)
Dept: ORTHOPEDICS CLINIC | Facility: CLINIC | Age: 59
End: 2024-06-05
Payer: COMMERCIAL

## 2024-06-05 DIAGNOSIS — M16.0 PRIMARY OSTEOARTHRITIS OF BOTH HIPS: Primary | ICD-10-CM

## 2024-06-05 PROCEDURE — 20611 DRAIN/INJ JOINT/BURSA W/US: CPT | Performed by: ORTHOPAEDIC SURGERY

## 2024-06-05 RX ORDER — TRIAMCINOLONE ACETONIDE 40 MG/ML
80 INJECTION, SUSPENSION INTRA-ARTICULAR; INTRAMUSCULAR ONCE
Status: COMPLETED | OUTPATIENT
Start: 2024-06-05 | End: 2024-06-05

## 2024-06-05 RX ADMIN — TRIAMCINOLONE ACETONIDE 80 MG: 40 INJECTION, SUSPENSION INTRA-ARTICULAR; INTRAMUSCULAR at 08:38:00

## 2024-06-05 NOTE — PROCEDURES
Risks and benefits of hip injection discussed with the patient, with risks including but not limited to pain and swelling at the injection site and/or within the hip joint, infection, elevation in blood pressure and/or glucose levels, facial flushing. After informed consent, the patient's right and left hips were marked, prepped with topical antiseptic, and locally anesthetized with skin refrigerant followed by injection of 1% lidocaine to create a skin wheal anteriorly at an insertion site a few fingerbreadths lateral to the femoral pulse, along the trajectory of the femoral neck.  Next, the area was re-prepped with topical antiseptic, and ultrasound guidance was performed to direct a 20 gauge spinal needle into the hip joint at the junction of the femoral head and neck, after which a mixture of 1mL 40mg/mL Kenalog, 2mL 1% lidocaine and 2mL 0.5% marcaine was injected while visualizing the fluid under ultrasound.  Confirmatory imaging was saved to the patient's chart.  A band-aid was applied.  The patient tolerated the procedure well.    She will return for left hip injection in 3 months just prior to right hip replacement.  We do not need updated x-rays.    Ruddy Sanchez MD, FAAOS  [facility] Orthopedic Surgery  Phone [phone]  Fax [phone]

## 2024-06-23 ENCOUNTER — PATIENT MESSAGE (OUTPATIENT)
Dept: ORTHOPEDICS CLINIC | Facility: CLINIC | Age: 59
End: 2024-06-23

## 2024-06-23 DIAGNOSIS — M16.0 PRIMARY OSTEOARTHRITIS OF BOTH HIPS: ICD-10-CM

## 2024-06-24 RX ORDER — CELECOXIB 200 MG/1
200 CAPSULE ORAL 2 TIMES DAILY
Qty: 180 CAPSULE | Refills: 3 | Status: SHIPPED | OUTPATIENT
Start: 2024-06-24 | End: 2024-06-24

## 2024-06-24 RX ORDER — CELECOXIB 200 MG/1
200 CAPSULE ORAL 2 TIMES DAILY
Qty: 180 CAPSULE | Refills: 3 | Status: SHIPPED | OUTPATIENT
Start: 2024-06-24

## 2024-06-24 NOTE — TELEPHONE ENCOUNTER
Celecoxib    DOS: n/a  Last OV: 6/5/24  Last refill date: 5/28/24 #/refills: 180/0  Upcoming appt:   Future Appointments   Date Time Provider Department Center   8/23/2024  8:00 AM [facility] [address]   8/27/2024  7:00 AM Carley Resendiz APRN [facility] [address] [facility]   9/6/2024  8:40 AM Ruddy Sanchez MD [facility]   9/25/2024 11:40 AM Lion Huerta PA-C [facility]       Component      Latest Ref Rng 12/8/2023   Glucose      70 - 99 mg/dL 91    Sodium      136 - 145 mmol/L 141    Potassium      3.5 - 5.1 mmol/L 4.0    Chloride      98 - 112 mmol/L 109    Carbon Dioxide, Total      21.0 - 32.0 mmol/L 27.0    ANION GAP      0 - 18 mmol/L 5    BUN      9 - 23 mg/dL 14    CREATININE      0.55 - 1.02 mg/dL 0.70    CALCIUM      8.5 - 10.1 mg/dL 9.3    CALCULATED OSMOLALITY      275 - 295 mOsm/kg 292    EGFR      >=60 mL/min/1.73m2 101    AST (SGOT)      15 - 37 U/L 15    ALT (SGPT)      13 - 56 U/L 22    ALKALINE PHOSPHATASE      46 - 118 U/L 60    Total Bilirubin      0.1 - 2.0 mg/dL 0.5    PROTEIN, TOTAL      6.4 - 8.2 g/dL 6.6    Albumin      3.4 - 5.0 g/dL 3.9    Globulin      2.8 - 4.4 g/dL 2.7 (L)    A/G Ratio      1.0 - 2.0  1.4    Patient Fasting for CMP? Yes       Legend:  (L) Low

## 2024-06-24 NOTE — TELEPHONE ENCOUNTER
From: Fozia Ivan  To: Ruddy Sanchez  Sent: 6/23/2024 8:48 AM CDT  Subject: Celebrex    Hi Dr. Sanchez,    I hope you are having a nice summer so far. I don’t have any Celebrex refills available. When you have a feee moment could you please send a new 3 month script to [facility]. No rush my next auto delivery is scheduled for August 21.    Thank you so much!    Kind Regards,  Fozia Ivan

## 2024-06-26 ENCOUNTER — PATIENT MESSAGE (OUTPATIENT)
Dept: ORTHOPEDICS CLINIC | Facility: CLINIC | Age: 59
End: 2024-06-26

## 2024-06-26 DIAGNOSIS — M16.0 PRIMARY OSTEOARTHRITIS OF BOTH HIPS: Primary | ICD-10-CM

## 2024-06-27 RX ORDER — TRAMADOL HYDROCHLORIDE 50 MG/1
50 TABLET ORAL EVERY 8 HOURS PRN
Qty: 60 TABLET | Refills: 0 | Status: SHIPPED | OUTPATIENT
Start: 2024-06-27

## 2024-06-27 NOTE — TELEPHONE ENCOUNTER
Upcoming hip surgery: 9/12/24  LOV 6/5/24    Spoke with pt.    Right hip pain unmanageable, radiates to entire leg. Pain severe.  Knee feels like its going to \"break in half.\" Pt understands that is referred pain.  Celebrex helps a little, but is best of all the NSAIDs she has tried.  1 flexeril daily on days off work, makes her drowsy. Is not sure that even works.  3000 mg tylenol per day.  Ice/heat, exercise when able    Requesting something else for pain \"other than narcotics\" such as \"toradol injection\".  Message sent to pt to clarify if has tried tramadol, as it might be an option, and if she tolerated it alright.    1) states if Dr Sanchez prescribed tramadol she would use only on her days off. She is not comfortable using on work days.     2) asked what else she can do for work days, that  can recommend.   Advised may not be anything additional other than what she is already doing, other than topical, to balance her exercise so she remains strong but does not overuse her hip joint aggravating it worse.   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    [email or web address]

## 2024-06-27 NOTE — TELEPHONE ENCOUNTER
From: Fozia Ivan  To: Ruddy Sanchez  Sent: 6/26/2024 8:00 PM CDT  Subject: Question     Hi Dr. Sanchez,    I am in so much pain. My entire leg is hurting so much, from top to bottom and I can barely walk. I still have 11 weeks until my surgery. I am wondering if there is anything that you can give me to help with the pain, other than narcotics. Would a Toradol injection help for the short term? Please let me know your thoughts. If you have any other suggestions, I would appreciate hearing them.    Kind Regards,  Fozia Ivan

## 2024-07-17 ENCOUNTER — PATIENT MESSAGE (OUTPATIENT)
Dept: ORTHOPEDICS CLINIC | Facility: CLINIC | Age: 59
End: 2024-07-17

## 2024-07-17 DIAGNOSIS — M16.0 PRIMARY OSTEOARTHRITIS OF BOTH HIPS: ICD-10-CM

## 2024-07-26 NOTE — TELEPHONE ENCOUNTER
From: Fozia Ivan  To: Ruddy Sanchez  Sent: 7/17/2024 8:09 PM CDT  Subject: Question     Hi Dr. Sanchez,    I took the Tramadol once and it didn’t help me at all. The only thing it did was make me feel horrible the next day. I’m in so much pain. I don’t even know how I’m going to make it eight more weeks. Is there anything else that you can give me or do for me just to help relieve some of this pain until my surgery?    Thank you for your help.    Kind Regards,  Fozia Ivan

## 2024-07-26 NOTE — TELEPHONE ENCOUNTER
Pt is complaining of pain and asking if she can take Tramadol every 4 to 6 hours instead of q 8 as the instructions read.

## 2024-07-26 NOTE — TELEPHONE ENCOUNTER
Please see patient request for pain control~ She would like to take her Tramadol q 6 hours- please advise if contraindicated?     Celebrex BID    2 ES Tylenol TID    Tramadol currently BID~      Scheduled for RTHA on 09/12

## 2024-08-01 RX ORDER — TRAMADOL HYDROCHLORIDE 50 MG/1
50 TABLET ORAL EVERY 6 HOURS PRN
Qty: 60 TABLET | Refills: 0 | Status: SHIPPED | OUTPATIENT
Start: 2024-08-01

## 2024-08-01 NOTE — TELEPHONE ENCOUNTER
Per 7/26/24, Dr Sanchez approved pt to take every 6 hours (max) (see this encounter.) Pended rx for refill.     DOS: Rt hip arthroplasty scheduled for 9/12/24    Last OV: 6/5/24  Last refill date: 6/27/24     #/refills: 60/0  Upcoming appt: 9/6/24

## 2024-08-20 DIAGNOSIS — M16.0 PRIMARY OSTEOARTHRITIS OF BOTH HIPS: ICD-10-CM

## 2024-08-21 ENCOUNTER — LAB ENCOUNTER (OUTPATIENT)
Dept: LAB | Age: 59
End: 2024-08-21
Attending: ORTHOPAEDIC SURGERY
Payer: COMMERCIAL

## 2024-08-21 DIAGNOSIS — M16.11 PRIMARY OSTEOARTHRITIS OF RIGHT HIP: ICD-10-CM

## 2024-08-21 LAB
ALBUMIN SERPL-MCNC: 4.5 G/DL (ref 3.2–4.8)
ALBUMIN/GLOB SERPL: 2 {RATIO} (ref 1–2)
ALP LIVER SERPL-CCNC: 87 U/L
ALT SERPL-CCNC: 12 U/L
ANION GAP SERPL CALC-SCNC: 7 MMOL/L (ref 0–18)
ANTIBODY SCREEN: NEGATIVE
APTT PPP: 24.5 SECONDS (ref 23–36)
AST SERPL-CCNC: 14 U/L (ref ?–34)
BASOPHILS # BLD AUTO: 0.08 X10(3) UL (ref 0–0.2)
BASOPHILS NFR BLD AUTO: 1.3 %
BILIRUB SERPL-MCNC: 0.5 MG/DL (ref 0.3–1.2)
BUN BLD-MCNC: 15 MG/DL (ref 9–23)
CALCIUM BLD-MCNC: 9.8 MG/DL (ref 8.7–10.4)
CHLORIDE SERPL-SCNC: 109 MMOL/L (ref 98–112)
CO2 SERPL-SCNC: 26 MMOL/L (ref 21–32)
CREAT BLD-MCNC: 0.74 MG/DL
EGFRCR SERPLBLD CKD-EPI 2021: 94 ML/MIN/1.73M2 (ref 60–?)
EOSINOPHIL # BLD AUTO: 0.11 X10(3) UL (ref 0–0.7)
EOSINOPHIL NFR BLD AUTO: 1.7 %
ERYTHROCYTE [DISTWIDTH] IN BLOOD BY AUTOMATED COUNT: 13.8 %
GLOBULIN PLAS-MCNC: 2.3 G/DL (ref 2–3.5)
GLUCOSE BLD-MCNC: 97 MG/DL (ref 70–99)
HCT VFR BLD AUTO: 43.7 %
HGB BLD-MCNC: 14.3 G/DL
IMM GRANULOCYTES # BLD AUTO: 0.03 X10(3) UL (ref 0–1)
IMM GRANULOCYTES NFR BLD: 0.5 %
INR BLD: 1.03 (ref 0.8–1.2)
LYMPHOCYTES # BLD AUTO: 1.54 X10(3) UL (ref 1–4)
LYMPHOCYTES NFR BLD AUTO: 24.1 %
MCH RBC QN AUTO: 29.1 PG (ref 26–34)
MCHC RBC AUTO-ENTMCNC: 32.7 G/DL (ref 31–37)
MCV RBC AUTO: 88.8 FL
MONOCYTES # BLD AUTO: 0.57 X10(3) UL (ref 0.1–1)
MONOCYTES NFR BLD AUTO: 8.9 %
NEUTROPHILS # BLD AUTO: 4.05 X10 (3) UL (ref 1.5–7.7)
NEUTROPHILS # BLD AUTO: 4.05 X10(3) UL (ref 1.5–7.7)
NEUTROPHILS NFR BLD AUTO: 63.5 %
OSMOLALITY SERPL CALC.SUM OF ELEC: 295 MOSM/KG (ref 275–295)
PLATELET # BLD AUTO: 254 10(3)UL (ref 150–450)
POTASSIUM SERPL-SCNC: 4 MMOL/L (ref 3.5–5.1)
PROT SERPL-MCNC: 6.8 G/DL (ref 5.7–8.2)
PROTHROMBIN TIME: 13.5 SECONDS (ref 11.6–14.8)
RBC # BLD AUTO: 4.92 X10(6)UL
RH BLOOD TYPE: NEGATIVE
SODIUM SERPL-SCNC: 142 MMOL/L (ref 136–145)
WBC # BLD AUTO: 6.4 X10(3) UL (ref 4–11)

## 2024-08-21 PROCEDURE — 87081 CULTURE SCREEN ONLY: CPT

## 2024-08-21 PROCEDURE — 86901 BLOOD TYPING SEROLOGIC RH(D): CPT

## 2024-08-21 PROCEDURE — 86900 BLOOD TYPING SEROLOGIC ABO: CPT

## 2024-08-21 PROCEDURE — 85025 COMPLETE CBC W/AUTO DIFF WBC: CPT

## 2024-08-21 PROCEDURE — 80053 COMPREHEN METABOLIC PANEL: CPT

## 2024-08-21 PROCEDURE — 36415 COLL VENOUS BLD VENIPUNCTURE: CPT

## 2024-08-21 PROCEDURE — 85610 PROTHROMBIN TIME: CPT

## 2024-08-21 PROCEDURE — 86850 RBC ANTIBODY SCREEN: CPT

## 2024-08-21 PROCEDURE — 85730 THROMBOPLASTIN TIME PARTIAL: CPT

## 2024-08-21 RX ORDER — TRAMADOL HYDROCHLORIDE 50 MG/1
50 TABLET ORAL EVERY 6 HOURS PRN
Qty: 60 TABLET | Refills: 0 | Status: SHIPPED | OUTPATIENT
Start: 2024-08-21

## 2024-08-21 NOTE — TELEPHONE ENCOUNTER
Tramadol    DOS: 9/12/24 Right anterior total hip  Last OV: 6/5/24  Last refill date: 8/1/24 #/refills: 60/0  Upcoming appt:   Future Appointments   Date Time Provider Department Center   8/30/2024  9:00 AM [id number]   9/6/2024  8:40 AM Ruddy Sanchez MD [facility]   9/25/2024 11:40 AM Lion Huerta PA-C [facility]   10/23/2024  3:00 PM Ruddy Sanchez MD [facility]

## 2024-08-26 PROBLEM — Z12.11 SPECIAL SCREENING FOR MALIGNANT NEOPLASM OF COLON: Status: RESOLVED | Noted: 2022-02-11 | Resolved: 2024-08-26

## 2024-08-26 PROBLEM — M16.11 PRIMARY OSTEOARTHRITIS OF RIGHT HIP: Status: ACTIVE | Noted: 2024-08-26

## 2024-08-26 PROBLEM — M25.551 HIP PAIN, RIGHT: Status: RESOLVED | Noted: 2022-06-29 | Resolved: 2024-08-26

## 2024-08-26 NOTE — PROGRESS NOTES
[facility]  PRE OP RISK ASSESSMENT    REASON FOR CONSULT: Pre-op risk assessment for surgical procedure right anterior total hip arthroplasty planned for 9/12/24 with Dr Sanchez .    REQUESTING PHYSICIAN: Dr Sanchez    CHIEF COMPLAINT:   Chief Complaint   Patient presents with    Pre-Op Exam     Rm 11, VS       HISTORY OF PRESENT ILLNESS:   The patient is a 58 year old  female  presents for pre procedure evaluation  Ms Ivan has been following with Dr Sanchez for progressive right hip pain.  She has failed conservative treatment including injections and physical therapy.  The pain is now impacting her activity of daily living including exercise.  She has opted to proceed with above.     Dyslipidemia. Stable  LDL 12/23 127  atorvastatin 10mg  she is taking more regularly.         EXERCISE TOLERANCE: Ms Ivan is active.   Her exercise/activity is limited by hip osteoarthritis.  She is able to walk several blocks without chest pain or SOB.      PREVIOUS SURGERY:   Patient has had previous surgery without incident.     Past Medical History:    Past Medical History:    ADHD (attention deficit hyperactivity disorder)    Arthritis    Back problem    Genital warts    Hearing loss    High cholesterol    Visual impairment    contact lenses         Past Surgical History:    Past Surgical History:   Procedure Laterality Date    Laser surgery of cervix      genital warts removal    Thompson localization wire 1 site right (cpt=19281)      CYST     Other surgical history  age 7    urethra surgery    Tubal ligation  2005       Current Medications:    Current Outpatient Medications   Medication Sig Dispense Refill    Multiple Vitamins Oral Tab Take 1 tablet by mouth daily. Vitamin D. Vitamin C.      acetaminophen 500 MG Oral Tab Take 1 tablet (500 mg total) by mouth every 6 (six) hours as needed for Pain.      traMADol 50 MG Oral Tab Take 1 tablet (50 mg total) by mouth every 6 (six) hours as needed for Pain. 60 tablet 0     celecoxib 200 MG Oral Cap Take 1 capsule (200 mg total) by mouth 2 (two) times daily. 180 capsule 3    atorvastatin 10 MG Oral Tab Take 1 tablet (10 mg total) by mouth daily. 90 tablet 3        Allergies:    Allergies as of 08/27/2024 - Review Complete 08/27/2024   Allergen Reaction Noted    Cephalexin HIVES 04/04/2022    Penicillins UNKNOWN and RASH 09/14/2012       SOCIAL HISTORY:   Social History     Socioeconomic History    Marital status:    Tobacco Use    Smoking status: Former     Current packs/day: 1.00     Types: Cigarettes    Smokeless tobacco: Never    Tobacco comments:     Updated 5/3/24   Vaping Use    Vaping status: Never Used   Substance and Sexual Activity    Alcohol use: Yes     Alcohol/week: 1.0 standard drink of alcohol     Types: 1 Glasses of wine per week     Comment: occasionally    Drug use: No    Sexual activity: Yes     Partners: Male     Birth control/protection: Tubal Ligation            FAMILY HISTORY:   Family History   Problem Relation Age of Onset    Heart Disorder Father         stent placed    Lipids Mother     Colon Polyps Mother     Cancer Maternal Grandmother         lymphoma    Heart Attack Maternal Grandfather     Heart Attack Paternal Grandfather         REVIEW OF SYSTEMS:    GENERAL: feels well otherwise  HEENT: denies blurred vision or double vision denies nasal congestion  LUNGS: denies shortness of breath with exertion  CARDIOVASCULAR: denies chest pain on exertion  GI: denies abdominal pain, denies heartburn  some constipation from tramadol.    : denies dysuria, urgency, frequency, hematuria  MUSCULOSKELETAL: denies back pain  NEURO: denies headaches    PHYSICAL EXAM:  /84   Pulse 84   Temp 98.1 °F (36.7 °C)   Ht 5' 3.58\" (1.615 m)   Wt 138 lb 8 oz (62.8 kg)   LMP  (LMP Unknown)   SpO2 99%   BMI 24.09 kg/m²    GENERAL: well developed, well nourished,in no apparent distress  HEENT: atraumatic, normocephalic,ears and throat are clear  EYES:ARELY  EOMI, conjunctiva are clear  NECK: supple,no adenopathy  CHEST: no chest tenderness  LUNGS: clear to auscultation  CARDIO: RRR without murmur  GI: good BS's,no masses, HSM or tenderness  EXTREMITIES: no edema  NEURO: Oriented times three,    LABS:  Reviewed.    EKG: Normal rate normal rhythm  no acute changes.       ASSESSMENT AND PLAN:    Encounter Diagnoses   Name Primary?    Primary osteoarthritis of right hip Yes    Dyslipidemia  atorvastatin.       Pre-op exam      No orders of the defined types were placed in this encounter.    Ms Ivan has had surgery before without incident.  There are no contraindications proceeding with surgery.  Please feel free to call with questions or concerns.      Meds & Refills for this Visit:  Requested Prescriptions      No prescriptions requested or ordered in this encounter       Imaging & Consults:  ELECTROCARDIOGRAM, COMPLETE    No follow-ups on file.  There are no Patient Instructions on file for this visit.

## 2024-08-26 NOTE — H&P (VIEW-ONLY)
[facility]  PRE OP RISK ASSESSMENT    REASON FOR CONSULT: Pre-op risk assessment for surgical procedure right anterior total hip arthroplasty planned for 9/12/24 with Dr Sanchez .    REQUESTING PHYSICIAN: Dr Sanchez    CHIEF COMPLAINT:   Chief Complaint   Patient presents with    Pre-Op Exam     Rm 11, VS       HISTORY OF PRESENT ILLNESS:   The patient is a 58 year old  female  presents for pre procedure evaluation  Ms Ivan has been following with Dr Sanchez for progressive right hip pain.  She has failed conservative treatment including injections and physical therapy.  The pain is now impacting her activity of daily living including exercise.  She has opted to proceed with above.     Dyslipidemia. Stable  LDL 12/23 127  atorvastatin 10mg  she is taking more regularly.         EXERCISE TOLERANCE: Ms Ivan is active.   Her exercise/activity is limited by hip osteoarthritis.  She is able to walk several blocks without chest pain or SOB.      PREVIOUS SURGERY:   Patient has had previous surgery without incident.     Past Medical History:    Past Medical History:    ADHD (attention deficit hyperactivity disorder)    Arthritis    Back problem    Genital warts    Hearing loss    High cholesterol    Visual impairment    contact lenses         Past Surgical History:    Past Surgical History:   Procedure Laterality Date    Laser surgery of cervix      genital warts removal    Thompson localization wire 1 site right (cpt=19281)      CYST     Other surgical history  age 7    urethra surgery    Tubal ligation  2005       Current Medications:    Current Outpatient Medications   Medication Sig Dispense Refill    Multiple Vitamins Oral Tab Take 1 tablet by mouth daily. Vitamin D. Vitamin C.      acetaminophen 500 MG Oral Tab Take 1 tablet (500 mg total) by mouth every 6 (six) hours as needed for Pain.      traMADol 50 MG Oral Tab Take 1 tablet (50 mg total) by mouth every 6 (six) hours as needed for Pain. 60 tablet 0     celecoxib 200 MG Oral Cap Take 1 capsule (200 mg total) by mouth 2 (two) times daily. 180 capsule 3    atorvastatin 10 MG Oral Tab Take 1 tablet (10 mg total) by mouth daily. 90 tablet 3        Allergies:    Allergies as of 08/27/2024 - Review Complete 08/27/2024   Allergen Reaction Noted    Cephalexin HIVES 04/04/2022    Penicillins UNKNOWN and RASH 09/14/2012       SOCIAL HISTORY:   Social History     Socioeconomic History    Marital status:    Tobacco Use    Smoking status: Former     Current packs/day: 1.00     Types: Cigarettes    Smokeless tobacco: Never    Tobacco comments:     Updated 5/3/24   Vaping Use    Vaping status: Never Used   Substance and Sexual Activity    Alcohol use: Yes     Alcohol/week: 1.0 standard drink of alcohol     Types: 1 Glasses of wine per week     Comment: occasionally    Drug use: No    Sexual activity: Yes     Partners: Male     Birth control/protection: Tubal Ligation            FAMILY HISTORY:   Family History   Problem Relation Age of Onset    Heart Disorder Father         stent placed    Lipids Mother     Colon Polyps Mother     Cancer Maternal Grandmother         lymphoma    Heart Attack Maternal Grandfather     Heart Attack Paternal Grandfather         REVIEW OF SYSTEMS:    GENERAL: feels well otherwise  HEENT: denies blurred vision or double vision denies nasal congestion  LUNGS: denies shortness of breath with exertion  CARDIOVASCULAR: denies chest pain on exertion  GI: denies abdominal pain, denies heartburn  some constipation from tramadol.    : denies dysuria, urgency, frequency, hematuria  MUSCULOSKELETAL: denies back pain  NEURO: denies headaches    PHYSICAL EXAM:  /84   Pulse 84   Temp 98.1 °F (36.7 °C)   Ht 5' 3.58\" (1.615 m)   Wt 138 lb 8 oz (62.8 kg)   LMP  (LMP Unknown)   SpO2 99%   BMI 24.09 kg/m²    GENERAL: well developed, well nourished,in no apparent distress  HEENT: atraumatic, normocephalic,ears and throat are clear  EYES:ARELY  EOMI, conjunctiva are clear  NECK: supple,no adenopathy  CHEST: no chest tenderness  LUNGS: clear to auscultation  CARDIO: RRR without murmur  GI: good BS's,no masses, HSM or tenderness  EXTREMITIES: no edema  NEURO: Oriented times three,    LABS:  Reviewed.    EKG: Normal rate normal rhythm  no acute changes.       ASSESSMENT AND PLAN:    Encounter Diagnoses   Name Primary?    Primary osteoarthritis of right hip Yes    Dyslipidemia  atorvastatin.       Pre-op exam      No orders of the defined types were placed in this encounter.    Ms Ivan has had surgery before without incident.  There are no contraindications proceeding with surgery.  Please feel free to call with questions or concerns.      Meds & Refills for this Visit:  Requested Prescriptions      No prescriptions requested or ordered in this encounter       Imaging & Consults:  ELECTROCARDIOGRAM, COMPLETE    No follow-ups on file.  There are no Patient Instructions on file for this visit.

## 2024-08-27 ENCOUNTER — OFFICE VISIT (OUTPATIENT)
Dept: INTERNAL MEDICINE CLINIC | Facility: CLINIC | Age: 59
End: 2024-08-27
Payer: COMMERCIAL

## 2024-08-27 VITALS
HEART RATE: 84 BPM | SYSTOLIC BLOOD PRESSURE: 120 MMHG | TEMPERATURE: 98 F | OXYGEN SATURATION: 99 % | WEIGHT: 138.5 LBS | DIASTOLIC BLOOD PRESSURE: 84 MMHG | HEIGHT: 63.58 IN | BODY MASS INDEX: 24.24 KG/M2

## 2024-08-27 DIAGNOSIS — Z01.818 PRE-OP EXAM: ICD-10-CM

## 2024-08-27 DIAGNOSIS — M16.11 PRIMARY OSTEOARTHRITIS OF RIGHT HIP: Primary | ICD-10-CM

## 2024-08-27 DIAGNOSIS — E78.5 DYSLIPIDEMIA: ICD-10-CM

## 2024-08-27 LAB
ATRIAL RATE: 68 BPM
P AXIS: 57 DEGREES
P-R INTERVAL: 140 MS
Q-T INTERVAL: 376 MS
QRS DURATION: 74 MS
QTC CALCULATION (BEZET): 399 MS
R AXIS: 25 DEGREES
T AXIS: 48 DEGREES
VENTRICULAR RATE: 68 BPM

## 2024-08-27 PROCEDURE — 93000 ELECTROCARDIOGRAM COMPLETE: CPT | Performed by: NURSE PRACTITIONER

## 2024-08-27 PROCEDURE — 99214 OFFICE O/P EST MOD 30 MIN: CPT | Performed by: NURSE PRACTITIONER

## 2024-08-27 RX ORDER — MULTIVITAMIN WITH IRON
1 TABLET ORAL DAILY
COMMUNITY

## 2024-08-27 RX ORDER — ACETAMINOPHEN 500 MG
500 TABLET ORAL EVERY 6 HOURS PRN
COMMUNITY

## 2024-08-27 NOTE — PROGRESS NOTES
Barcode printed. Temporarily placard signed and completed by pt and provider. Pt will take this to [facility]mv. Copy sent to scanning.

## 2024-08-29 ENCOUNTER — TELEPHONE (OUTPATIENT)
Dept: ORTHOPEDICS CLINIC | Facility: CLINIC | Age: 59
End: 2024-08-29

## 2024-08-29 DIAGNOSIS — M16.11 PRIMARY OSTEOARTHRITIS OF RIGHT HIP: Primary | ICD-10-CM

## 2024-08-30 ENCOUNTER — TELEPHONE (OUTPATIENT)
Dept: PHYSICAL MEDICINE AND REHAB | Facility: CLINIC | Age: 59
End: 2024-08-30

## 2024-08-30 ENCOUNTER — OFFICE VISIT (OUTPATIENT)
Dept: PHYSICAL MEDICINE AND REHAB | Facility: CLINIC | Age: 59
End: 2024-08-30
Payer: COMMERCIAL

## 2024-08-30 DIAGNOSIS — M47.816 LUMBAR FACET ARTHROPATHY: Primary | ICD-10-CM

## 2024-08-30 PROCEDURE — 99214 OFFICE O/P EST MOD 30 MIN: CPT | Performed by: PHYSICAL MEDICINE & REHABILITATION

## 2024-08-30 NOTE — PROGRESS NOTES
Progress note    C/C:   Chief Complaint   Patient presents with    Follow - Up     INJ: 12/29/2023 pt comes in to f/u on Bilateral L3, L4, L5 medial nerve branch neurotomies. Admits 90% improvement for 7 months. Presents with midline low back burning/stabbing pain that radiates down R leg. Admits T/N down R leg. Admits weakness. Rates pain 10/10. She is scheduled to have a R hip replacement in 2 weeks with Dr. Sanchez. Takes Celebrex 200mg BID, Tylenol qDaily, Tramadol 50mg QID PRN.      HPI: 58-year-old female presents for follow-up.  Increasing low back pain over the past 2 weeks.  Back was otherwise doing well. Has back, right groin and thigh pain to the knee. Has right hip LILLIAM scheduled in 2 weeks. Going to have left hip joint steroid injection next week. Localizes low back pain across the waistline.     Pertinent allergies:   Allergies   Allergen Reactions    Cephalexin HIVES    Penicillins UNKNOWN and RASH        Physical exam:     Antalgic gait    Lumbar spine exam:    No skin rash lumbar/upper sacral region  No pain with lumbar flexion. + pain with lumbar extension.  mild tenderness to palpation bilateral lumbar paraspinals. No ttp bilateral PSIS.  5/5 LE strength b/l   SILT b/l LE  2/4 quad, gastrocs reflexes b/l  Facet loading + b/l    Imaging: No new imaging to review    Assessment and plan  Lumbar facet arthropathy  Severe osteoarthritis of both hips  ADHD    She will have her right hip replaced, and afterwards if still having significant back pain despite postoperative physical therapy we should consider proceeding with repeating bilateral L3, L4, L5 medial branch radiofrequency neurotomies under fluoroscopy, IV conscious sedation for situational anxiety.  She is aware of the risks of procedure, including bleeding, infection, nerve injury, HA.  We will get her scheduled for mid November to allow time for her to recover from right hip LILLIAM, and if back pain remains significant we will proceed as  scheduled.    Vinod Ortega DO  Physical Medicine and Rehabilitation  [facility]

## 2024-08-30 NOTE — TELEPHONE ENCOUNTER
Initiated authorization for Bilateral L3, L4, L5 medial branch radiofrequency ablation under fluorsocopy, IVCS. Schedule in November. CPT/HCPCS 89765-76, [id number] 4 dx:M47.816 to be done at [facility] with [facility]    Clinicals faxed/uploaded to [facility]  Status: Pending  Decision ID #: [id number]  Tracking # [id number]

## 2024-09-04 DIAGNOSIS — M16.0 PRIMARY OSTEOARTHRITIS OF BOTH HIPS: ICD-10-CM

## 2024-09-05 ENCOUNTER — TELEPHONE (OUTPATIENT)
Dept: ORTHOPEDICS CLINIC | Facility: CLINIC | Age: 59
End: 2024-09-05

## 2024-09-05 RX ORDER — TRAMADOL HYDROCHLORIDE 50 MG/1
50 TABLET ORAL EVERY 6 HOURS PRN
Qty: 30 TABLET | Refills: 0 | Status: SHIPPED | OUTPATIENT
Start: 2024-09-05

## 2024-09-05 NOTE — TELEPHONE ENCOUNTER
Patient returned her call to verify that she will be coming for her LT Hip injection from previous [employer] message.

## 2024-09-05 NOTE — TELEPHONE ENCOUNTER
Called patient to confirm if she was coming in to appointment tomorrow as the appointment is scheduled for hips.    Right hip replacement is scheduled for 9/12/24     Would like to confirm if appointment is for left hip injection . Last injection done 6/5/24    My chart sent to patient

## 2024-09-05 NOTE — TELEPHONE ENCOUNTER
Tramadol    DOS: 9/12/24 Right anterior total hip arthroplasty   Last OV: 6/5/24  Last refill date: 8/21/24 #/refills: 60/0  Upcoming appt:   Future Appointments   Date Time Provider Department Center   9/6/2024  8:40 AM Ruddy Sanchez MD [facility]   9/25/2024 11:40 AM Lion Huerta PA-C [facility]   10/23/2024  3:00 PM Ruddy Sanchez MD [facility]     Patient requested refill

## 2024-09-06 ENCOUNTER — OFFICE VISIT (OUTPATIENT)
Dept: ORTHOPEDICS CLINIC | Facility: CLINIC | Age: 59
End: 2024-09-06
Payer: COMMERCIAL

## 2024-09-06 DIAGNOSIS — M16.12 PRIMARY OSTEOARTHRITIS OF LEFT HIP: Primary | ICD-10-CM

## 2024-09-06 PROCEDURE — 20611 DRAIN/INJ JOINT/BURSA W/US: CPT | Performed by: ORTHOPAEDIC SURGERY

## 2024-09-06 RX ORDER — TRIAMCINOLONE ACETONIDE 40 MG/ML
40 INJECTION, SUSPENSION INTRA-ARTICULAR; INTRAMUSCULAR ONCE
Status: COMPLETED | OUTPATIENT
Start: 2024-09-06 | End: 2024-09-06

## 2024-09-06 RX ADMIN — TRIAMCINOLONE ACETONIDE 40 MG: 40 INJECTION, SUSPENSION INTRA-ARTICULAR; INTRAMUSCULAR at 09:14:00

## 2024-09-06 NOTE — PROCEDURES
Risks and benefits of hip injection discussed with the patient, with risks including but not limited to pain and swelling at the injection site and/or within the hip joint, infection, elevation in blood pressure and/or glucose levels, facial flushing. After informed consent, the patient's left hip was marked, prepped with topical antiseptic, and locally anesthetized with skin refrigerant followed by injection of 1% lidocaine to create a skin wheal anteriorly at an insertion site a few fingerbreadths lateral to the femoral pulse, along the trajectory of the femoral neck.  Next, the area was re-prepped with topical antiseptic, and ultrasound guidance was performed to direct a 20 gauge spinal needle into the hip joint at the junction of the femoral head and neck, after which a mixture of 1mL 40mg/mL Kenalog, 2mL 1% lidocaine and 2mL 0.5% marcaine was injected while visualizing the fluid under ultrasound.  Confirmatory imaging was saved to the patient's chart.  A band-aid was applied.  The patient tolerated the procedure well.    Ruddy Sanchez MD, [facility]  [facility]  Phone [phone]  Fax [phone]

## 2024-09-09 ENCOUNTER — TELEPHONE (OUTPATIENT)
Dept: PHYSICAL THERAPY | Age: 59
End: 2024-09-09

## 2024-09-09 ENCOUNTER — ORDER TRANSCRIPTION (OUTPATIENT)
Dept: PHYSICAL THERAPY | Facility: HOSPITAL | Age: 59
End: 2024-09-09

## 2024-09-09 ENCOUNTER — TELEPHONE (OUTPATIENT)
Dept: PHYSICAL THERAPY | Facility: HOSPITAL | Age: 59
End: 2024-09-09

## 2024-09-09 DIAGNOSIS — M16.11 PRIMARY OSTEOARTHRITIS OF RIGHT HIP: Primary | ICD-10-CM

## 2024-09-10 ENCOUNTER — TELEPHONE (OUTPATIENT)
Dept: ORTHOPEDICS CLINIC | Facility: CLINIC | Age: 59
End: 2024-09-10

## 2024-09-10 DIAGNOSIS — M16.0 PRIMARY OSTEOARTHRITIS OF BOTH HIPS: Primary | ICD-10-CM

## 2024-09-11 ENCOUNTER — ANESTHESIA EVENT (OUTPATIENT)
Dept: SURGERY | Facility: HOSPITAL | Age: 59
End: 2024-09-11
Payer: COMMERCIAL

## 2024-09-12 ENCOUNTER — APPOINTMENT (OUTPATIENT)
Dept: GENERAL RADIOLOGY | Facility: HOSPITAL | Age: 59
End: 2024-09-12
Attending: PHYSICIAN ASSISTANT
Payer: COMMERCIAL

## 2024-09-12 ENCOUNTER — APPOINTMENT (OUTPATIENT)
Dept: GENERAL RADIOLOGY | Facility: HOSPITAL | Age: 59
End: 2024-09-12
Attending: ORTHOPAEDIC SURGERY
Payer: COMMERCIAL

## 2024-09-12 ENCOUNTER — HOSPITAL ENCOUNTER (OUTPATIENT)
Facility: HOSPITAL | Age: 59
Discharge: HOME HEALTH | End: 2024-09-13
Attending: ORTHOPAEDIC SURGERY | Admitting: ORTHOPAEDIC SURGERY
Payer: COMMERCIAL

## 2024-09-12 ENCOUNTER — ANESTHESIA (OUTPATIENT)
Dept: SURGERY | Facility: HOSPITAL | Age: 59
End: 2024-09-12
Payer: COMMERCIAL

## 2024-09-12 DIAGNOSIS — M16.0 PRIMARY OSTEOARTHRITIS OF BOTH HIPS: Primary | ICD-10-CM

## 2024-09-12 DIAGNOSIS — M16.11 PRIMARY OSTEOARTHRITIS OF RIGHT HIP: ICD-10-CM

## 2024-09-12 LAB — B-HCG UR QL: NEGATIVE

## 2024-09-12 PROCEDURE — 76000 FLUOROSCOPY <1 HR PHYS/QHP: CPT | Performed by: ORTHOPAEDIC SURGERY

## 2024-09-12 PROCEDURE — 0SR90JZ REPLACEMENT OF RIGHT HIP JOINT WITH SYNTHETIC SUBSTITUTE, OPEN APPROACH: ICD-10-PCS | Performed by: ORTHOPAEDIC SURGERY

## 2024-09-12 PROCEDURE — 27130 TOTAL HIP ARTHROPLASTY: CPT | Performed by: ORTHOPAEDIC SURGERY

## 2024-09-12 PROCEDURE — 72170 X-RAY EXAM OF PELVIS: CPT | Performed by: PHYSICIAN ASSISTANT

## 2024-09-12 DEVICE — IMPLANTABLE DEVICE
Type: IMPLANTABLE DEVICE | Site: HIP | Status: FUNCTIONAL
Brand: BIOLOX® OPTION

## 2024-09-12 DEVICE — IMPLANTABLE DEVICE
Type: IMPLANTABLE DEVICE | Site: HIP | Status: FUNCTIONAL
Brand: G7® ACETABULAR SYSTEM

## 2024-09-12 DEVICE — IMPLANTABLE DEVICE
Type: IMPLANTABLE DEVICE | Site: HIP | Status: FUNCTIONAL
Brand: ECHO BI-METRIC MICROPLASTY HIP SYSTEM

## 2024-09-12 DEVICE — IMPLANTABLE DEVICE: Type: IMPLANTABLE DEVICE | Site: HIP | Status: FUNCTIONAL

## 2024-09-12 DEVICE — IMPLANTABLE DEVICE
Type: IMPLANTABLE DEVICE | Site: HIP | Status: FUNCTIONAL
Brand: BIOLOX® DELTA OPTION

## 2024-09-12 DEVICE — IMPLANTABLE DEVICE
Type: IMPLANTABLE DEVICE | Site: HIP | Status: FUNCTIONAL
Brand: G7® VIVACIT-E®

## 2024-09-12 RX ORDER — ACETAMINOPHEN 325 MG/1
650 TABLET ORAL ONCE
Status: COMPLETED | OUTPATIENT
Start: 2024-09-12 | End: 2024-09-12

## 2024-09-12 RX ORDER — TRAMADOL HYDROCHLORIDE 50 MG/1
50 TABLET ORAL EVERY 6 HOURS SCHEDULED
Status: DISCONTINUED | OUTPATIENT
Start: 2024-09-12 | End: 2024-09-13

## 2024-09-12 RX ORDER — METOCLOPRAMIDE HYDROCHLORIDE 5 MG/ML
INJECTION INTRAMUSCULAR; INTRAVENOUS AS NEEDED
Status: DISCONTINUED | OUTPATIENT
Start: 2024-09-12 | End: 2024-09-12 | Stop reason: SURG

## 2024-09-12 RX ORDER — KETAMINE HYDROCHLORIDE 50 MG/ML
INJECTION, SOLUTION INTRAMUSCULAR; INTRAVENOUS AS NEEDED
Status: DISCONTINUED | OUTPATIENT
Start: 2024-09-12 | End: 2024-09-12 | Stop reason: SURG

## 2024-09-12 RX ORDER — DIPHENHYDRAMINE HCL 25 MG
25 CAPSULE ORAL EVERY 4 HOURS PRN
Status: DISCONTINUED | OUTPATIENT
Start: 2024-09-12 | End: 2024-09-13

## 2024-09-12 RX ORDER — DIPHENHYDRAMINE HYDROCHLORIDE 50 MG/ML
25 INJECTION INTRAMUSCULAR; INTRAVENOUS ONCE AS NEEDED
Status: ACTIVE | OUTPATIENT
Start: 2024-09-12 | End: 2024-09-12

## 2024-09-12 RX ORDER — DEXAMETHASONE SODIUM PHOSPHATE 4 MG/ML
VIAL (ML) INJECTION AS NEEDED
Status: DISCONTINUED | OUTPATIENT
Start: 2024-09-12 | End: 2024-09-12 | Stop reason: SURG

## 2024-09-12 RX ORDER — ACETAMINOPHEN 325 MG/1
TABLET ORAL
Status: COMPLETED
Start: 2024-09-12 | End: 2024-09-12

## 2024-09-12 RX ORDER — HYDROMORPHONE HYDROCHLORIDE 1 MG/ML
INJECTION, SOLUTION INTRAMUSCULAR; INTRAVENOUS; SUBCUTANEOUS
Status: COMPLETED
Start: 2024-09-12 | End: 2024-09-12

## 2024-09-12 RX ORDER — ACETAMINOPHEN 500 MG
1000 TABLET ORAL ONCE
Status: DISCONTINUED | OUTPATIENT
Start: 2024-09-12 | End: 2024-09-12 | Stop reason: HOSPADM

## 2024-09-12 RX ORDER — BISACODYL 10 MG
10 SUPPOSITORY, RECTAL RECTAL
Status: DISCONTINUED | OUTPATIENT
Start: 2024-09-12 | End: 2024-09-13

## 2024-09-12 RX ORDER — ATORVASTATIN CALCIUM 10 MG/1
10 TABLET, FILM COATED ORAL DAILY
Status: DISCONTINUED | OUTPATIENT
Start: 2024-09-12 | End: 2024-09-13

## 2024-09-12 RX ORDER — PROCHLORPERAZINE EDISYLATE 5 MG/ML
5 INJECTION INTRAMUSCULAR; INTRAVENOUS EVERY 8 HOURS PRN
Status: DISCONTINUED | OUTPATIENT
Start: 2024-09-12 | End: 2024-09-13

## 2024-09-12 RX ORDER — SODIUM CHLORIDE, SODIUM LACTATE, POTASSIUM CHLORIDE, CALCIUM CHLORIDE 600; 310; 30; 20 MG/100ML; MG/100ML; MG/100ML; MG/100ML
INJECTION, SOLUTION INTRAVENOUS CONTINUOUS
Status: DISCONTINUED | OUTPATIENT
Start: 2024-09-12 | End: 2024-09-13

## 2024-09-12 RX ORDER — TRANEXAMIC ACID 10 MG/ML
1000 INJECTION, SOLUTION INTRAVENOUS ONCE
Status: COMPLETED | OUTPATIENT
Start: 2024-09-12 | End: 2024-09-12

## 2024-09-12 RX ORDER — BUPIVACAINE HYDROCHLORIDE 7.5 MG/ML
INJECTION, SOLUTION INTRASPINAL AS NEEDED
Status: DISCONTINUED | OUTPATIENT
Start: 2024-09-12 | End: 2024-09-12 | Stop reason: SURG

## 2024-09-12 RX ORDER — DEXAMETHASONE SODIUM PHOSPHATE 10 MG/ML
8 INJECTION, SOLUTION INTRAMUSCULAR; INTRAVENOUS ONCE
Status: COMPLETED | OUTPATIENT
Start: 2024-09-13 | End: 2024-09-13

## 2024-09-12 RX ORDER — KETOROLAC TROMETHAMINE 30 MG/ML
INJECTION, SOLUTION INTRAMUSCULAR; INTRAVENOUS AS NEEDED
Status: DISCONTINUED | OUTPATIENT
Start: 2024-09-12 | End: 2024-09-12 | Stop reason: SURG

## 2024-09-12 RX ORDER — SENNOSIDES 8.6 MG
17.2 TABLET ORAL NIGHTLY
Status: DISCONTINUED | OUTPATIENT
Start: 2024-09-12 | End: 2024-09-13

## 2024-09-12 RX ORDER — MEPERIDINE HYDROCHLORIDE 25 MG/ML
INJECTION INTRAMUSCULAR; INTRAVENOUS; SUBCUTANEOUS
Status: DISCONTINUED
Start: 2024-09-12 | End: 2024-09-12 | Stop reason: WASHOUT

## 2024-09-12 RX ORDER — MIDAZOLAM HYDROCHLORIDE 1 MG/ML
INJECTION INTRAMUSCULAR; INTRAVENOUS AS NEEDED
Status: DISCONTINUED | OUTPATIENT
Start: 2024-09-12 | End: 2024-09-12 | Stop reason: SURG

## 2024-09-12 RX ORDER — DIPHENHYDRAMINE HYDROCHLORIDE 50 MG/ML
12.5 INJECTION INTRAMUSCULAR; INTRAVENOUS EVERY 4 HOURS PRN
Status: DISCONTINUED | OUTPATIENT
Start: 2024-09-12 | End: 2024-09-13

## 2024-09-12 RX ORDER — ONDANSETRON 2 MG/ML
INJECTION INTRAMUSCULAR; INTRAVENOUS AS NEEDED
Status: DISCONTINUED | OUTPATIENT
Start: 2024-09-12 | End: 2024-09-12 | Stop reason: SURG

## 2024-09-12 RX ORDER — ONDANSETRON 2 MG/ML
4 INJECTION INTRAMUSCULAR; INTRAVENOUS EVERY 6 HOURS PRN
Status: DISCONTINUED | OUTPATIENT
Start: 2024-09-12 | End: 2024-09-13

## 2024-09-12 RX ORDER — NALOXONE HYDROCHLORIDE 0.4 MG/ML
0.08 INJECTION, SOLUTION INTRAMUSCULAR; INTRAVENOUS; SUBCUTANEOUS AS NEEDED
Status: DISCONTINUED | OUTPATIENT
Start: 2024-09-12 | End: 2024-09-12 | Stop reason: HOSPADM

## 2024-09-12 RX ORDER — HYDROMORPHONE HYDROCHLORIDE 1 MG/ML
0.2 INJECTION, SOLUTION INTRAMUSCULAR; INTRAVENOUS; SUBCUTANEOUS EVERY 5 MIN PRN
Status: DISCONTINUED | OUTPATIENT
Start: 2024-09-12 | End: 2024-09-12 | Stop reason: HOSPADM

## 2024-09-12 RX ORDER — TIZANIDINE 2 MG/1
2 TABLET ORAL EVERY 6 HOURS PRN
Status: DISCONTINUED | OUTPATIENT
Start: 2024-09-12 | End: 2024-09-13

## 2024-09-12 RX ORDER — KETOROLAC TROMETHAMINE 30 MG/ML
30 INJECTION, SOLUTION INTRAMUSCULAR; INTRAVENOUS EVERY 6 HOURS
Status: DISCONTINUED | OUTPATIENT
Start: 2024-09-12 | End: 2024-09-13

## 2024-09-12 RX ORDER — HYDROMORPHONE HYDROCHLORIDE 1 MG/ML
0.6 INJECTION, SOLUTION INTRAMUSCULAR; INTRAVENOUS; SUBCUTANEOUS EVERY 5 MIN PRN
Status: DISCONTINUED | OUTPATIENT
Start: 2024-09-12 | End: 2024-09-12 | Stop reason: HOSPADM

## 2024-09-12 RX ORDER — POLYETHYLENE GLYCOL 3350 17 G/17G
17 POWDER, FOR SOLUTION ORAL DAILY PRN
Status: DISCONTINUED | OUTPATIENT
Start: 2024-09-12 | End: 2024-09-13

## 2024-09-12 RX ORDER — LIDOCAINE HYDROCHLORIDE 10 MG/ML
INJECTION, SOLUTION EPIDURAL; INFILTRATION; INTRACAUDAL; PERINEURAL AS NEEDED
Status: DISCONTINUED | OUTPATIENT
Start: 2024-09-12 | End: 2024-09-12 | Stop reason: SURG

## 2024-09-12 RX ORDER — FERROUS SULFATE 325(65) MG
325 TABLET, DELAYED RELEASE (ENTERIC COATED) ORAL
Status: DISCONTINUED | OUTPATIENT
Start: 2024-09-13 | End: 2024-09-13

## 2024-09-12 RX ORDER — ACETAMINOPHEN 500 MG
1000 TABLET ORAL EVERY 8 HOURS
Status: DISCONTINUED | OUTPATIENT
Start: 2024-09-13 | End: 2024-09-13

## 2024-09-12 RX ORDER — DOCUSATE SODIUM 100 MG/1
100 CAPSULE, LIQUID FILLED ORAL 2 TIMES DAILY
Status: DISCONTINUED | OUTPATIENT
Start: 2024-09-12 | End: 2024-09-13

## 2024-09-12 RX ORDER — SODIUM CHLORIDE, SODIUM LACTATE, POTASSIUM CHLORIDE, CALCIUM CHLORIDE 600; 310; 30; 20 MG/100ML; MG/100ML; MG/100ML; MG/100ML
INJECTION, SOLUTION INTRAVENOUS CONTINUOUS
Status: DISCONTINUED | OUTPATIENT
Start: 2024-09-12 | End: 2024-09-12 | Stop reason: HOSPADM

## 2024-09-12 RX ORDER — NAPROXEN 500 MG/1
500 TABLET ORAL 2 TIMES DAILY WITH MEALS
Status: DISCONTINUED | OUTPATIENT
Start: 2024-09-13 | End: 2024-09-13

## 2024-09-12 RX ORDER — ASPIRIN 81 MG/1
81 TABLET ORAL 2 TIMES DAILY
Status: DISCONTINUED | OUTPATIENT
Start: 2024-09-12 | End: 2024-09-13

## 2024-09-12 RX ORDER — HYDROMORPHONE HYDROCHLORIDE 1 MG/ML
0.4 INJECTION, SOLUTION INTRAMUSCULAR; INTRAVENOUS; SUBCUTANEOUS EVERY 5 MIN PRN
Status: DISCONTINUED | OUTPATIENT
Start: 2024-09-12 | End: 2024-09-12 | Stop reason: HOSPADM

## 2024-09-12 RX ORDER — SODIUM PHOSPHATE, DIBASIC AND SODIUM PHOSPHATE, MONOBASIC 7; 19 G/230ML; G/230ML
1 ENEMA RECTAL ONCE AS NEEDED
Status: DISCONTINUED | OUTPATIENT
Start: 2024-09-12 | End: 2024-09-13

## 2024-09-12 RX ADMIN — ONDANSETRON 4 MG: 2 INJECTION INTRAMUSCULAR; INTRAVENOUS at 15:43:00

## 2024-09-12 RX ADMIN — DEXAMETHASONE SODIUM PHOSPHATE 6 MG: 4 MG/ML VIAL (ML) INJECTION at 14:09:00

## 2024-09-12 RX ADMIN — KETAMINE HYDROCHLORIDE 25 MG: 50 INJECTION, SOLUTION INTRAMUSCULAR; INTRAVENOUS at 14:07:00

## 2024-09-12 RX ADMIN — LIDOCAINE HYDROCHLORIDE 2 ML: 10 INJECTION, SOLUTION EPIDURAL; INFILTRATION; INTRACAUDAL; PERINEURAL at 13:54:00

## 2024-09-12 RX ADMIN — SODIUM CHLORIDE, SODIUM LACTATE, POTASSIUM CHLORIDE, CALCIUM CHLORIDE: 600; 310; 30; 20 INJECTION, SOLUTION INTRAVENOUS at 16:09:00

## 2024-09-12 RX ADMIN — SODIUM CHLORIDE, SODIUM LACTATE, POTASSIUM CHLORIDE, CALCIUM CHLORIDE: 600; 310; 30; 20 INJECTION, SOLUTION INTRAVENOUS at 13:45:00

## 2024-09-12 RX ADMIN — KETAMINE HYDROCHLORIDE 25 MG: 50 INJECTION, SOLUTION INTRAMUSCULAR; INTRAVENOUS at 14:35:00

## 2024-09-12 RX ADMIN — KETOROLAC TROMETHAMINE 30 MG: 30 INJECTION, SOLUTION INTRAMUSCULAR; INTRAVENOUS at 15:43:00

## 2024-09-12 RX ADMIN — BUPIVACAINE HYDROCHLORIDE 1.6 ML: 7.5 INJECTION, SOLUTION INTRASPINAL at 13:57:00

## 2024-09-12 RX ADMIN — LIDOCAINE HYDROCHLORIDE 3 ML: 10 INJECTION, SOLUTION EPIDURAL; INFILTRATION; INTRACAUDAL; PERINEURAL at 13:51:00

## 2024-09-12 RX ADMIN — METOCLOPRAMIDE HYDROCHLORIDE 5 MG: 5 INJECTION INTRAMUSCULAR; INTRAVENOUS at 15:43:00

## 2024-09-12 RX ADMIN — TRANEXAMIC ACID 1000 MG: 10 INJECTION, SOLUTION INTRAVENOUS at 14:09:00

## 2024-09-12 RX ADMIN — MIDAZOLAM HYDROCHLORIDE 2 MG: 1 INJECTION INTRAMUSCULAR; INTRAVENOUS at 13:47:00

## 2024-09-12 NOTE — OPERATIVE REPORT
Operative Note    Patient Name/: Fozia Ivan 10/20/1965  Date: 2024  Location: [facility]  Preoperative Diagnosis: Right hip osteoarthritis  Postoperative Diagnosis: Same  Operation: Anterior right total hip arthroplasty  Primary Surgeon: Ruddy Sanchez  Assistant:  Aj TSAI surgical assistant first assist.  POLI Horn also assisted in this case. Please note a skilled surgical assistant was necessary for this case in order to assist with patient positioning, prepping, draping, incision, exposure, implant placement, wound closure.  Indications: 58-year-old female with end-stage hip osteoarthritis failed nonsurgical treatment  Surgical Findings: Severe right hip OA  Operative Report:  After informed consent, the right lower extremity was marked.  Patient was brought to the operating room where spinal anesthesia was induced.  Patient was placed supine on the Osborn table with the operative side arm across the chest.  The right lower extremity was prepped and draped in sterile fashion.  Timeout was performed to verify correct surgical site and procedure.  2 g of Ancef was given within 1 hour of incision.  Additionally, 1 g tranexamic acid was given intravenously.  Next an anterior approach incision was performed, starting 2 fingerbreadths distal and lateral to the ASIS and carried along the trajectory of the tensor muscle, through skin and subcutaneous tissue down to the fascia overlying the tensor.  Superficial bleeders were coagulated.  Incision was performed through the fascia in line with the tensor, and the medial fascia was elevated off the muscle.  An Henri-Derick retractor was placed between the tensor and medial fascia, and the deep fascia overlying the rectus femoris was incised.  The retractor was replaced between rectus and tensor.  Pre-capsular fat was dissected through, with care taken to identify the ascending branch of the lateral femoral circumflex vessels.  These were tied off  and coagulated.  Anterior hip capsule was identified, and retractors were placed above and below the neck, as well as one medial retractor.  Full-thickness capsulotomy was performed and reflected superiorly.  Retractors were replaced within the capsule.  Next the neck cut was performed, the limb was pulled into traction and externally rotated, and the head was freed.  A corkscrew was placed and the head and neck were flexed to allow dissection of posterior capsule off the neck.  The head was subsequently removed.  This measured 49 mm.  The pubo-femoral ligament was then subperiosteally dissected off the femur, externally rotating 90 degrees until the lesser trochanter was identified.  The capsular limb was retracted posteriorly as the reflected head of rectus tendon was partially dissected subperiosteally off the anterior acetabular rim.  Next a deep Charnley retractor was placed, and traction was removed from the limb.  Remaining labrum was removed from around the rim, as well as pulvinar from the base of the acetabulum.  I began reaming under fluoroscopic guidance, starting with a 47 reamer to medialize followed by reaming for position, and reamed by 2 up to a 55 when I had good circumferential fit with bleeding bone anterior, posterior and superior.  A size 56 cup was then placed, using anatomic landmarks and fluoroscopic guidance aiming for 40 degrees abduction and 20 degrees anteversion.  The cup was impacted down to the bone, and was seated with good coverage both anterior and posterior.  2 screws were placed in the posterior superior quadrant.  A 40 mm inner diameter liner was impacted into place.  The cup was just tucked under bone anteriorly.  Next, the elevator arm was attached to the table and placed under the proximal femur.  The femur was externally rotated 90 degrees and retractors were placed.  I performed a full-thickness capsular incision starting at the medial base of the greater trochanter near  our neck cut, and progressing superiorly.  I subperiosteally dissected piriformis and short external rotators off the posterior aspect of the greater trochanter.  This allowed for appropriate external rotation of the limb.  We then dropped the limb to the floor, and externally rotated to gain exposure to the proximal femur.  Soft tissue was removed from the medial aspect of the greater trochanter.  A canal finder was used to initially open the canal as well as determine the trajectory for our stem.  I then began broaching for a echo Bi-Metric Microplasty stem.  I broached up to a size 12 which gave a good fit and fill.  There was a change in pitch with impacting the broach, as well as no further sinking of the broach.  It had good axial control as well.  We trialed with a high offset neck and -6 head.  Following reduction of the hip, fluoroscopy was used to verify stem fit as well as restoration of leg length and offset.  Printed out overlays demonstrated the operative side to be maybe around 5 mm longer with similar offset, which was our preoperative plan given the arthritis in the nonoperative hip and eventual plan for hip replacement.  The hip was externally rotated 90 degrees and anterior dislocation was attempted with a bone hook, and it was stable.  Additionally the hip was stable at 90 degrees external rotation and 20 degrees extension.  This was determined to be the appropriate size, and the hip was subsequently dislocated with traction and external rotation.  The broach was found to be stable, and we therefore opened up the final stem.  This was impacted down in the same rotation as the broach trial.  I trialed head length again and noted that the -6 gave our closest approximation of leg length and offset.  This also provided a stable hip.  The hip was redislocated, head trial was removed, trunnion was washed and dried, and the final head was impacted into place.  The cup was washed and suctioned, and the  hip was reduced.  Betadine lavage was performed, followed by injection of periarticular pain cocktail.  The anterior capsule was laid back across the joint, and closure was performed with running 2-0 Vicryl for fascia, 2-0 Vicryl interrupted for skin, and running Monocryl, followed by Dermabond and Steri-Strips and an Aquasol dressing.  The patient was brought to PACU in good condition.  Anesthesia: Spinal  Complications: None  Specimens: Femoral head  Blood loss: 100 mL  Fluids: See anesthesia report  Implants: Christina [employer] echo Bi-Metric Microplasty size 12 high offset stem, 40 mm -6 ceramic head, 56 mm G7 shell  Drains: None  Condition: Good  Disposition: Floor    -Weightbearing as tolerated, no hip precautions, PT OT  -DVT prophylaxis mechanical plus aspirin twice daily  -Pain control with oral meds  -Perioperative Torief    Ruddy Sanchez MD, FAAOS  [facility]  Phone [phone]  Fax [phone]

## 2024-09-12 NOTE — DISCHARGE INSTRUCTIONS
Sometimes managing your health at home requires assistance.  The [facility] team has recognized your preference to use [facility], formerly [facility].  They can be reached by phone at [phone].  The fax number for your reference is [phone].  A representative from the home health agency will contact you or your family to schedule your first visit.      Joint Replacement Surgery: Patient Discharge Instructions   The best way to contact your surgeon or their team is by phone [phone] (preferred for urgent/acute matters) or through [employer].    Dear Patient,     [facility] cares about your progress with recovery following your joint replacement surgery.     300 days from your scheduled surgery, [facility] will send you a follow-up survey to help us understand how your surgery impacted your mobility, pain, and overall quality of life. Please make every effort to complete this survey. The information collected from this survey will be used by your physician to track your recovery.     Sincerely,     [facility] [place]    What to Expect:  A certain amount of pain, swelling, and bruising is expected for several weeks after surgery.    Pain Medications:   Below is a list of commonly prescribed pain medications. You may have received prescriptions for some, all, or even additional pain medications not listed.  If you are taking the following medications for pain, these are some general guidelines for using and discontinuing them.  You may also want to preemptively take your pain medication before physical therapy (at least 30 minutes prior to the session).  If you are concerned you are suffering side effects from any of these medications please contact your surgeon’s office:    Ultram (Tramadol): Take this as prescribed 3 times per day with meals until your first postoperative appointment. If your pain levels are low, you can stop taking this  on a scheduled basis and start taking it as needed.  Tylenol (Acetaminophen): Take this as prescribed 3 times per day until your first postoperative appointment. Do NOT exceed 4g (4000mg) of acetaminophen daily.  Celebrex (Celecoxib): this medication will treat swelling and pain and should be taken as directed until your first postoperative appointment.  Oxycodone IR (immediate release): This is your “rescue” drug. Take this only as needed for pain that is not controlled (rated more than 4 out of 10) by other medications.  You may wean yourself off prescription pain medication to a non-prescription pain reliever by substituting two 500mg extra-strength Tylenol (Acetaminophen) tablets in place of your prescription pain medications up to four times per day. Do NOT exceed 4g (4000mg) of acetaminophen daily.  *We will discuss pain management and weaning off pain medicine at your 1st postoperative appointment. To avoid delays in getting your narcotic pain medicine refilled, please contact the office prior to running out of medication either by phone [phone] or through [employer]. Please allow 24-72 hours for prescriptions to be filled. Your doctor may need to physically sign a hand-written prescription -- some medicines cannot be re-ordered electronically/or via phone. If you need to  a hand-written prescription, you can do so at the office located at [address] [place] [address], [address].  Pain Medications can be constipating. Below is a guideline for preventing or managing postoperative constipation:  Drink plenty of fluids. Aim to drink at least of 8oz of water 8 times per day (total of 64oz).  Eat a high-fiber diet (whatever helps you stay regular).  Make sure you are taking Senekot S (Docusate Sodium + Sennosides) or Colace (Docusate Sodium), 1-2 tablets twice daily, while on narcotics. You may decrease to once daily if you develop diarrhea. You will be sent home with a prescription.  If you have  not had normal bowel movements the following over the counter medications can be helpful:  Add daily Miralax™ or Metamucil™ as directed on bottle.  If still no results, add a dose of Milk of Magnesia™ as directed on bottle.  If still no results, take one Dulcolax™ (Bisacodyl) suppository as directed on package.  If you still have no results and it has been more than 3 days since you had a bowel movement, be sure to contact your surgeon's office.    Swelling:  You may apply a cloth covered ice pack for up to 20 minutes each hour, or as needed, to your incision to help control pain and swelling. Do not apply directly to your skin.  For the first 7 days after you leave the hospital, it is critical that you elevate your leg above the level of your heart 4 times per day for 1 hour each time. After a week, elevate your leg as needed if swelling is noted.   Call your Surgeons office if you have:  A temperature greater than 100.4 F.  Increasing pain or numbness at the incision or on your operated leg.  Increasing redness, drainage, or odor from the wound.  You WILL be swollen the first week or two after surgery; however, swelling that continues to get worse to your surgical leg or the opposite leg and is accompanied by discomfort or redness should be reported.  **Go to the Emergency Room if you have chest pain or shortness of breath**  Activities:  Your activity order is:   Weight bearing as tolerated  If you have home health care, a physical therapist (PT) will come to your home 2-3 times per week. The number of PT visits will depend on your needs and your insurance coverage.   At your first postoperative appointment with your surgeon, you will be given a prescription for outpatient physical therapy if you have not already started outpatient therapy.  Rules of the road: No driving until it is approved by your care team, and you are no longer taking narcotic pain medicine.    Wound Care/ Bathing:   Aquacel (waterproof brown  rubberized dressing) should remain in place for 7 days and may then be removed. While this waterproof dressing is in place, you may shower and let water run over the dressing (ensure first that the dressing seal is intact and none of the edges have rolled up exposing the wound - if the dressing has become open to the environment, do not allow water to enter into this area)  Once the original postoperative dressing is removed, you do not need to keep your incision covered. If you would like to keep it covered for comfort you may - use a clean new dressing each day, or more frequently if needed (if the dressing is soiled or it is not staying fixed to your skin). Use the dressings suggested by the nurse at discharge.  After the original postoperative dressing is removed, you should continue to keep the incision covered when showering to prevent it from getting wet prior to your first postoperative appointment. Before showering, be sure to cover the incision with saran wrap or a plastic bag to keep dry. After showering, pat the incision with a clean towel to ensure it is dry. Do this until you are cleared to get the incision wet (usually after the first postoperative appointment).  Once cleared to get the incision wet, you may gently allow soap and water to rinse over the area. Be sure to rinse the area well and gently pat dry.  Do not apply soap, lotion, cream, ointments, or powders to your incision. Keep the incision clean and dry.  Do not soak your incision in water. No tub baths, pools or hot tubs for at least 6 weeks after surgery and not until the wound is completely healed.  If you have stitches or staples, they will be removed at your first postoperative appointment or the week after. Do not remove steri-strips, if you have any. These will come off on their own and do not need to be replaced.  If you have a home care nurse they should:  Examine the incision during visits and call your surgeon if there are any  signs of infection or other cause for concern.  Change your dressing and may remove staples (when indicated).  Take your vitals and draw your labs.    Remember, good hand washing with soap at all times helps prevent infections. Wash your hands with soap and water prior to performing any dressing changes or wound evaluation.    Medications/Special Instructions:  Do not restart your blood pressure medication until cleared by your physician, or until your systolic blood pressure (top number) is above 130 on 2 consecutive checks and then continue your medication as prescribed.    Blood Thinners (you will be on blood thinners for a total of six weeks):  Aspirin: some patients will be prescribed Aspirin to prevent blood clots after surgery. If you are prescribed aspirin, take one 81mg tablet by mouth twice per day for six weeks.  DO NOT restart okxim9s/fish oils, glucosamine, nonsteroidal antiinflammatories [NSAIDs, such as such as Ibuprofen (Advil, Motrin), Naproxen (Naprosyn, Aleve), Diclofenac (Voltaren), Meloxicam (Mobic)], tumeric, cinnamon, progesterone, estrogen, or Aspirin (unless told differently) until you have completed your blood thinner. These will increase your risk of bleeding.   Protonix (Omeprazole/Nexium, etc.) may be ordered if Aspirin is to be continued while on your blood thinner and should be taken daily during that time. This will help to protect your stomach.    X-Ray  X-rays needed: none    If you do not have a follow-up appointment with your surgeon, or you need to change your follow-up appointment date/time, please call today to schedule or change this appointment: [phone]. Our phones are open to schedule appointments from 8:30am to 4:30pm, Monday through Friday.  If you have any questions or concerns during the postoperative period (for example: wound issues, pain, swelling, redness or drainage) call our office FIRST at [phone] so that we may appropriately direct you or set up a  clinic appointment.  Spanda- hip replacement(single layer compression sleeve):     All patients should wear the compression sleeve until first follow up visit to surgeon’s office (about 2-3 weeks) and then check with surgeon if need to continue use.  Take off to shower. Best to keep on as much as possible, even at night, except when washing out.  Wash with mild soap and water; DRIP dry overnight- put back on before getting up for the day.  Use one long tube on the calf.   It should end up just below the back of the knee and the other end on the ball of the foot to expose the toes.   Makes sure it is NOT bunched up anywhere.  IF the Spanda- feels TOO tight, then REMOVE it and call your surgeon to let them know.

## 2024-09-12 NOTE — ANESTHESIA POSTPROCEDURE EVALUATION
[facility]    Fozia Ivan Patient Status:  Outpatient in a Bed   Age/Gender 58 year old female MRN [id number]   Location [facility] POST ANESTHESIA CARE UNIT Attending Ruddy Sanchez MD   Hosp Day # 0 PCP Marta Kidd MD       Anesthesia Post-op Note    Right anterior total hip arthroplasty    Procedure Summary       Date: 09/12/24 Room / Location:  MAIN OR  /  MAIN OR    Anesthesia Start: 1342 Anesthesia Stop: 1618    Procedure: Right anterior total hip arthroplasty (Right) Diagnosis:       Primary osteoarthritis of right hip      (Primary osteoarthritis of right hip [M16.11])    Surgeons: Ruddy Sanchez MD Anesthesiologist: Mp Paul MD    Anesthesia Type: spinal ASA Status: 2            Anesthesia Type: No value filed.    Vitals Value Taken Time   /103 09/12/24 1618   Temp 99.9 °F (37.7 °C) 09/12/24 1618   Pulse 80 09/12/24 1618   Resp 19 09/12/24 1618   SpO2 100 % 09/12/24 1618   Vitals shown include unfiled device data.    Patient Location: PACU    Anesthesia Type: general    Airway Patency: patent    Postop Pain Control: adequate    Mental Status: preanesthetic baseline    Nausea/Vomiting: none    Cardiopulmonary/Hydration status: stable euvolemic    Complications: Other: (Failed spinal. Neuraxial had good CSF return, aspiration before and end of injection. However patient had reaction to incision and moving legs necessitating GA)    Postop vital signs: stable    Dental Exam: Unchanged from Preop    Patient to be discharged from PACU when criteria met.

## 2024-09-12 NOTE — ANESTHESIA PROCEDURE NOTES
Spinal Block    Date/Time: 9/12/2024 1:51 PM    Performed by: Mp Paul MD  Authorized by: Mp Paul MD      General Information and Staff    Start Time:  9/12/2024 1:51 PM  End Time:  9/12/2024 1:58 PM  Anesthesiologist:  Mp Paul MD  Performed by:  Anesthesiologist  Site identification: surface landmarks    Reason for Block: surgical anesthesia    Preanesthetic Checklist: patient identified, IV checked, risks and benefits discussed, monitors and equipment checked, pre-op evaluation, timeout performed, anesthesia consent and sterile technique used      Procedure Details    Patient Position:  Sitting  Prep: ChloraPrep    Monitoring:  Cardiac monitor, heart rate and continuous pulse ox  Approach:  Midline  Location:  L3-4  Injection Technique:  Single-shot    Needle    Needle Type:  Sprotte  Needle Gauge:  24 G  Needle Length:  3.5 in    Assessment    Sensory Level:   Events: clear CSF, CSF aspirated, well tolerated and blood negative      Additional Comments

## 2024-09-12 NOTE — INTERVAL H&P NOTE
Pre-op Diagnosis: Primary osteoarthritis of right hip [M16.11]    The above referenced H&P was reviewed by Ruddy Sanchez MD on 9/12/2024, the patient was examined and no significant changes have occurred in the patient's condition since the H&P was performed.  I discussed with the patient and/or legal representative the potential benefits, risks and side effects of this procedure; the likelihood of the patient achieving goals; and potential problems that might occur during recuperation.  I discussed reasonable alternatives to the procedure, including risks, benefits and side effects related to the alternatives and risks related to not receiving this procedure.  We will proceed with procedure as planned.

## 2024-09-12 NOTE — ANESTHESIA PREPROCEDURE EVALUATION
PRE-OP EVALUATION    Patient Name: Fozia Ivan    Admit Diagnosis: Primary osteoarthritis of right hip [M16.11]    Pre-op Diagnosis: Primary osteoarthritis of right hip [M16.11]    Right anterior total hip arthroplasty    Anesthesia Procedure: Right anterior total hip arthroplasty (Right)    Surgeons and Role:     * Ruddy Sanchez MD - Primary    Pre-op vitals reviewed.        Body mass index is 23.32 kg/m².    Current medications reviewed.  Hospital Medications:   povidone-iodine (Betadine) 10 % 17.5 mL in sodium chloride 0.9 % 500 mL irrigation   Irrigation Once (Intra-Op)    clonidine/epinephrine/ropivacaine/ketorolac in 0.9% NaCl 60 mL pain cocktail syringe for knee arthroplasty   Infiltration Once (Intra-Op)       Outpatient Medications:     Facility-Administered Medications Prior to Admission   Medication Dose Route Frequency Provider Last Rate Last Admin    [COMPLETED] triamcinolone acetonide (Kenalog-40) 40 MG/ML injection 40 mg  40 mg Intra-articular Once Ruddy Sanchez MD   40 mg at 09/06/24 0914     Medications Prior to Admission   Medication Sig Dispense Refill Last Dose    celecoxib 200 MG Oral Cap Take 1 capsule (200 mg total) by mouth 2 (two) times daily. (Patient not taking: Reported on 9/6/2024) 180 capsule 3     atorvastatin 10 MG Oral Tab Take 1 tablet (10 mg total) by mouth daily. 90 tablet 3     traMADol 50 MG Oral Tab Take 1 tablet (50 mg total) by mouth every 6 (six) hours as needed for Pain. 30 tablet 0     Multiple Vitamins Oral Tab Take 1 tablet by mouth daily. Vitamin D. Vitamin C.       acetaminophen 500 MG Oral Tab Take 1 tablet (500 mg total) by mouth every 6 (six) hours as needed for Pain.          Allergies: Cephalexin and Penicillins      Anesthesia Evaluation        Anesthetic Complications  (-) history of anesthetic complications         GI/Hepatic/Renal    Negative GI/hepatic/renal ROS.                             Cardiovascular    Negative cardiovascular ROS.    Exercise  tolerance: good     MET: >4                                           Endo/Other    Negative endo/other ROS.                              Pulmonary    Negative pulmonary ROS.                       Neuro/Psych                 (+) neuromuscular disease                     Past Surgical History:   Procedure Laterality Date    Laser surgery of cervix      genital warts removal    Thompson localization wire 1 site right (cpt=19281)      CYST     Other surgical history  age 7    urethra surgery    Tubal ligation  2005     Social History     Socioeconomic History    Marital status:    Tobacco Use    Smoking status: Former     Current packs/day: 1.00     Types: Cigarettes    Smokeless tobacco: Never    Tobacco comments:     Updated 5/3/24   Vaping Use    Vaping status: Never Used   Substance and Sexual Activity    Alcohol use: Yes     Alcohol/week: 1.0 standard drink of alcohol     Types: 1 Glasses of wine per week     Comment: occasionally    Drug use: No    Sexual activity: Yes     Partners: Male     Birth control/protection: Tubal Ligation     History   Drug Use No     Available pre-op labs reviewed.  Lab Results   Component Value Date    WBC 6.4 08/21/2024    RBC 4.92 08/21/2024    HGB 14.3 08/21/2024    HCT 43.7 08/21/2024    MCV 88.8 08/21/2024    MCH 29.1 08/21/2024    MCHC 32.7 08/21/2024    RDW 13.8 08/21/2024    .0 08/21/2024     Lab Results   Component Value Date     08/21/2024    K 4.0 08/21/2024     08/21/2024    CO2 26.0 08/21/2024    BUN 15 08/21/2024    CREATSERUM 0.74 08/21/2024    GLU 97 08/21/2024    CA 9.8 08/21/2024            Airway      Mallampati: II  Mouth opening: 3 FB  TM distance: 4 - 6 cm  Neck ROM: full Cardiovascular      Rhythm: regular  Rate: normal     Dental             Pulmonary      Breath sounds clear to auscultation bilaterally.               Other findings              ASA: 2   Plan: spinal  NPO status verified and patient meets guidelines.  Patient has not taken  beta blockers in last 24 hours.  Post-procedure pain management plan discussed with surgeon and patient.      Plan/risks discussed with: patient  Use of blood product(s) discussed with: patient              Present on Admission:  **None**

## 2024-09-13 VITALS
DIASTOLIC BLOOD PRESSURE: 70 MMHG | HEART RATE: 61 BPM | WEIGHT: 139.56 LBS | BODY MASS INDEX: 23.82 KG/M2 | OXYGEN SATURATION: 100 % | RESPIRATION RATE: 16 BRPM | SYSTOLIC BLOOD PRESSURE: 127 MMHG | TEMPERATURE: 99 F | HEIGHT: 64 IN

## 2024-09-13 PROCEDURE — 99024 POSTOP FOLLOW-UP VISIT: CPT | Performed by: ORTHOPAEDIC SURGERY

## 2024-09-13 RX ORDER — TRAMADOL HYDROCHLORIDE 50 MG/1
50 TABLET ORAL EVERY 8 HOURS
Qty: 45 TABLET | Refills: 0 | Status: SHIPPED | OUTPATIENT
Start: 2024-09-13 | End: 2024-09-13

## 2024-09-13 RX ORDER — ASPIRIN 81 MG/1
81 TABLET ORAL 2 TIMES DAILY
Qty: 80 TABLET | Refills: 0 | Status: SHIPPED | OUTPATIENT
Start: 2024-09-13 | End: 2024-10-23

## 2024-09-13 RX ORDER — ASPIRIN 81 MG/1
81 TABLET ORAL 2 TIMES DAILY
Qty: 80 TABLET | Refills: 0 | Status: SHIPPED | OUTPATIENT
Start: 2024-09-13 | End: 2024-09-13

## 2024-09-13 RX ORDER — ACETAMINOPHEN 500 MG
1000 TABLET ORAL EVERY 8 HOURS
Qty: 90 TABLET | Refills: 0 | Status: SHIPPED | OUTPATIENT
Start: 2024-09-13 | End: 2024-09-13

## 2024-09-13 RX ORDER — OXYCODONE HYDROCHLORIDE 5 MG/1
5 TABLET ORAL EVERY 4 HOURS PRN
Qty: 30 TABLET | Refills: 0 | Status: SHIPPED | OUTPATIENT
Start: 2024-09-13 | End: 2024-09-13

## 2024-09-13 RX ORDER — SENNA AND DOCUSATE SODIUM 50; 8.6 MG/1; MG/1
1 TABLET, FILM COATED ORAL 2 TIMES DAILY PRN
Qty: 30 TABLET | Refills: 0 | Status: SHIPPED | OUTPATIENT
Start: 2024-09-13 | End: 2024-09-13

## 2024-09-13 RX ORDER — SENNA AND DOCUSATE SODIUM 50; 8.6 MG/1; MG/1
1 TABLET, FILM COATED ORAL 2 TIMES DAILY PRN
Qty: 30 TABLET | Refills: 0 | Status: SHIPPED | OUTPATIENT
Start: 2024-09-13

## 2024-09-13 RX ORDER — TRAMADOL HYDROCHLORIDE 50 MG/1
50 TABLET ORAL EVERY 8 HOURS
Qty: 45 TABLET | Refills: 0 | Status: SHIPPED | OUTPATIENT
Start: 2024-09-13 | End: 2024-09-28

## 2024-09-13 RX ORDER — ACETAMINOPHEN 500 MG
1000 TABLET ORAL EVERY 8 HOURS
Qty: 90 TABLET | Refills: 0 | Status: SHIPPED | OUTPATIENT
Start: 2024-09-13 | End: 2024-09-28

## 2024-09-13 RX ORDER — OXYCODONE HYDROCHLORIDE 5 MG/1
5 TABLET ORAL EVERY 4 HOURS PRN
Qty: 30 TABLET | Refills: 0 | Status: SHIPPED | OUTPATIENT
Start: 2024-09-13

## 2024-09-13 NOTE — CM/SW NOTE
09/13/24 0800   CM/SW Referral Data   Referral Source Social Work (self-referral)   Reason for Referral Discharge planning   Informant EMR;Clinical Staff Member   Discharge Needs   Anticipated D/C needs Home health care       Patient is a 59 y/o woman admitted s/p THR.  Pt with pre-operative plan for [facility] ([facility]  PT eval pending.  Referral sent to [facility] ([facility]) via [facility].  Await confirmation of acceptance and therapy evals for further DC planning.  / to remain available for support and/or discharge planning.     Christine Wheeler, [facility]  Discharge Planner  [phone]

## 2024-09-13 NOTE — PHYSICAL THERAPY NOTE
PHYSICAL THERAPY EVALUATION - INPATIENT     Room Number: 367/367-A  Evaluation Date: 9/13/2024  Type of Evaluation: Initial  Physician Order: PT Eval and Treat  Presenting Problem: s/p anterior R LILLIAM  Co-Morbidities : Dyslipidemia, diverticulosis, DDD,  Reason for Therapy: Mobility Dysfunction and Discharge Planning    Operation 9/12/24 Dr Sanchez: Anterior right total hip arthroplasty  PHYSICAL THERAPY ASSESSMENT   Patient is a 58 year old female admitted 9/12/2024 for R LILLIAM.   Patient is currently functioning near baseline with bed mobility, transfers, gait, stair negotiation, maintaining seated position, standing prolonged periods, and performing household tasks. Prior to admission, patient's baseline is indep.     Patient will benefit from continued skilled PT Services at discharge to promote prior level of function and safety with additional support and return home with home health PT.    PLAN  Patient has been evaluated and presents with no skilled Physical Therapy needs at this time.  Patient discharged from Physical Therapy services.  Please re-order if a new functional limitation presents during this admission.    GOALS  Patient was able to achieve the following goals ...  Patient was able to transfer At previous, functional level  Safely and independently   Patient able to ambulate on level surfaces At previous, functional level  Safely with RW     HOME SITUATION  Type of Home: [facility]   Home Layout: Two level  Stairs to Enter : 2  Railing: No  Stairs to Bedroom: 14  Railing: Yes       Drives: Yes  Patient Owned Equipment: Rolling walker;Cane  Patient Regularly Uses: None    Prior Level of [place]:   Per pt lives in two story [facility] with daughter; bed/bath upstairs. Ambulates without device, but does own RW and cane. Works in [employer] at [employer].     SUBJECTIVE  \"I could barely walk before the surgery.\"    OBJECTIVE  Precautions: Bed/chair alarm  Fall Risk: Standard fall risk    WEIGHT BEARING  RESTRICTION  Weight Bearing Restriction: R lower extremity        R Lower Extremity: Weight Bearing as Tolerated       PAIN ASSESSMENT  Rating: Unable to rate  Location: right hip  Management Techniques: Activity promotion;Body mechanics;Repositioning    COGNITION  Overall Cognitive Status:  WFL - within functional limits    RANGE OF MOTION AND STRENGTH ASSESSMENT  Upper extremity ROM and strength are within functional limits   Lower extremity ROM is within functional limits   Lower extremity strength is within functional limits     BALANCE  Static Sitting: Normal  Dynamic Sitting: Good  Static Standing: Fair +  Dynamic Standing: Fair -    ADDITIONAL TESTS                                    ACTIVITY TOLERANCE                         O2 WALK       NEUROLOGICAL FINDINGS                        AM-PAC '6-Clicks' INPATIENT SHORT FORM - BASIC MOBILITY  How much difficulty does the patient currently have...  Patient Difficulty: Turning over in bed (including adjusting bedclothes, sheets and blankets)?: A Little   Patient Difficulty: Sitting down on and standing up from a chair with arms (e.g., wheelchair, bedside commode, etc.): A Little   Patient Difficulty: Moving from lying on back to sitting on the side of the bed?: A Little   How much help from another person does the patient currently need...   Help from Another: Moving to and from a bed to a chair (including a wheelchair)?: A Little   Help from Another: Need to walk in hospital room?: A Little   Help from Another: Climbing 3-5 steps with a railing?: A Little       AM-PAC Score:  Raw Score: 18   Approx Degree of Impairment: 46.58%   Standardized Score (AM-PAC Scale): 43.63   CMS Modifier (G-Code): CK    FUNCTIONAL ABILITY STATUS  Gait Assessment   Functional Mobility/Gait Assessment  Gait Assistance: Supervision;Contact guard assist  Distance (ft): 75, 150  Assistive Device: Rolling walker  Pattern: R Decreased stance time  Stairs: Stairs  How Many Stairs: 4  Device:  2 Rails  Assist: Supervision  Pattern: Ascend and Descend  Ascend and Descend : Step to    Skilled Therapy Provided     Bed Mobility:  Rolling: NT  Supine to sit: NT   Sit to supine: NT     Transfer Mobility:  Sit to stand: supervision to RW- v/c for hand placement   Stand to sit: supervision  Gait = initial CGA progressed to supervision w/ RW x 75 feet, 150 feet- decr RLE stance time, step to pattern    Therapist's comments:  Patient presents sitting up in bedside chair. Daughter present in room. Discussed role and goal of physical therapy POD1 LILLIAM. Pt in agreement to session. Reports minor pain at this time. Educated on WBAT to RLE - pt verbalizes understanding.  Sit to stand to RW at supervision. Ambulated 75 feet with RW at initial CGA progressed to supervision. Writer educated on stair navigation- up with good, down with bad. Pt ascended/descended 4 steps with bilateral rails at supervision. Pt deferred car transfer as she was able to verbalize correct steps to complete. Ambulated 150 feet with RW at supervision. Upright in chair at end of session. Discussed importance of continued out of bed mobility. Encouraged continued ambulation with nursing staff 3-4 times daily. At time of dc, recommend hourly change in positions/ambulation. Encourage use of ice for pain and swelling management. LILLIAM exercise sheet provided to patient.     Exercise/Education Provided:  Body mechanics  Energy conservation  Functional activity tolerated  Gait training  Posture  Transfer training    Patient End of Session: Up in chair;Needs met;Call light within reach;RN aware of session/findings;All patient questions and concerns addressed;SCDs in place;Ice applied;Family present;Discussed recommendations with /    Patient Evaluation Complexity Level:  History Moderate - 1 or 2 personal factors and/or co-morbidities   Examination of body systems Low -  addressing 1-2 elements   Clinical Presentation Low- Stable    Clinical Decision Making Low Complexity     PT Session Time: 30 minutes  Gait Training: 15 minutes

## 2024-09-13 NOTE — PLAN OF CARE
Alert and Oriented x4. On RA. VSS. Dressing in place to Rt Hip, C/D/I; Mild Pain controlled by scheduled medications, see MAR for actions. Denies N/T. Voiding freely. Tolerating diet. Denies N/V. Call light within reach at this time.    Plan: PT/OT to see

## 2024-09-13 NOTE — CM/SW NOTE
Met with patient/family and provided [facility] information for [facility] (aka [facility]).  Pt agreeable with DC plan.  No further DC needs/concerns identified at this time.  SW available should additional discharge needs arise.    Christine Wheeler, [facility]  Discharge Planner  [phone]

## 2024-09-13 NOTE — PROGRESS NOTES
AVS reviewed, IV dc;d, will dc home w/ [facility] once meds delivered, Tramadol given prior to discharge, daughter in room - both verbalized understanding of dc instructions.    1212: Walker given prior to dc.

## 2024-09-13 NOTE — PROGRESS NOTES
[facility] Ortho Progress Note    Subjective: No acute events.  Pain well-controlled.  Tolerating diet.    Objective: Sitting up in bedside chair dressed ago.  Right lower extremity dressing clean dry and intact.  Neurovascular intact right lower extremity.      Assessment/Plan: 58 year old female postop day # 1 status post anterior right total hip arthroplasty.  -Weightbearing as tolerated, no hip precautions, PT OT  -DVT prophylaxis mechanical plus aspirin twice daily  -Pain control with oral meds  -Perioperative Ancef  Disposition: Discharge home after clearing therapy    Ruddy Sanchez MD, FAAOS  [facility] Orthopaedic Surgery  Phone [phone]  Fax [phone]

## 2024-09-16 ENCOUNTER — TELEPHONE (OUTPATIENT)
Dept: ORTHOPEDICS CLINIC | Facility: CLINIC | Age: 59
End: 2024-09-16

## 2024-09-16 ENCOUNTER — TELEPHONE (OUTPATIENT)
Dept: INTERNAL MEDICINE CLINIC | Facility: CLINIC | Age: 59
End: 2024-09-16

## 2024-09-16 ENCOUNTER — DOCUMENTATION ONLY (OUTPATIENT)
Dept: ORTHOPEDICS CLINIC | Facility: CLINIC | Age: 59
End: 2024-09-16

## 2024-09-16 DIAGNOSIS — M16.12 PRIMARY OSTEOARTHRITIS OF LEFT HIP: Primary | ICD-10-CM

## 2024-09-16 NOTE — PROGRESS NOTES
SURGERY SCHEDULING SHEET     Fozia Ivan  10/20/1965  [id number]     Procedure: Left anterior total hip arthroplasty     CPT: 53713     Diagnosis: Left hip arthritis     Anesthesia: Choice     Length of Surgery: 2 hours     Disposition: Inpatient     Instruments: Christina LILLIAM     Assist: If case scheduled on Thurs/Fri, then Lion Huerta first assist. If case scheduled on Tues, then Aj Zulema ([phone]) first assist. If Aj unavailable, then please communicate to Lion Huerta/Tobin/Judd to cancel Lion's clinic for that day and have Lion first assist. If Lion unavailable, contact Deonte Joya for first assist. If Aj and Deonte unavailable, then contact [facility] for first assist.     Pre-op Testing: CBC, CMP, PT/INR, PTT, TYPE AND SCREEN, and MRSA/MSSA THROUGH EDW PAT     Clearance: MEDICAL/PCP     Post op: 2 weeks postop appointment with Lion Huerta     Surgery date specifics: Please look to schedule for newly opened slot on Tuesday, December 10 if patient is okay with that date.  Thank you     Ruddy Sanchez MD, FAAOS  [facility] Orthopedic Surgery  Phone: [phone]  Fax: [phone]

## 2024-09-16 NOTE — TELEPHONE ENCOUNTER
Date of Surgery: 12/10/2024    Post Op Appt: 12/24/2024 840AM    Case ID: [id number]    Notes:     SURGERY SCHEDULING SHEET     Fozia Ivan  10/20/1965  [id number]     Procedure: Left anterior total hip arthroplasty     CPT: 61458     Diagnosis: Left hip arthritis     Anesthesia: Choice     Length of Surgery: 2 hours     Disposition: Inpatient     Instruments: Christina LILLIAM     Assist: If case scheduled on Thurs/Fri, then Lion Huerta first assist. If case scheduled on Tues, then Aj Poole ([phone]) first assist. If Aj unavailable, then please communicate to Lion Huerta/Tobin/Judd to cancel Lion's clinic for that day and have Lion first assist. If Lion unavailable, contact Deonte Joya for first assist. If Aj and Deonte unavailable, then contact [facility] for first assist.     Pre-op Testing: CBC, CMP, PT/INR, PTT, TYPE AND SCREEN, and MRSA/MSSA THROUGH EDW PAT     Clearance: MEDICAL/PCP     Post op: 2 weeks postop appointment with Lion Huerta     Surgery date specifics: Please look to schedule for newly opened slot on Tuesday, December 10 if patient is okay with that date.  Thank you     Ruddy Sanchez MD, [facility]  [facility] Orthopedic Surgery  Phone: [phone]  Fax: [phone]

## 2024-09-16 NOTE — TELEPHONE ENCOUNTER
S/P went over surgical protocol and scheduled Post Op Appointment. Patient had no other questions or concerns. I did advise the Patient if they had any additional questions to give us a call back for further assistance. Medical clearance request sent

## 2024-09-16 NOTE — TELEPHONE ENCOUNTER
Received a fax today from:    [facility]  Dr. Sanchez  Orthopedic Surgery    PH. [phone]  FAX. [phone] and [phone]    Surgery Date:  12/10/2024    Left Anterior Total Hip Arthroplasty    Appointment with Carley CLEMENT-DOUG    Future Appointments   Date Time Provider Department Center   11/26/2024  7:00 AM Carley Resendiz APRN [facility] [address] [facility]     Forms in red folder

## 2024-09-25 ENCOUNTER — OFFICE VISIT (OUTPATIENT)
Dept: ORTHOPEDICS CLINIC | Facility: CLINIC | Age: 59
End: 2024-09-25
Payer: COMMERCIAL

## 2024-09-25 VITALS — BODY MASS INDEX: 23.22 KG/M2 | WEIGHT: 136 LBS | HEIGHT: 64 IN

## 2024-09-25 DIAGNOSIS — M16.0 PRIMARY OSTEOARTHRITIS OF BOTH HIPS: ICD-10-CM

## 2024-09-25 DIAGNOSIS — Z96.641 STATUS POST RIGHT HIP REPLACEMENT: Primary | ICD-10-CM

## 2024-09-25 PROCEDURE — 99024 POSTOP FOLLOW-UP VISIT: CPT | Performed by: PHYSICIAN ASSISTANT

## 2024-09-25 RX ORDER — TRAMADOL HYDROCHLORIDE 50 MG/1
50 TABLET ORAL EVERY 8 HOURS PRN
Qty: 45 TABLET | Refills: 0 | Status: SHIPPED | OUTPATIENT
Start: 2024-09-25

## 2024-09-25 NOTE — PROGRESS NOTES
[facility] Progress Note    Subjective: Patient returns to clinic 2 weeks status post right anterior total hip arthroplasty performed on 9/12/24.  They have been taking Tylenol, Tramadol for pain control.  They continue to take aspirin for DVT prophylaxis. They are overall satisfied with their progress.  Denies any fevers, chills, night sweats.  No redness or drainage from the incision concerning for infection.    Objective: Patient is seated comfortably in the exam chair. Alert and oriented. Nonlabored breathing. Grossly neurologically intact. Incision is clean, dry, and intact with steri-strips in place without any redness or drainage concerning for infection. Calves are soft and nontender.    Assessment/Plan:  Patient is doing well now 2 weeks s/p right anterior total hip arthoplasty. They may get their incision wet in the shower at this point in time, avoiding soaking or submerging the incision in a pool or tub until 6 weeks postop. Continue with aspirin for DVT prophylaxis. Outpatient physical therapy referral written.  Follow-up in 4 weeks with Dr. Sanchez for routine 6-week postoperative visit or sooner if any concerns.  The patient's questions were sought and answered satisfactorily.  They are happy with the plan and will follow as advised.    X-rays needed at next visit: Postoperative radiographs right hip        Lion Huerta PA-C  [facility] Orthopedic Surgery    This note was dictated using Dragon software.  While it was briefly proofread prior to completion, some grammatical, spelling, and word choice errors due to dictation may still occur.

## 2024-09-26 ENCOUNTER — TELEPHONE (OUTPATIENT)
Dept: PHYSICAL THERAPY | Facility: HOSPITAL | Age: 59
End: 2024-09-26

## 2024-09-27 ENCOUNTER — OFFICE VISIT (OUTPATIENT)
Dept: PHYSICAL THERAPY | Age: 59
End: 2024-09-27
Attending: ORTHOPAEDIC SURGERY
Payer: COMMERCIAL

## 2024-09-27 DIAGNOSIS — M16.11 PRIMARY OSTEOARTHRITIS OF RIGHT HIP: Primary | ICD-10-CM

## 2024-09-27 PROCEDURE — 97110 THERAPEUTIC EXERCISES: CPT

## 2024-09-27 PROCEDURE — 97161 PT EVAL LOW COMPLEX 20 MIN: CPT

## 2024-09-27 NOTE — PROGRESS NOTES
POST-OP HIP EVALUATION:     Diagnosis:   Status post right hip replacement (Z96.641)       Referring Provider: Ruddy Sanchez  Date of Evaluation:    9/27/2024    Precautions:   Hip precautions.  Next MD visit:   10/23/2024  Date of Surgery: 9/12/2024     PATIENT SUMMARY   Fozia Iavn is a 58 year old female who presents to therapy today s/p R LILLIAM with anterior approach on 9/12/2024. Current complaints include mostly low back pain and R sided anterior shin pain, she is also wanting to get back to exercise/gym routines.  Hip/WB Precautions: Anterior hip precautions, but per patient released to activities as tolerated.   Pt describes pain level current 3/10, at best 2/10, at worst 6/10.   Current functional limitations include limited ability to ambulate for over 15 minutes due to fatigue, ascending stairs requires step-to-gait pattern, difficulty with transitional movements.     Fozia describes prior level of function independent with increased time required due to severe R sided hip pain.Prior to surgery was using a cane, with reports of limping. She was limited in her ability to walk prior to surgery. Pt goals include getting back to her gym routine, and improving overall functional mobility w/o onset of hip pain.  Past medical history was reviewed with Fozia. Significant findings include   Past Medical History:   Diagnosis Date    ADHD (attention deficit hyperactivity disorder)     Arthritis     Back problem     Genital warts     Hearing loss     High cholesterol     Visual impairment     contact lenses        ASSESSMENT  Fozia presents to physical therapy evaluation with primary c/o S/p R LILLIAM anterior approach. The results of the objective tests and measures show slight limitations in hip PROM in all directions, decreased functional hip strength on R, and difficulty with single leg stance on the R.  Functional deficits include but are not limited to inability to drive, difficulty with stairs, pain with  transitional movements, and limiting gym activities. Signs and symptoms are consistent with diagnosis of S/p R LILLIAM anterior approach. Pt and PT discussed evaluation findings, pathology, POC and HEP.  Pt voiced understanding and performs HEP correctly without reported pain. Skilled Physical Therapy is medically necessary to address the above impairments and reach functional goals.     OBJECTIVE:   Observation/Skin: Incision site intact   Palpation: Tenderness around glute, piriformis and ITB on the R.   Sensation: intact and equal bilaterally with slight sensation deficit in area of glute medius on R.    PROM: (* denotes performed with pain)  Hip   Flexion: R 110; L 130  Extension: R 0 deg, not formally assessed due to precautions   Abduction: R 35; L 45  ER: R 40; L 45  IR: R *25; L *20       Accessory motion: limited lumbar flexion and extension ROM    Flexibility:   Hamstrings: R Mild; L Mild  Piriformis: R Mod; L Mod  Quads: R Mod; L Mod  Hip Flexor: R Mod, L Mod    Strength/MMT: (* denotes performed with pain)  Hip Knee   Flexion: R not tested due to surgical precaution /5; L 5/5  Extension: R not tested due to surgical precaution /5; L 4/5  Abduction: R not tested due to surgical precaution/5; L 4/5  ER: R not tested due to surgical precaution/5; L 4-/5  IR: R not tested due to surgical precaution /5; L 4-/5 Flexion: R 4/5; L 5/5  Extension: R 4-/5; L 5/5        Balance: SLS: R 8 sec, L 30 sec    Functional Mobility:  5x sit<>stand: 12  TUG (AD, time): 10 sec    Gait: pt ambulates on level ground with decreased ne, antalgia, decreased step length R, decreased stance phase R, and decreased arm swing  Today’s Treatment and Response:   Pt education was provided on exam findings, treatment diagnosis, treatment plan, expectations, and prognosis. Pt was also provided recommendations for activity modifications, possible soreness after evaluation, modalities as needed [ice/heat], ergonomics, pain science education  , importance of remaining active, and strategies to reduce fall risk at home.  Patient was instructed in and issued a HEP for: Access Code: [email or web address]  URL: [email or web address]  Date: 09/27/2024  Prepared by: James Ashraf    Exercises  - Prone Quadriceps Stretch with Strap  - 1 x daily - 5-7 x weekly - 3 sets - 30-45 hold  - Standing Gastroc Stretch  - 1 x daily - 5-7 x weekly - 3 sets - 30-45 hold  - Mini Squat with Counter Support  - 1 x daily - 3-4 x weekly - 3 sets - 10 reps  - Standing March with Counter Support  - 1 x daily - 3-4 x weekly - 3 sets - 10-12 reps  - Sidelying Hip Abduction  - 1 x daily - 3-4 x weekly - 3 sets - 10-12 reps    Charges: PT Omayraal Low Complexity, 22      Total Timed Treatment: 23 min     Total Treatment Time: 45 min       PLAN OF CARE:    Goals: (To be met in 20 visits)   Pt will have improved hip AROM Flex to 130 deg and ABD to 45 deg to be able to don/doff shoes and perform car transfers without difficulty  Pt will improve hip ABD and ER strength to 5/5 to increase ease with standing and walking   Pt will demonstrate improved SLS to >35 seconds CHINA to promote safety and decrease risk of falls on uneven surfaces such as grass  Pt will be able to squat to  light objects around the house with <0/10 hip pain   Pt will improve functional hip strength to report ability to ascend/descend 1 flight of stairs reciprocally without use of handrail  Pt will be independent and compliant with comprehensive HEP to maintain progress achieved in PT    Frequency / Duration: Patient will be seen for 2 x/week or a total of 20 visits over a 90 day period.  Treatment will include: Gait training, Manual Therapy, Neuromuscular Re-education, Self-Care Home Management, Therapeutic Activities, Therapeutic Exercise, Home Exercise Program instruction, and Modalities to include: as needed for pain modulation.     Education or treatment limitation: None  Rehab Potential:good    LEFS  Score  LEFS Score: 50 % (9/16/2024  4:29 PM)      Patient/Family/Caregiver was advised of these findings, precautions, and treatment options and has agreed to actively participate in planning and for this course of care.    Thank you for your referral. Please co-sign or sign and return this letter via fax as soon as possible to [phone]. If you have any questions, please contact me at Dept: [phone]    Sincerely,  Electronically signed by therapist: James Ashraf PT  Physician's certification required: Yes  I certify the need for these services furnished under this plan of treatment and while under my care.    X___________________________________________________ Date____________________    Certification From: 9/27/2024  To:12/26/2024

## 2024-10-02 ENCOUNTER — OFFICE VISIT (OUTPATIENT)
Dept: PHYSICAL THERAPY | Age: 59
End: 2024-10-02
Attending: ORTHOPAEDIC SURGERY
Payer: COMMERCIAL

## 2024-10-02 PROCEDURE — 97110 THERAPEUTIC EXERCISES: CPT

## 2024-10-03 NOTE — PROGRESS NOTES
Diagnosis:   Status post right hip replacement (Z96.641)       Referring Provider: Ruddy Sanchez  Date of Evaluation:     9/27/2024     Precautions:   anterior hip precautions  Next MD visit:   none scheduled  Date of Surgery: n/a   Insurance Primary/Secondary: [employer] [facility] / N/A     # Auth Visits: n/a            Subjective: Pt arrives to the clinic with reports of overall improvement when it comes to her mobility. Pt has been walking and completing her HEP on a consistent basis w/o use of cane. Pt does have some discomfort felt low back that travels into her R glute musculature.     Pain: 4/10 pain is located in the area of her low back and glute that is primarily R sided      Objective: Weakness that is primarily R sided when it comes to glute and single leg stance.   Gait: pt ambulates on level ground with decreased ne, antalgia, decreased step length R, decreased stance phase R, and decreased arm swing       Assessment: Pt responded well to skilled therapy session with minor reports of fatigue following exercise prescription and progression. Pt was progressed in CKC exercises that focused on glute and core strengthening. Pt has weakness most pronounced in her glute and hip flexor musculature on the R leading to an antalgic gait pattern and trunk lean.Pt will continue to benefit from skilled therapy to address the aforementioned deficits to improve functional mobility and return to PLOF with minimal symptoms.        Goals: (To be met in 20 visits)   Pt will have improved hip AROM Flex to 130 deg and ABD to 45 deg to be able to don/doff shoes and perform car transfers without difficulty  Pt will improve hip ABD and ER strength to 5/5 to increase ease with standing and walking   Pt will demonstrate improved SLS to >35 seconds CHINA to promote safety and decrease risk of falls on uneven surfaces such as grass  Pt will be able to squat to  light objects around the house with <0/10 hip pain   Pt  will improve functional hip strength to report ability to ascend/descend 1 flight of stairs reciprocally without use of handrail  Pt will be independent and compliant with comprehensive HEP to maintain progress achieved in PT    Plan: Progress per POC  Date: 10/2/2024  TX#: 2/10 Date:                 TX#: 3/ Date:                 TX#: 4/ Date:                 TX#: 5/ Date:   Tx#: 6/   NuStep x6        Standing hip flexion on 8 inch box 3x10 per side       Monster walks forward/backwards with red TB at ankles 3 laps       Standing hip abduction and extension on foam 3x10 per leg       Glute, ITB and vastus lateralis rolling       Clamshell 3x12 no resistance        HEP: Exercises  - Prone Quadriceps Stretch with Strap  - 1 x daily - 5-7 x weekly - 3 sets - 30-45 hold  - Standing Gastroc Stretch  - 1 x daily - 5-7 x weekly - 3 sets - 30-45 hold  - Mini Squat with Counter Support  - 1 x daily - 3-4 x weekly - 3 sets - 10 reps  - Standing March with Counter Support  - 1 x daily - 3-4 x weekly - 3 sets - 10-12 reps  - Sidelying Hip Abduction  - 1 x daily - 3-4 x weekly - 3 sets - 10-12 reps    Charges: TherX(3)       Total Timed Treatment: 45 min  Total Treatment Time: 45 min

## 2024-10-04 ENCOUNTER — OFFICE VISIT (OUTPATIENT)
Dept: PHYSICAL THERAPY | Age: 59
End: 2024-10-04
Attending: ORTHOPAEDIC SURGERY
Payer: COMMERCIAL

## 2024-10-04 PROCEDURE — 97110 THERAPEUTIC EXERCISES: CPT

## 2024-10-04 NOTE — PROGRESS NOTES
Diagnosis:   Status post right hip replacement (Z96.641)       Referring Provider: Ruddy Sanchez  Date of Evaluation:     9/27/2024     Precautions:   anterior hip precautions  Next MD visit:   none scheduled  Date of Surgery: n/a   Insurance Primary/Secondary: [employer] [facility] / N/A     # Auth Visits: n/a            Subjective: Pt arrives to the clinic with reports of overall improvement when it comes to her mobility. Pt states that she experienced some fatigue and muscle soreness following her last therapy session which lasted no more than a day. Pt does have some discomfort felt in her low back that travels into her R glute.     Pain: 3/10 pain is located in the area of her low back and glute that is primarily R sided      Objective: Weakness that is primarily R sided when it comes to glute and single leg stance.   Gait: pt ambulates on level ground with decreased ne, antalgia, decreased step length R, decreased stance phase R, and decreased arm swing       Assessment: Pt responded well to skilled therapy session with minor reports of fatigue following exercise prescription and progression. Pt has weakness most pronounced in her glute and hip flexor musculature on the R leading to an antalgic gait pattern and trunk lean. Pt will continue to benefit from skilled therapy to address the aforementioned deficits to improve functional mobility and return to PLOF with minimal symptoms.        Goals: (To be met in 20 visits)   Pt will have improved hip AROM Flex to 130 deg and ABD to 45 deg to be able to don/doff shoes and perform car transfers without difficulty  Pt will improve hip ABD and ER strength to 5/5 to increase ease with standing and walking   Pt will demonstrate improved SLS to >35 seconds CHINA to promote safety and decrease risk of falls on uneven surfaces such as grass  Pt will be able to squat to  light objects around the house with <0/10 hip pain   Pt will improve functional hip strength  to report ability to ascend/descend 1 flight of stairs reciprocally without use of handrail  Pt will be independent and compliant with comprehensive HEP to maintain progress achieved in PT    Plan: Progress per POC  Date: 10/2/2024  TX#: 2/10 Date:10/4/2024                 TX#: 3/10 Date:                 TX#: 4/ Date:                 TX#: 5/ Date:   Tx#: 6/   NuStep x6  NuStep x 8       Standing hip flexion on 8 inch box 3x10 per side Standing hip flexion on 8 inch box 3x10 per side      Monster walks forward/backwards with red TB at ankles 3 laps Monster walks forward/backwards with red TB at ankles 3 laps      Standing hip abduction and extension on foam 3x10 per leg TRX squats 3x12      Glute, ITB and vastus lateralis rolling Forward lunge on bosu in // bars 3x12 per leg      Clamshell 3x12 no resistance  Standing hip abduction and extension on foam 3x10 per leg      HEP: Exercises  - Prone Quadriceps Stretch with Strap  - 1 x daily - 5-7 x weekly - 3 sets - 30-45 hold  - Standing Gastroc Stretch  - 1 x daily - 5-7 x weekly - 3 sets - 30-45 hold  - Mini Squat with Counter Support  - 1 x daily - 3-4 x weekly - 3 sets - 10 reps  - Standing March with Counter Support  - 1 x daily - 3-4 x weekly - 3 sets - 10-12 reps  - Sidelying Hip Abduction  - 1 x daily - 3-4 x weekly - 3 sets - 10-12 reps    Charges: TherX(3)       Total Timed Treatment: 45 min  Total Treatment Time: 45 min

## 2024-10-09 ENCOUNTER — OFFICE VISIT (OUTPATIENT)
Dept: PHYSICAL THERAPY | Age: 59
End: 2024-10-09
Attending: ORTHOPAEDIC SURGERY
Payer: COMMERCIAL

## 2024-10-09 PROCEDURE — 97140 MANUAL THERAPY 1/> REGIONS: CPT

## 2024-10-09 PROCEDURE — 97110 THERAPEUTIC EXERCISES: CPT

## 2024-10-10 NOTE — PROGRESS NOTES
Diagnosis:   Status post right hip replacement (Z96.641)       Referring Provider: Ruddy Sanchez  Date of Evaluation:     9/27/2024     Precautions:   anterior hip precautions  Next MD visit:   none scheduled  Date of Surgery: n/a   Insurance Primary/Secondary: [employer] [facility] / N/A     # Auth Visits: n/a            Subjective: Pt arrives to the clinic with reports of overall improvement when it comes to her mobility. Pt states that she experienced some fatigue and muscle soreness following her last therapy session which lasted no more than a day. Pt does have some discomfort felt in her low back that travels into her R glute. Pt has been driving which she was previously avoiding due to R sided hip soreness.    Pain: 3/10 pain is located in the area of her low back and glute that is primarily R sided      Objective: Weakness that is primarily R sided when it comes to glute and single leg stance.   Gait: pt ambulates on level ground with decreased ne, antalgia, decreased step length R, decreased stance phase R, and decreased arm swing       Assessment: Pt responded well to skilled therapy session with minor reports of fatigue following exercise prescription and progression. Pt has weakness most pronounced in her glute and hip flexor musculature on the R leading to an antalgic gait pattern and slight trunk lean. Pt will continue to benefit from skilled therapy to address the aforementioned deficits to improve functional mobility and return to PLOF with minimal symptoms.        Goals: (To be met in 20 visits)   Pt will have improved hip AROM Flex to 130 deg and ABD to 45 deg to be able to don/doff shoes and perform car transfers without difficulty  Pt will improve hip ABD and ER strength to 5/5 to increase ease with standing and walking   Pt will demonstrate improved SLS to >35 seconds CHINA to promote safety and decrease risk of falls on uneven surfaces such as grass  Pt will be able to squat to  light  objects around the house with <0/10 hip pain   Pt will improve functional hip strength to report ability to ascend/descend 1 flight of stairs reciprocally without use of handrail  Pt will be independent and compliant with comprehensive HEP to maintain progress achieved in PT    Plan: Progress per POC  Date: 10/2/2024  TX#: 2/10 Date:10/4/2024                 TX#: 3/10 Date:10/9/2024                 TX#: 4/10 Date:                 TX#: 5/ Date:   Tx#: 6/   NuStep x6  NuStep x 8  NuStep x 8      Standing hip flexion on 8 inch box 3x10 per side Standing hip flexion on 8 inch box 3x10 per side Standing hip flexion on 8 inch box 3x10 per side     Monster walks forward/backwards with red TB at ankles 3 laps Monster walks forward/backwards with red TB at ankles 3 laps Monster walks forward/backwards with red TB at ankles 3 laps     Standing hip abduction and extension on foam 3x10 per leg TRX squats 3x12 Standing hip abduction and extension on foam 3x10 per leg     Glute, ITB and vastus lateralis rolling Forward lunge on bosu in // bars 3x12 per leg TRX squats 3x12     Clamshell 3x12 no resistance  Standing hip abduction and extension on foam 3x10 per leg ITB, Glute, and vastus lateralis rolling     HEP: Exercises  - Prone Quadriceps Stretch with Strap  - 1 x daily - 5-7 x weekly - 3 sets - 30-45 hold  - Standing Gastroc Stretch  - 1 x daily - 5-7 x weekly - 3 sets - 30-45 hold  - Mini Squat with Counter Support  - 1 x daily - 3-4 x weekly - 3 sets - 10 reps  - Standing March with Counter Support  - 1 x daily - 3-4 x weekly - 3 sets - 10-12 reps  - Sidelying Hip Abduction  - 1 x daily - 3-4 x weekly - 3 sets - 10-12 reps    Charges: TherX(2)   MT(1)    Total Timed Treatment: 45 min  Total Treatment Time: 45 min

## 2024-10-11 ENCOUNTER — PATIENT MESSAGE (OUTPATIENT)
Dept: ORTHOPEDICS CLINIC | Facility: CLINIC | Age: 59
End: 2024-10-11

## 2024-10-11 ENCOUNTER — OFFICE VISIT (OUTPATIENT)
Dept: PHYSICAL THERAPY | Age: 59
End: 2024-10-11
Attending: ORTHOPAEDIC SURGERY
Payer: COMMERCIAL

## 2024-10-11 DIAGNOSIS — M16.0 PRIMARY OSTEOARTHRITIS OF BOTH HIPS: ICD-10-CM

## 2024-10-11 PROCEDURE — 97140 MANUAL THERAPY 1/> REGIONS: CPT

## 2024-10-11 PROCEDURE — 97110 THERAPEUTIC EXERCISES: CPT

## 2024-10-11 RX ORDER — TRAMADOL HYDROCHLORIDE 50 MG/1
50 TABLET ORAL EVERY 6 HOURS PRN
Qty: 60 TABLET | Refills: 0 | Status: SHIPPED | OUTPATIENT
Start: 2024-10-11

## 2024-10-11 NOTE — TELEPHONE ENCOUNTER
Refill request~    Tramadol 50 mg    DOS: 09/12/2024  Last OV: 09/25/2024  Last refill date: 09/25/2024     #/refills: 45/0  Upcoming appt: 10/23/2024      Please note that patient is requesting change in amount and dosage instructions.      Could you please renew my Tramadol? While my right hip is feeling much better, my left one is feeling worse. If possible, could you call in the script with instructions to take it every 6 hours like you did before my surgery? I believe it was 60 pills every 6 hours. However if there’s a chance that you can call in more than that at one time so I don’t have to keep going back to [facility] I would greatly appreciate it.           Medication Detail      Medication Quantity Refills Start End   traMADol 50 MG Oral Tab 45 tablet 0 9/25/2024 --   Sig:   Take 1 tablet (50 mg total) by mouth every 8 (eight) hours as needed for Pain.     Route:   Oral     PRN Reason(s):   Pain

## 2024-10-11 NOTE — PROGRESS NOTES
Diagnosis:   Status post right hip replacement (Z96.641)       Referring Provider: Ruddy Sanchez  Date of Evaluation:     9/27/2024     Precautions:   anterior hip precautions  Next MD visit:   none scheduled  Date of Surgery: n/a   Insurance Primary/Secondary: [employer] [facility] / N/A     # Auth Visits: n/a            Subjective: Pt arrives to the clinic with reports of overall improvement when it comes to her mobility. Pt states that she experienced some fatigue and muscle soreness following her last therapy session which lasted no more than a day. Pt was able to stand for prolonged periods of time while at a work dinner yesterday w/o an increase in hip pain. Pt does have some discomfort felt in her low back that travels into her R glute.     Pain: 3/10 pain is located in the area of her low back and glute that is primarily R sided      Objective: Weakness that is primarily R sided when it comes to glute and single leg stance.   Gait: pt ambulates on level ground with decreased ne, antalgia, decreased step length R, decreased stance phase R, and decreased arm swing       Assessment: Pt responded well to skilled therapy session with minor reports of fatigue following exercise prescription and progression. Pt will continue to benefit from skilled therapy to address the aforementioned deficits to improve functional mobility and return to PLOF with minimal symptoms.        Goals: (To be met in 20 visits)   Pt will have improved hip AROM Flex to 130 deg and ABD to 45 deg to be able to don/doff shoes and perform car transfers without difficulty  Pt will improve hip ABD and ER strength to 5/5 to increase ease with standing and walking   Pt will demonstrate improved SLS to >35 seconds CHINA to promote safety and decrease risk of falls on uneven surfaces such as grass  Pt will be able to squat to  light objects around the house with <0/10 hip pain   Pt will improve functional hip strength to report ability to  ascend/descend 1 flight of stairs reciprocally without use of handrail  Pt will be independent and compliant with comprehensive HEP to maintain progress achieved in PT    Plan: Progress per POC  Date: 10/2/2024  TX#: 2/10 Date:10/4/2024                 TX#: 3/10 Date:10/9/2024                 TX#: 4/10 Date:10/11/2024                 TX#: 5/10 Date:   Tx#: 6/   NuStep x6  NuStep x 8  NuStep x 8  NuStep x 8     Standing hip flexion on 8 inch box 3x10 per side Standing hip flexion on 8 inch box 3x10 per side Standing hip flexion on 8 inch box 3x10 per side Forward lunge on bosu 3x10 per leg    Monster walks forward/backwards with red TB at ankles 3 laps Monster walks forward/backwards with red TB at ankles 3 laps Monster walks forward/backwards with red TB at ankles 3 laps Monster walks forward/backwards with red TB at ankles 3 laps    Standing hip abduction and extension on foam 3x10 per leg TRX squats 3x12 Standing hip abduction and extension on foam 3x10 per leg Standing hip abduction and extension on foam 3x10 per leg    Glute, ITB and vastus lateralis rolling Forward lunge on bosu in // bars 3x12 per leg TRX squats 3x12 Suit case marches using 10 lbs KB 3x12    Clamshell 3x12 no resistance  Standing hip abduction and extension on foam 3x10 per leg ITB, Glute, and vastus lateralis rolling ITB, Glute, and vastus lateralis rolling    HEP: Exercises  - Prone Quadriceps Stretch with Strap  - 1 x daily - 5-7 x weekly - 3 sets - 30-45 hold  - Standing Gastroc Stretch  - 1 x daily - 5-7 x weekly - 3 sets - 30-45 hold  - Mini Squat with Counter Support  - 1 x daily - 3-4 x weekly - 3 sets - 10 reps  - Standing March with Counter Support  - 1 x daily - 3-4 x weekly - 3 sets - 10-12 reps  - Sidelying Hip Abduction  - 1 x daily - 3-4 x weekly - 3 sets - 10-12 reps    Charges: TherX(2)   MT(1)    Total Timed Treatment: 45 min  Total Treatment Time: 45 min

## 2024-10-16 ENCOUNTER — OFFICE VISIT (OUTPATIENT)
Dept: PHYSICAL THERAPY | Age: 59
End: 2024-10-16
Attending: ORTHOPAEDIC SURGERY
Payer: COMMERCIAL

## 2024-10-16 PROCEDURE — 97110 THERAPEUTIC EXERCISES: CPT

## 2024-10-16 PROCEDURE — 97140 MANUAL THERAPY 1/> REGIONS: CPT

## 2024-10-18 ENCOUNTER — OFFICE VISIT (OUTPATIENT)
Dept: PHYSICAL THERAPY | Age: 59
End: 2024-10-18
Attending: ORTHOPAEDIC SURGERY
Payer: COMMERCIAL

## 2024-10-18 PROCEDURE — 97140 MANUAL THERAPY 1/> REGIONS: CPT

## 2024-10-18 PROCEDURE — 97110 THERAPEUTIC EXERCISES: CPT

## 2024-10-18 NOTE — PROGRESS NOTES
Diagnosis:   Status post right hip replacement (Z96.641)       Referring Provider: Ruddy Sanchez  Date of Evaluation:     9/27/2024     Precautions:   anterior hip precautions  Next MD visit:   none scheduled  Date of Surgery: n/a   Insurance Primary/Secondary: [employer] / N/A     # Auth Visits: n/a            Subjective: Pt arrives to the clinic with reports of overall improvement when it comes to her mobility. Pt states that she has been attending the gym and completing light exercise and cardio w/o an increase in hip discomfort. Pt continues to endorse some discomfort felt in her low back that travels into her L glute.     Pain: 2/10 pain is located in the area of her low back and glute that is primarily R sided      Objective: Weakness that is primarily R sided when it comes to glute and single leg stance.   Gait: pt ambulates on level ground with decreased ne, decreased step length R, decreased stance phase R, and decreased arm swing       Assessment: Pt responded well to skilled therapy session with minor reports of fatigue following exercise prescription and progression. Pt was progressed in CKC exercises that focused on glute strength and SLS. Pt will continue to benefit from skilled therapy to address the aforementioned deficits to improve functional mobility and return to PLOF with minimal symptoms.        Goals: (To be met in 20 visits)   Pt will have improved hip AROM Flex to 130 deg and ABD to 45 deg to be able to don/doff shoes and perform car transfers without difficulty  Pt will improve hip ABD and ER strength to 5/5 to increase ease with standing and walking   Pt will demonstrate improved SLS to >35 seconds CHINA to promote safety and decrease risk of falls on uneven surfaces such as grass  Pt will be able to squat to  light objects around the house with <0/10 hip pain   Pt will improve functional hip strength to report ability to ascend/descend 1 flight of stairs reciprocally  without use of handrail  Pt will be independent and compliant with comprehensive HEP to maintain progress achieved in PT    Plan: Progress per POC and continue glute strengthening exercises  Date: 10/2/2024  TX#: 2/10 Date:10/4/2024                 TX#: 3/10 Date:10/9/2024                 TX#: 4/10 Date:10/11/2024                 TX#: 5/10 Date:10/16/2024   Tx#: 6/   NuStep x6  NuStep x 8  NuStep x 8  NuStep x 8  NuStep x 8    Standing hip flexion on 8 inch box 3x10 per side Standing hip flexion on 8 inch box 3x10 per side Standing hip flexion on 8 inch box 3x10 per side Forward lunge on bosu 3x10 per leg Forward hip flexion on box 3x10 per leg   Monster walks forward/backwards with red TB at ankles 3 laps Monster walks forward/backwards with red TB at ankles 3 laps Monster walks forward/backwards with red TB at ankles 3 laps Monster walks forward/backwards with red TB at ankles 3 laps Standing hip abduction and extension on foam 3x10 per leg   Standing hip abduction and extension on foam 3x10 per leg TRX squats 3x12 Standing hip abduction and extension on foam 3x10 per leg Standing hip abduction and extension on foam 3x10 per leg TRX squats 3x12   Glute, ITB and vastus lateralis rolling Forward lunge on bosu in // bars 3x12 per leg TRX squats 3x12 Suit case marches using 10 lbs KB 3x12 Suit case marches using 10 lbs KB 3x12   Clamshell 3x12 no resistance  Standing hip abduction and extension on foam 3x10 per leg ITB, Glute, and vastus lateralis rolling ITB, Glute, and vastus lateralis rolling ITB, Glute, and vastus lateralis rolling   HEP: Exercises  - Prone Quadriceps Stretch with Strap  - 1 x daily - 5-7 x weekly - 3 sets - 30-45 hold  - Standing Gastroc Stretch  - 1 x daily - 5-7 x weekly - 3 sets - 30-45 hold  - Mini Squat with Counter Support  - 1 x daily - 3-4 x weekly - 3 sets - 10 reps  - Standing March with Counter Support  - 1 x daily - 3-4 x weekly - 3 sets - 10-12 reps  - Sidelying Hip Abduction  - 1 x  daily - 3-4 x weekly - 3 sets - 10-12 reps    Charges: TherX(2)   MT(1)    Total Timed Treatment: 45 min  Total Treatment Time: 45 min

## 2024-10-18 NOTE — PROGRESS NOTES
Diagnosis:   Status post right hip replacement (Z96.641)       Referring Provider: Ruddy Sanchez  Date of Evaluation:     9/27/2024     Precautions:   anterior hip precautions  Next MD visit:   none scheduled  Date of Surgery: n/a   Insurance Primary/Secondary: [employer] / N/A     # Auth Visits: n/a            Subjective: Pt arrives to the clinic with reports of overall improvement when it comes to her mobility. Pt states that she was able to complete 30 minutes on the elliptical level 1 at the gym yesterday. Pt continues to endorse some discomfort felt in her low back that travels into her L glute as well as some \"shin pain\" on the R. Pt states that she has a follow-up visit with her PCP on Wednesday the 23 rd.    Pain: 1/10 pain is located in the area of her low back and glute that is primarily R sided      Objective: Weakness that is primarily R sided when it comes to glute and single leg stance.   Gait: pt ambulates on level ground with decreased ne, decreased step length R, decreased stance phase R, and decreased arm swing       Assessment: Pt responded well to skilled therapy session with minor reports of fatigue following exercise prescription and progression. Pt was progressed in CKC exercises that focused on glute strength and SLS. Pt will continue to benefit from skilled therapy to address the aforementioned deficits to improve functional mobility and return to PLOF with minimal symptoms.        Goals: (To be met in 20 visits)   Pt will have improved hip AROM Flex to 130 deg and ABD to 45 deg to be able to don/doff shoes and perform car transfers without difficulty  Pt will improve hip ABD and ER strength to 5/5 to increase ease with standing and walking   Pt will demonstrate improved SLS to >35 seconds CHINA to promote safety and decrease risk of falls on uneven surfaces such as grass  Pt will be able to squat to  light objects around the house with <0/10 hip pain   Pt will improve  functional hip strength to report ability to ascend/descend 1 flight of stairs reciprocally without use of handrail  Pt will be independent and compliant with comprehensive HEP to maintain progress achieved in PT    Plan: Progress per POC and continue glute strengthening exercises  Date: 10/2/2024  TX#: 2/10 Date:10/4/2024                 TX#: 3/10 Date:10/9/2024                 TX#: 4/10 Date:10/11/2024                 TX#: 5/10 Date:10/16/2024   Tx#: 6/10 Date:10/18/2024   Tx#: 7/10   NuStep x6  NuStep x 8  NuStep x 8  NuStep x 8  NuStep x 8  NuStep x 8 min   Standing hip flexion on 8 inch box 3x10 per side Standing hip flexion on 8 inch box 3x10 per side Standing hip flexion on 8 inch box 3x10 per side Forward lunge on bosu 3x10 per leg Forward hip flexion on box 3x10 per leg Lateral band walks 2 laps using blue TB at knees    Monster walks forward/backwards with red TB at ankles 3 laps Monster walks forward/backwards with red TB at ankles 3 laps Monster walks forward/backwards with red TB at ankles 3 laps Monster walks forward/backwards with red TB at ankles 3 laps Standing hip abduction and extension on foam 3x10 per leg Monster walks forward/backwards with blue TB at knees 3 laps   Standing hip abduction and extension on foam 3x10 per leg TRX squats 3x12 Standing hip abduction and extension on foam 3x10 per leg Standing hip abduction and extension on foam 3x10 per leg TRX squats 3x12 Slant board PF/DF and Inv/Vilma x30 each    Glute, ITB and vastus lateralis rolling Forward lunge on bosu in // bars 3x12 per leg TRX squats 3x12 Suit case marches using 10 lbs KB 3x12 Suit case marches using 10 lbs KB 3x12 Bridges on exercise ball 3x10   Clamshell 3x12 no resistance  Standing hip abduction and extension on foam 3x10 per leg ITB, Glute, and vastus lateralis rolling ITB, Glute, and vastus lateralis rolling ITB, Glute, and vastus lateralis rolling ITB, Glute, and vastus lateralis rolling   HEP: Exercises  - Prone  Quadriceps Stretch with Strap  - 1 x daily - 5-7 x weekly - 3 sets - 30-45 hold  - Standing Gastroc Stretch  - 1 x daily - 5-7 x weekly - 3 sets - 30-45 hold  - Mini Squat with Counter Support  - 1 x daily - 3-4 x weekly - 3 sets - 10 reps  - Standing March with Counter Support  - 1 x daily - 3-4 x weekly - 3 sets - 10-12 reps  - Sidelying Hip Abduction  - 1 x daily - 3-4 x weekly - 3 sets - 10-12 reps    Charges: TherX(2)   MT(1)    Total Timed Treatment: 45 min  Total Treatment Time: 45 min

## 2024-10-22 ENCOUNTER — TELEPHONE (OUTPATIENT)
Facility: CLINIC | Age: 59
End: 2024-10-22

## 2024-10-22 ENCOUNTER — MED REC SCAN ONLY (OUTPATIENT)
Dept: ORTHOPEDICS CLINIC | Facility: CLINIC | Age: 59
End: 2024-10-22

## 2024-10-22 DIAGNOSIS — Z96.641 STATUS POST RIGHT HIP REPLACEMENT: Primary | ICD-10-CM

## 2024-10-23 ENCOUNTER — OFFICE VISIT (OUTPATIENT)
Dept: PHYSICAL THERAPY | Age: 59
End: 2024-10-23
Attending: ORTHOPAEDIC SURGERY
Payer: COMMERCIAL

## 2024-10-23 ENCOUNTER — OFFICE VISIT (OUTPATIENT)
Dept: ORTHOPEDICS CLINIC | Facility: CLINIC | Age: 59
End: 2024-10-23
Payer: COMMERCIAL

## 2024-10-23 ENCOUNTER — HOSPITAL ENCOUNTER (OUTPATIENT)
Dept: GENERAL RADIOLOGY | Age: 59
Discharge: HOME | End: 2024-10-23
Attending: ORTHOPAEDIC SURGERY
Payer: COMMERCIAL

## 2024-10-23 DIAGNOSIS — Z96.641 STATUS POST RIGHT HIP REPLACEMENT: ICD-10-CM

## 2024-10-23 DIAGNOSIS — Z96.641 STATUS POST RIGHT HIP REPLACEMENT: Primary | ICD-10-CM

## 2024-10-23 PROCEDURE — 99024 POSTOP FOLLOW-UP VISIT: CPT | Performed by: ORTHOPAEDIC SURGERY

## 2024-10-23 PROCEDURE — 97110 THERAPEUTIC EXERCISES: CPT

## 2024-10-23 PROCEDURE — 73502 X-RAY EXAM HIP UNI 2-3 VIEWS: CPT | Performed by: ORTHOPAEDIC SURGERY

## 2024-10-23 PROCEDURE — 97140 MANUAL THERAPY 1/> REGIONS: CPT

## 2024-10-23 NOTE — PROGRESS NOTES
[facility] Progress Note    Subjective: Returns to clinic 6 weeks postop from anterior right total hip arthroplasty.  Reports she is doing well, hip and buttock/low back pain on the right side have improved following surgery.  Knee pain is better as well.  Incision has been healing well, no drainage.  She is not taking any narcotic pain medications, only occasional anti-inflammatory.  She has been ambulating without assist device since before the 2-week postop appointment.  She has 1 day of aspirin DVT prophylaxis remaining.  Looking forward to left hip replacement in a month and a half.    Objective: Patient appears comfortable, very pleasant.  Right hip incision is well-healed.  Ambulating with normal nonantalgic gait.      Imaging: X-rays of the right hip and pelvis personally viewed, independently interpreted and radiology report read.  Uncemented right total hip arthroplasty unchanged from postoperative films.  Cup abduction just under 40 degrees by few degrees, leg lengths within about 6 mm longer on the operative side.  Anteversion 20 to 25 degrees.  No subsidence of the stem compared to postoperative films.      Assessment/Plan: 59-year-old female 6 weeks postop status post anterior right total hip arthroplasty.  Doing very well.  Continue activities as tolerated.  No restrictions on the incision at this point.  Scheduled for left hip replacement in just under 2 months.  She can continue Celebrex up until then as well.    Ruddy Sanchez MD, [facility]  [facility]  Phone [phone]  Fax [phone]

## 2024-10-25 ENCOUNTER — OFFICE VISIT (OUTPATIENT)
Dept: PHYSICAL THERAPY | Age: 59
End: 2024-10-25
Attending: ORTHOPAEDIC SURGERY
Payer: COMMERCIAL

## 2024-10-25 PROCEDURE — 97110 THERAPEUTIC EXERCISES: CPT

## 2024-10-25 PROCEDURE — 97140 MANUAL THERAPY 1/> REGIONS: CPT

## 2024-10-25 NOTE — PROGRESS NOTES
Diagnosis:   Status post right hip replacement (Z96.641)       Referring Provider: Ruddy Sanchez  Date of Evaluation:     9/27/2024     Precautions:   anterior hip precautions  Next MD visit:   none scheduled  Date of Surgery: n/a   Insurance Primary/Secondary: [employer] / N/A     # Auth Visits: n/a            Subjective: Pt arrives to the clinic with reports of overall fatigue from a long day. Pt states that she was able to complete 30 minutes on the elliptical level 2 at the gym this morning. Pt continues to endorse some discomfort felt in her shin on the R. Pt states that she has a follow-up visit with her PCP earlier today with overall good report and progress. No other concerns at this time.    Pain: 1/10 pain is located in the area of her low back and glute that is primarily R sided      Objective: Weakness that is primarily R sided when it comes to glute and single leg stance.   Gait: pt ambulates on level ground with decreased ne, decreased step length R, decreased stance phase R, and decreased arm swing. Tightness of hamstring, quad and hip flexor musculature on the R.      Assessment: Pt responded well to skilled therapy session with reports of improvement in her energy levels following exercise prescription and progression. Pt was progressed in CKC exercises that focused on glute strength and SLS. Pt will continue to benefit from skilled therapy to address the aforementioned deficits to improve functional mobility and return to PLOF with minimal symptoms.        Goals: (To be met in 20 visits)   Pt will have improved hip AROM Flex to 130 deg and ABD to 45 deg to be able to don/doff shoes and perform car transfers without difficulty  Pt will improve hip ABD and ER strength to 5/5 to increase ease with standing and walking   Pt will demonstrate improved SLS to >35 seconds CHINA to promote safety and decrease risk of falls on uneven surfaces such as grass  Pt will be able to squat to   light objects around the house with <0/10 hip pain   Pt will improve functional hip strength to report ability to ascend/descend 1 flight of stairs reciprocally without use of handrail  Pt will be independent and compliant with comprehensive HEP to maintain progress achieved in PT    Plan: Progress per POC and continue glute strengthening exercises  Date:10/9/2024                 TX#: 4/10 Date:10/11/2024                 TX#: 5/10 Date:10/16/2024   Tx#: 6/10 Date:10/18/2024   Tx#: 7/10 Date:10/23/2024   Tx#: 8/10   NuStep x 8  NuStep x 8  NuStep x 8  NuStep x 8 min NuStep x 6 min   Standing hip flexion on 8 inch box 3x10 per side Forward lunge on bosu 3x10 per leg Forward hip flexion on box 3x10 per leg Lateral band walks 2 laps using blue TB at knees  Forward lunge on bosu 3x10 per leg in // bars w/ UE assist   Monster walks forward/backwards with red TB at ankles 3 laps Monster walks forward/backwards with red TB at ankles 3 laps Standing hip abduction and extension on foam 3x10 per leg Monster walks forward/backwards with blue TB at knees 3 laps Standing hip abduction and extension on foam 3x10 per leg   Standing hip abduction and extension on foam 3x10 per leg Standing hip abduction and extension on foam 3x10 per leg TRX squats 3x12 Slant board PF/DF and Inv/Vilma x30 each  Slant board PF/DF and Inv/Vilma x30 each    TRX squats 3x12 Suit case marches using 10 lbs KB 3x12 Suit case marches using 10 lbs KB 3x12 Bridges on exercise ball 3x10 Bridges on exercise ball 3x10   ITB, Glute, and vastus lateralis rolling ITB, Glute, and vastus lateralis rolling ITB, Glute, and vastus lateralis rolling ITB, Glute, and vastus lateralis rolling ITB, Glute, and vastus lateralis rolling   HEP: Exercises  - Prone Quadriceps Stretch with Strap  - 1 x daily - 5-7 x weekly - 3 sets - 30-45 hold  - Standing Gastroc Stretch  - 1 x daily - 5-7 x weekly - 3 sets - 30-45 hold  - Mini Squat with Counter Support  - 1 x daily - 3-4 x  weekly - 3 sets - 10 reps  - Standing March with Counter Support  - 1 x daily - 3-4 x weekly - 3 sets - 10-12 reps  - Sidelying Hip Abduction  - 1 x daily - 3-4 x weekly - 3 sets - 10-12 reps    Charges: TherX(2)   MT(1)    Total Timed Treatment: 45 min  Total Treatment Time: 45 min

## 2024-10-25 NOTE — PROGRESS NOTES
Diagnosis:   Status post right hip replacement (Z96.641)       Referring Provider: Ruddy Sanchez  Date of Evaluation:     9/27/2024     Precautions:   anterior hip precautions  Next MD visit:   none scheduled  Date of Surgery: n/a   Insurance Primary/Secondary: [employer] [facility] / N/A     # Auth Visits: n/a            Subjective: Pt arrives to the clinic with reports of feeling good with \"tightness\" that is primarily felt on her anterior hip on the R(hip flexors/quads). Pt continues to endorse some discomfort felt in her shin on the R which comes and goes in nature. No other concerns at this time.    Pain: 1/10 pain is located in the area of her low back and glute that is primarily R side.       Objective: Weakness that is primarily R sided when it comes to glute and single leg stance.   Gait: pt ambulates on level ground with decreased ne, decreased step length R, decreased stance phase R, and decreased arm swing. Tightness of hamstring, quad and hip flexor musculature on the R.      Assessment: Pt responded well to skilled therapy session with reports of fatigue following exercise prescription and progression. Pt was progressed in CKC exercises that focused on glute strength and SLS. Pt was able to complete wall squats w/o increase in hip pain just feelings of fatigue/muscle burning. Pt was reminded importance of HEP completion and consistency in order to maximize overall progress. Pt will continue to benefit from skilled therapy to address the aforementioned deficits to improve functional mobility and return to PLOF with minimal symptoms.        Goals: (To be met in 20 visits)   Pt will have improved hip AROM Flex to 130 deg and ABD to 45 deg to be able to don/doff shoes and perform car transfers without difficulty  Pt will improve hip ABD and ER strength to 5/5 to increase ease with standing and walking   Pt will demonstrate improved SLS to >35 seconds CHINA to promote safety and decrease risk of falls on  uneven surfaces such as grass  Pt will be able to squat to  light objects around the house with <0/10 hip pain   Pt will improve functional hip strength to report ability to ascend/descend 1 flight of stairs reciprocally without use of handrail  Pt will be independent and compliant with comprehensive HEP to maintain progress achieved in PT    Plan: Progress per POC and continue glute strengthening exercises  Date:10/9/2024                 TX#: 4/10 Date:10/11/2024                 TX#: 5/10 Date:10/16/2024   Tx#: 6/10 Date:10/18/2024   Tx#: 7/10 Date:10/23/2024   Tx#: 8/10 Date:10/25/2024   Tx#: 9/10   NuStep x 8  NuStep x 8  NuStep x 8  NuStep x 8 min NuStep x 6 min NuStep x 8 min   Standing hip flexion on 8 inch box 3x10 per side Forward lunge on bosu 3x10 per leg Forward hip flexion on box 3x10 per leg Lateral band walks 2 laps using blue TB at knees  Forward lunge on bosu 3x10 per leg in // bars w/ UE assist Shuttle squats 3x12 lvl 6   Monster walks forward/backwards with red TB at ankles 3 laps Monster walks forward/backwards with red TB at ankles 3 laps Standing hip abduction and extension on foam 3x10 per leg Monster walks forward/backwards with blue TB at knees 3 laps Standing hip abduction and extension on foam 3x10 per leg Monster walks w/ TB at ankle yellow TB 3 laps    Standing hip abduction and extension on foam 3x10 per leg Standing hip abduction and extension on foam 3x10 per leg TRX squats 3x12 Slant board PF/DF and Inv/Vilma x30 each  Slant board PF/DF and Inv/Vilma x30 each  Wall squats using exercise ball against wall 3x12   TRX squats 3x12 Suit case marches using 10 lbs KB 3x12 Suit case marches using 10 lbs KB 3x12 Bridges on exercise ball 3x10 Bridges on exercise ball 3x10 ITB, Glute, and vastus lateralis rolling   ITB, Glute, and vastus lateralis rolling ITB, Glute, and vastus lateralis rolling ITB, Glute, and vastus lateralis rolling ITB, Glute, and vastus lateralis rolling ITB, Glute,  and vastus lateralis rolling Hamstring, gastroc, quad and hip flexor stretches 2x45 each    HEP: Exercises  - Prone Quadriceps Stretch with Strap  - 1 x daily - 5-7 x weekly - 3 sets - 30-45 hold  - Standing Gastroc Stretch  - 1 x daily - 5-7 x weekly - 3 sets - 30-45 hold  - Mini Squat with Counter Support  - 1 x daily - 3-4 x weekly - 3 sets - 10 reps  - Standing March with Counter Support  - 1 x daily - 3-4 x weekly - 3 sets - 10-12 reps  - Sidelying Hip Abduction  - 1 x daily - 3-4 x weekly - 3 sets - 10-12 reps    Charges: TherX(2)   MT(1)    Total Timed Treatment: 45 min  Total Treatment Time: 45 min

## 2024-10-29 ENCOUNTER — PATIENT MESSAGE (OUTPATIENT)
Dept: ORTHOPEDICS CLINIC | Facility: CLINIC | Age: 59
End: 2024-10-29

## 2024-10-29 DIAGNOSIS — Z96.642 STATUS POST LEFT HIP REPLACEMENT: Primary | ICD-10-CM

## 2024-10-30 ENCOUNTER — OFFICE VISIT (OUTPATIENT)
Dept: PHYSICAL THERAPY | Age: 59
End: 2024-10-30
Attending: ORTHOPAEDIC SURGERY
Payer: COMMERCIAL

## 2024-10-30 PROCEDURE — 97140 MANUAL THERAPY 1/> REGIONS: CPT

## 2024-10-30 PROCEDURE — 97110 THERAPEUTIC EXERCISES: CPT

## 2024-10-30 RX ORDER — ATORVASTATIN CALCIUM 10 MG/1
10 TABLET, FILM COATED ORAL DAILY
COMMUNITY
End: 2024-11-03

## 2024-10-30 RX ORDER — SCOLOPAMINE TRANSDERMAL SYSTEM 1 MG/1
1 PATCH, EXTENDED RELEASE TRANSDERMAL ONCE
Status: CANCELLED | OUTPATIENT
Start: 2024-10-30 | End: 2024-10-30

## 2024-11-01 ENCOUNTER — MED REC SCAN ONLY (OUTPATIENT)
Dept: ORTHOPEDICS CLINIC | Facility: CLINIC | Age: 59
End: 2024-11-01

## 2024-11-01 ENCOUNTER — OFFICE VISIT (OUTPATIENT)
Dept: PHYSICAL THERAPY | Age: 59
End: 2024-11-01
Attending: ORTHOPAEDIC SURGERY
Payer: COMMERCIAL

## 2024-11-01 PROCEDURE — 97110 THERAPEUTIC EXERCISES: CPT

## 2024-11-01 PROCEDURE — 97140 MANUAL THERAPY 1/> REGIONS: CPT

## 2024-11-01 NOTE — PROGRESS NOTES
Diagnosis:   Status post right hip replacement (Z96.641)       Referring Provider: Ruddy Sanchez  Date of Evaluation:     9/27/2024     Precautions:   anterior hip precautions  Next MD visit:   none scheduled  Date of Surgery: n/a   Insurance Primary/Secondary: [employer] [facility] / N/A     # Auth Visits: n/a            Subjective: Pt arrives to the clinic with reports of widespread soreness and with \"tightness\" that is primarily felt on her anterior hip on the R(hip flexors/quads). Pt states that her widespread soreness may be from increasing her exercise intensity while at the gym. Pt denies an increase in R sided hip pain. No other concerns at this time.    Pain: 1/10 pain is located in the area of her low back and glute that is primarily R side. Pt also reports some neck discomfort and stiffness which could be due to an increase in exercise intensity using the elliptical.        Objective: Weakness that is primarily R sided when it comes to glute and single leg stance.   Gait: pt ambulates on level ground with decreased ne, decreased step length R, decreased stance phase R, and decreased arm swing. Tightness of hamstring, quad and hip flexor musculature on the R.      Assessment: Pt responded well to skilled therapy session with reports of a decrease in her soreness and tightness following exercise and manual prescription. Pt presents with soft tissue restrictions throughout her BLE musuculature with her iliopsoas being most restricted. Pt reported an improvement in her symptoms following manual therapy. Pt will be reevaluated next visit to track overall progress.        Goals: (To be met in 20 visits)   Pt will have improved hip AROM Flex to 130 deg and ABD to 45 deg to be able to don/doff shoes and perform car transfers without difficulty  Pt will improve hip ABD and ER strength to 5/5 to increase ease with standing and walking   Pt will demonstrate improved SLS to >35 seconds CHINA to promote safety and  decrease risk of falls on uneven surfaces such as grass  Pt will be able to squat to  light objects around the house with <0/10 hip pain   Pt will improve functional hip strength to report ability to ascend/descend 1 flight of stairs reciprocally without use of handrail  Pt will be independent and compliant with comprehensive HEP to maintain progress achieved in PT    Plan: Progress per POC and continue glute strengthening exercises. Track overall progress and complete questionnaire.   Date:10/16/2024   Tx#: 6/10 Date:10/18/2024   Tx#: 7/10 Date:10/23/2024   Tx#: 8/10 Date:10/25/2024   Tx#: 9/10 Date:11/1/2024   Tx#: 10/12   NuStep x 8  NuStep x 8 min NuStep x 6 min NuStep x 8 min NuStep x 8 min   Forward hip flexion on box 3x10 per leg Lateral band walks 2 laps using blue TB at knees  Forward lunge on bosu 3x10 per leg in // bars w/ UE assist Shuttle squats 3x12 lvl 6 Bridges using exercise ball 3x12   Standing hip abduction and extension on foam 3x10 per leg Monster walks forward/backwards with blue TB at knees 3 laps Standing hip abduction and extension on foam 3x10 per leg Monster walks w/ TB at ankle yellow TB 3 laps  Slant board PF/DF and Inv/Vilma x30 each    TRX squats 3x12 Slant board PF/DF and Inv/Vilma x30 each  Slant board PF/DF and Inv/Vilma x30 each  Wall squats using exercise ball against wall 3x12 ITB, Glute, and vastus lateralis rolling   Suit case marches using 10 lbs KB 3x12 Bridges on exercise ball 3x10 Bridges on exercise ball 3x10 ITB, Glute, and vastus lateralis rolling Hamstring, gastroc, quad and hip flexor stretches 2x45 each    ITB, Glute, and vastus lateralis rolling ITB, Glute, and vastus lateralis rolling ITB, Glute, and vastus lateralis rolling Hamstring, gastroc, quad and hip flexor stretches 2x45 each  Manual strumming +sustained pressure of iliopsoas musculature bilaterally   HEP: Exercises  - Prone Quadriceps Stretch with Strap  - 1 x daily - 5-7 x weekly - 3 sets - 30-45  hold  - Standing Gastroc Stretch  - 1 x daily - 5-7 x weekly - 3 sets - 30-45 hold  - Mini Squat with Counter Support  - 1 x daily - 3-4 x weekly - 3 sets - 10 reps  - Standing March with Counter Support  - 1 x daily - 3-4 x weekly - 3 sets - 10-12 reps  - Sidelying Hip Abduction  - 1 x daily - 3-4 x weekly - 3 sets - 10-12 reps    Charges: TherX(1)   MT(2)    Total Timed Treatment: 45 min  Total Treatment Time: 45 min

## 2024-11-01 NOTE — PROGRESS NOTES
Diagnosis:   Status post right hip replacement (Z96.641)       Referring Provider: Ruddy Sanchez  Date of Evaluation:     9/27/2024     Precautions:   anterior hip precautions  Next MD visit:   none scheduled  Date of Surgery: n/a   Insurance Primary/Secondary: [employer] [facility] / N/A     # Auth Visits: n/a            Subjective: Pt arrives to the clinic with reports of some neck discomfort and stiffness but states her R hip has no pain at this time w/ feelings of \"tightness\" in the front. Pt has one more therapy session next week, so goals and overall progress will be measured. No other concerns at this time.    Pain: 1/10 pain is located in the area of her low back and glute that is primarily R side. Pt also reports some neck discomfort and stiffness which could be due to an increase in exercise intensity and sitting on the computer for prolonged periods of time.        Objective: Weakness that is primarily R sided when it comes to glute and single leg stance.   Gait: pt ambulates on level ground with decreased ne, decreased step length R, decreased stance phase R, and decreased arm swing. Tightness of hamstring, quad and hip flexor musculature on the R.      Assessment: Pt responded well to skilled therapy session with reports of a decrease in her soreness and tightness following exercise and manual prescription. Pt presents with soft tissue restrictions throughout her BLE musuculature with her iliopsoas being most restricted. Pt has shown improvement in SLS and glute strength which is seen through an improved gait pattern/mechanics. Pt was reminded importance of stretching and mobility exercises along with her strengthening exercises to maximize overall progress. Pt will be re-evaluated on her last visit to track overall progress.        Goals: (To be met in 20 visits)   Pt will have improved hip AROM Flex to 130 deg and ABD to 45 deg to be able to don/doff shoes and perform car transfers without  difficulty  Pt will improve hip ABD and ER strength to 5/5 to increase ease with standing and walking   Pt will demonstrate improved SLS to >35 seconds CHINA to promote safety and decrease risk of falls on uneven surfaces such as grass  Pt will be able to squat to  light objects around the house with <0/10 hip pain   Pt will improve functional hip strength to report ability to ascend/descend 1 flight of stairs reciprocally without use of handrail  Pt will be independent and compliant with comprehensive HEP to maintain progress achieved in PT    Plan: Progress per POC and continue glute strengthening exercises. Track overall progress and complete questionnaire on last visit 11/6.   Date:10/23/2024   Tx#: 8/10 Date:10/25/2024   Tx#: 9/10 Date:10/30/2024   Tx#: 10/12 Date:11/1/2024   Tx#: 11/12   NuStep x 6 min NuStep x 8 min NuStep x 8 min NuStep x 8 min   Forward lunge on bosu 3x10 per leg in // bars w/ UE assist Shuttle squats 3x12 lvl 6 Bridges using exercise ball 3x12 Forward lunge on bosu 3x10 per leg in // bars    Standing hip abduction and extension on foam 3x10 per leg Monster walks w/ TB at ankle yellow TB 3 laps  Slant board PF/DF and Inv/Vilma x30 each  Monster walks w/ TB at ankle yellow TB 3 laps    Slant board PF/DF and Inv/Vilma x30 each  Wall squats using exercise ball against wall 3x12 ITB, Glute, and vastus lateralis rolling Wall squats using exercise ball against wall 3x12   Bridges on exercise ball 3x10 ITB, Glute, and vastus lateralis rolling Hamstring, gastroc, quad and hip flexor stretches 2x45 each  Standing on foam hip ABD+Ext 3x10 per leg   ITB, Glute, and vastus lateralis rolling Hamstring, gastroc, quad and hip flexor stretches 2x45 each  Manual strumming +sustained pressure of iliopsoas musculature bilaterally Slant board PF/DF and Inv/Vilma x30 each    HEP: Exercises  - Prone Quadriceps Stretch with Strap  - 1 x daily - 5-7 x weekly - 3 sets - 30-45 hold  - Standing Gastroc Stretch   - 1 x daily - 5-7 x weekly - 3 sets - 30-45 hold  - Mini Squat with Counter Support  - 1 x daily - 3-4 x weekly - 3 sets - 10 reps  - Standing March with Counter Support  - 1 x daily - 3-4 x weekly - 3 sets - 10-12 reps  - Sidelying Hip Abduction  - 1 x daily - 3-4 x weekly - 3 sets - 10-12 reps    Charges: TherX(2)   MT(1)    Total Timed Treatment: 45 min  Total Treatment Time: 45 min

## 2024-11-04 DIAGNOSIS — M16.0 PRIMARY OSTEOARTHRITIS OF BOTH HIPS: ICD-10-CM

## 2024-11-05 RX ORDER — TRAMADOL HYDROCHLORIDE 50 MG/1
50 TABLET ORAL EVERY 6 HOURS PRN
Qty: 60 TABLET | Refills: 0 | Status: SHIPPED | OUTPATIENT
Start: 2024-11-05

## 2024-11-05 NOTE — TELEPHONE ENCOUNTER
Tramadol    DOS: 9/12/24 R total hip-  12/10/24 Left total hip  Last OV: 10/23/24  Last refill date: 10/14/24 #/refills: 60/0  Upcoming appt:   Future Appointments   Date Time Provider Department Center   12/24/2024  8:40 AM Lion Huerta PA-C [facility] [address]   1/22/2025 11:40 AM Ruddy Sanchez MD [facility]

## 2024-11-06 ENCOUNTER — OFFICE VISIT (OUTPATIENT)
Dept: PHYSICAL THERAPY | Age: 59
End: 2024-11-06
Attending: ORTHOPAEDIC SURGERY
Payer: COMMERCIAL

## 2024-11-06 PROCEDURE — 97110 THERAPEUTIC EXERCISES: CPT

## 2024-11-06 PROCEDURE — 97140 MANUAL THERAPY 1/> REGIONS: CPT

## 2024-11-06 RX ORDER — ATORVASTATIN CALCIUM 10 MG/1
10 TABLET, FILM COATED ORAL DAILY
Qty: 90 TABLET | Refills: 3 | Status: SHIPPED | OUTPATIENT
Start: 2024-11-06

## 2024-11-06 NOTE — TELEPHONE ENCOUNTER
Please Review. Protocol Failed; No Protocol   Please advise medication is patient external reported or historical.     Requested Prescriptions   Pending Prescriptions Disp Refills    atorvastatin 10 MG Oral Tab  0     Sig: Take 1 tablet (10 mg total) by mouth daily.       Cholesterol Medication Protocol Passed - 11/6/2024  3:49 PM        Passed - ALT < 80     Lab Results   Component Value Date    ALT 12 08/21/2024             Passed - ALT resulted within past year        Passed - Lipid panel within past 12 months     Lab Results   Component Value Date    CHOLEST 212 (H) 12/08/2023    TRIG 120 12/08/2023    HDL 64 (H) 12/08/2023     (H) 12/08/2023    VLDL 21 12/08/2023    TCHDLRATIO 5.00 (H) 04/08/2016    NONHDLC 148 (H) 12/08/2023             Passed - In person appointment or virtual visit in the past 12 mos or appointment in next 3 mos     Recent Outpatient Visits              5 days ago     [facility] James Ashraf, PT    Office Visit    1 week ago     [facility] James Ashraf, PT    Office Visit    1 week ago     [facility] James Ashraf, PT    Office Visit    2 weeks ago     [facility] James Ashraf, PT    Office Visit    2 weeks ago Status post right hip replacement    [address] Ruddy Sanchez MD    Office Visit          Future Appointments         Provider Department Appt Notes    Today James Ashraf, PT [facility] no c/p, no limit, no auth  [employer]    In 1 week Vinod Ortega DO [facility] EOS[facility]: Bilateral L3, L4, L5 medial branch radiofrequency ablation- IVCS    In 2 weeks [facility], [address] [place] [address]     In 2 weeks Carley Resendiz APRN [address] Dr. Sanchez-Surgery Date:   12/10/2024-Left Anterior Total Hip Arthroplasty-Placed forms in red folder    In 1 month Terra Nevarez, PT [facility] in [facility]    In 1 month James Ashraf, PT [facility]    In 1 month James Ashraf, PT [facility]    In 1 month James Ashraf, PT [facility]    In 1 month REF BK RD [facility], [address] [place] [address] Bloodwork for annual physical    In 1 month James Ashraf PT [facility]    In 1 month Lion Huerta PA-C [facility], [address] [place] [address] Left anterior total hip arthroplasty DOS 12/10/2024    In 1 month Carley Resendiz APRN [address]     In 1 month James Ashraf, PT [facility]    In 1 month James Ashraf PT [facility]    In 2 months Ruddy Sanchez MD [facility], [address] [place] [address] 6WK Left anterior total hip arthroplasty DOS 12/10/2024                           Future Appointments         Provider Department Appt Notes    Today James Ashraf, PT [facility] no c/p, no limit, no auth  [facility]    In 1 week Vinod Ortega DO [facility]: Bilateral L3, L4, L5 medial branch radiofrequency ablation- IVCS    In 2 weeks KELSEY  [facility], [address] [place] [address]     In 2 weeks Carley Resendiz APRN [address] Dr. Sanchez-Surgery Date:  12/10/2024-Left Anterior Total Hip Arthroplasty-Placed forms in red folder    In 1 month Terra Nevarez, PT [facility]    In 1 month James Ashraf, PT [facility]    In 1 month James Ashraf, PT [facility]  [facility]    In 1 month James Ashraf, PT [facility]    In 1 month REF BK RD [facility], [facility] Bloodwork for annual physical    In 1 month James Ashraf, PT [facility]    In 1 month Lion Huerta PA-C [facility], [address] [place] [address] Left anterior total hip arthroplasty DOS 12/10/2024    In 1 month Carley Resendiz APRN [facility], [address] [place] [address]     In 1 month James Ashraf, PT [facility]    In 1 month James Ashraf, PT [facility]    In 2 months Ruddy Sanchez MD [facility], [address] [place] [address] 6WK Left anterior total hip arthroplasty DOS 12/10/2024          Recent Outpatient Visits              5 days ago     [facility] James Ashraf PT    Office Visit    1 week ago     [facility] James Ashraf, PT    Office Visit    1 week ago     [facility] James Ashraf, PT    Office Visit    2 weeks ago     [facility] James Ashraf, PT    Office Visit    2 weeks ago Status post right hip replacement    [facility], [address] [place] [address] Ruddy Sanchez MD    Office Visit

## 2024-11-07 NOTE — PROGRESS NOTES
Diagnosis:   Status post right hip replacement (Z96.641)       Referring Provider: Ruddy Sanchez  Date of Evaluation:     9/27/2024     Precautions:   anterior hip precautions  Next MD visit:   none scheduled  Date of Surgery: n/a   Insurance Primary/Secondary: [facility] / N/A     # Auth Visits: n/a           Discharge Summary  Pt has attended 12 visits in Physical Therapy and has reached all goals. Pt has displayed improvement in BLE strength, hip ROM and functional mobility. Pt is scheduled to get her L hip replaced in December. Pt was given an updated HEP for continued maintenance and progression while away from therapy. HEP was reviewed and understood, all questions answered.         Subjective: Pt arrives to the clinic with reports of R sided hip \"tightness\" that is located anteriorly, no pain at this time. No other concerns at this time.    Pain: 0/10 more of an anterior hip(iliopsoas and quad) tightness        Objective:   Gait: pt ambulates on level ground with normal gait mechanics.      Assessment: Pt responded well to skilled therapy session with reports of a decrease in her soreness and tightness following exercise and manual prescription. Pt has shown improvement in SLS and glute strength which is seen through an improved gait pattern/mechanics. Pt was reminded importance of stretching and mobility exercises along with her strengthening exercises to maximize overall progress.       Goals: (To be met in 20 visits)   Pt will have improved hip AROM Flex to 130 deg and ABD to 45 deg to be able to don/doff shoes and perform car transfers without difficulty. MET  Pt will improve hip ABD and ER strength to 5/5 to increase ease with standing and walking. MET   Pt will demonstrate improved SLS to >35 seconds CHINA to promote safety and decrease risk of falls on uneven surfaces such as grass. MET  Pt will be able to squat to  light objects around the house with <0/10 hip pain. MET  Pt will  improve functional hip strength to report ability to ascend/descend 1 flight of stairs reciprocally without use of handrail. MET  Pt will be independent and compliant with comprehensive HEP to maintain progress achieved in PT. MET    Plan: Progress per POC and continue glute strengthening exercises. Track overall progress and complete questionnaire on last visit 11/6.   Date:10/23/2024   Tx#: 8/10 Date:10/25/2024   Tx#: 9/10 Date:10/30/2024   Tx#: 10/12 Date:11/1/2024   Tx#: 11/12 Date:11/6/2024   Tx#: 12/12   NuStep x 6 min NuStep x 8 min NuStep x 8 min NuStep x 8 min NuStep x 8 min lvl 5   Forward lunge on bosu 3x10 per leg in // bars w/ UE assist Shuttle squats 3x12 lvl 6 Bridges using exercise ball 3x12 Forward lunge on bosu 3x10 per leg in // bars  Bridges using exercise ball 3x12   Standing hip abduction and extension on foam 3x10 per leg Monster walks w/ TB at ankle yellow TB 3 laps  Slant board PF/DF and Inv/Vilma x30 each  Monster walks w/ TB at ankle yellow TB 3 laps  Monster walks +lateral band walks w/ TB at ankle yellow TB 3 laps each   Slant board PF/DF and Inv/Vilma x30 each  Wall squats using exercise ball against wall 3x12 ITB, Glute, and vastus lateralis rolling Wall squats using exercise ball against wall 3x12 ITB, Glute, and vastus lateralis rolling   Bridges on exercise ball 3x10 ITB, Glute, and vastus lateralis rolling Hamstring, gastroc, quad and hip flexor stretches 2x45 each  Standing on foam hip ABD+Ext 3x10 per leg Hamstring, gastroc, quad and hip flexor stretches 2x45 each    ITB, Glute, and vastus lateralis rolling Hamstring, gastroc, quad and hip flexor stretches 2x45 each  Manual strumming +sustained pressure of iliopsoas musculature bilaterally Slant board PF/DF and Inv/Vilma x30 each  Manual strumming +sustained pressure of iliopsoas musculature bilaterally   HEP: Access Code: [email or web address]  URL: [email or web address]  Date: 11/06/2024  Prepared by: James  Pascale    Exercises  - Prone Quadriceps Stretch with Strap  - 1 x daily - 5-7 x weekly - 3 sets - 30-45 hold  - Standing Gastroc Stretch  - 1 x daily - 5-7 x weekly - 3 sets - 30-45 hold  - Sidelying Hip Abduction  - 1 x daily - 3-4 x weekly - 3 sets - 10-12 reps  - Wall Squat with Swiss Ball  - 1 x daily - 3-5 x weekly - 3 sets - 10-12 reps  - Standing Isometric Hip Abduction with Mini Squat and Ball on Wall  - 1 x daily - 3 x weekly - 2-3 sets - 10 reps - 15-30 seconds hold  - Forward Monster Walks  - 1 x daily - 3-4 x weekly - 3 sets - 10 reps  - Side Stepping with Resistance at Feet  - 1 x daily - 3-4 x weekly - 3 sets - 10 reps  - Bird Dog  - 1 x daily - 3 x weekly - 3 sets - 10 reps         Plan: Discharge skilled Physical Therapy. Treatment will include: HEP for continued maintenance and progression.       Patient/Family/Caregiver was advised of these findings, precautions, and treatment options and has agreed to actively participate in planning and for this course of care.    Thank you for your referral. If you have any questions, please contact me at Dept: [phone].    Sincerely,  Electronically signed by therapist: James Ashraf PT     Physician's certification required:  No  Please co-sign or sign and return this letter via fax as soon as possible to [phone].   I certify the need for these services furnished under this plan of treatment and while under my care.    X___________________________________________________ Date____________________    Certification From: 11/8/2024  To:2/6/2025      Charges: TherX(2)   MT(1)    Total Timed Treatment: 45 min  Total Treatment Time: 45 min

## 2024-11-12 ENCOUNTER — PATIENT MESSAGE (OUTPATIENT)
Dept: ORTHOPEDICS CLINIC | Facility: CLINIC | Age: 59
End: 2024-11-12

## 2024-11-13 ENCOUNTER — TELEPHONE (OUTPATIENT)
Dept: ORTHOPEDICS CLINIC | Facility: CLINIC | Age: 59
End: 2024-11-13

## 2024-11-14 DIAGNOSIS — E78.5 DYSLIPIDEMIA: Primary | ICD-10-CM

## 2024-11-14 DIAGNOSIS — Z00.00 ROUTINE GENERAL MEDICAL EXAMINATION AT A HEALTH CARE FACILITY: ICD-10-CM

## 2024-11-14 DIAGNOSIS — Z13.29 SCREENING FOR THYROID DISORDER: ICD-10-CM

## 2024-11-15 ENCOUNTER — PATIENT MESSAGE (OUTPATIENT)
Dept: PHYSICAL MEDICINE AND REHAB | Facility: CLINIC | Age: 59
End: 2024-11-15

## 2024-11-22 ENCOUNTER — LAB ENCOUNTER (OUTPATIENT)
Dept: LAB | Age: 59
End: 2024-11-22
Attending: ORTHOPAEDIC SURGERY
Payer: COMMERCIAL

## 2024-11-22 DIAGNOSIS — M16.12 PRIMARY OSTEOARTHRITIS OF LEFT HIP: ICD-10-CM

## 2024-11-22 DIAGNOSIS — Z13.29 SCREENING FOR THYROID DISORDER: ICD-10-CM

## 2024-11-22 DIAGNOSIS — Z00.00 ROUTINE GENERAL MEDICAL EXAMINATION AT A HEALTH CARE FACILITY: ICD-10-CM

## 2024-11-22 DIAGNOSIS — E78.5 DYSLIPIDEMIA: ICD-10-CM

## 2024-11-22 PROBLEM — Z96.641 HISTORY OF TOTAL HIP REPLACEMENT, RIGHT: Status: ACTIVE | Noted: 2024-08-26

## 2024-11-22 LAB
ALBUMIN SERPL-MCNC: 4.5 G/DL (ref 3.2–4.8)
ALBUMIN/GLOB SERPL: 1.7 {RATIO} (ref 1–2)
ALP LIVER SERPL-CCNC: 77 U/L
ALT SERPL-CCNC: 17 U/L
ANION GAP SERPL CALC-SCNC: 5 MMOL/L (ref 0–18)
ANTIBODY SCREEN: NEGATIVE
APTT PPP: 25.3 SECONDS (ref 23–36)
AST SERPL-CCNC: 20 U/L (ref ?–34)
BASOPHILS # BLD AUTO: 0.09 X10(3) UL (ref 0–0.2)
BASOPHILS NFR BLD AUTO: 1.2 %
BILIRUB SERPL-MCNC: 0.4 MG/DL (ref 0.3–1.2)
BUN BLD-MCNC: 19 MG/DL (ref 9–23)
CALCIUM BLD-MCNC: 10.5 MG/DL (ref 8.7–10.4)
CHLORIDE SERPL-SCNC: 107 MMOL/L (ref 98–112)
CHOLEST SERPL-MCNC: 197 MG/DL (ref ?–200)
CO2 SERPL-SCNC: 30 MMOL/L (ref 21–32)
CREAT BLD-MCNC: 0.86 MG/DL
EGFRCR SERPLBLD CKD-EPI 2021: 78 ML/MIN/1.73M2 (ref 60–?)
EOSINOPHIL # BLD AUTO: 0.13 X10(3) UL (ref 0–0.7)
EOSINOPHIL NFR BLD AUTO: 1.8 %
ERYTHROCYTE [DISTWIDTH] IN BLOOD BY AUTOMATED COUNT: 13.4 %
FASTING PATIENT LIPID ANSWER: YES
GLOBULIN PLAS-MCNC: 2.6 G/DL (ref 2–3.5)
GLUCOSE BLD-MCNC: 98 MG/DL (ref 70–99)
HCT VFR BLD AUTO: 46.6 %
HDLC SERPL-MCNC: 68 MG/DL (ref 40–59)
HGB BLD-MCNC: 15 G/DL
IMM GRANULOCYTES # BLD AUTO: 0.03 X10(3) UL (ref 0–1)
IMM GRANULOCYTES NFR BLD: 0.4 %
INR BLD: 1.04 (ref 0.8–1.2)
LDLC SERPL CALC-MCNC: 111 MG/DL (ref ?–100)
LYMPHOCYTES # BLD AUTO: 1.45 X10(3) UL (ref 1–4)
LYMPHOCYTES NFR BLD AUTO: 19.9 %
MCH RBC QN AUTO: 28 PG (ref 26–34)
MCHC RBC AUTO-ENTMCNC: 32.2 G/DL (ref 31–37)
MCV RBC AUTO: 87.1 FL
MONOCYTES # BLD AUTO: 0.55 X10(3) UL (ref 0.1–1)
MONOCYTES NFR BLD AUTO: 7.5 %
NEUTROPHILS # BLD AUTO: 5.05 X10 (3) UL (ref 1.5–7.7)
NEUTROPHILS # BLD AUTO: 5.05 X10(3) UL (ref 1.5–7.7)
NEUTROPHILS NFR BLD AUTO: 69.2 %
NONHDLC SERPL-MCNC: 129 MG/DL (ref ?–130)
OSMOLALITY SERPL CALC.SUM OF ELEC: 296 MOSM/KG (ref 275–295)
PLATELET # BLD AUTO: 241 10(3)UL (ref 150–450)
POTASSIUM SERPL-SCNC: 4.3 MMOL/L (ref 3.5–5.1)
PROT SERPL-MCNC: 7.1 G/DL (ref 5.7–8.2)
PROTHROMBIN TIME: 13.7 SECONDS (ref 11.6–14.8)
RBC # BLD AUTO: 5.35 X10(6)UL
RH BLOOD TYPE: NEGATIVE
SODIUM SERPL-SCNC: 142 MMOL/L (ref 136–145)
TRIGL SERPL-MCNC: 103 MG/DL (ref 30–149)
TSI SER-ACNC: 0.85 UIU/ML (ref 0.55–4.78)
VLDLC SERPL CALC-MCNC: 18 MG/DL (ref 0–30)
WBC # BLD AUTO: 7.3 X10(3) UL (ref 4–11)

## 2024-11-22 PROCEDURE — 87081 CULTURE SCREEN ONLY: CPT

## 2024-11-22 PROCEDURE — 80061 LIPID PANEL: CPT

## 2024-11-22 PROCEDURE — 80053 COMPREHEN METABOLIC PANEL: CPT

## 2024-11-22 PROCEDURE — 87147 CULTURE TYPE IMMUNOLOGIC: CPT

## 2024-11-22 PROCEDURE — 86850 RBC ANTIBODY SCREEN: CPT

## 2024-11-22 PROCEDURE — 86900 BLOOD TYPING SEROLOGIC ABO: CPT

## 2024-11-22 PROCEDURE — 85610 PROTHROMBIN TIME: CPT

## 2024-11-22 PROCEDURE — 85025 COMPLETE CBC W/AUTO DIFF WBC: CPT

## 2024-11-22 PROCEDURE — 84443 ASSAY THYROID STIM HORMONE: CPT

## 2024-11-22 PROCEDURE — 36415 COLL VENOUS BLD VENIPUNCTURE: CPT

## 2024-11-22 PROCEDURE — 86901 BLOOD TYPING SEROLOGIC RH(D): CPT

## 2024-11-22 PROCEDURE — 85730 THROMBOPLASTIN TIME PARTIAL: CPT

## 2024-11-22 NOTE — PROGRESS NOTES
[facility]  PRE OP RISK ASSESSMENT    REASON FOR CONSULT: Pre-op risk assessment for surgical procedure left anterior total hip arthroplasty planned for 12/10/24 with Dr Sanchez .    REQUESTING PHYSICIAN: Dr Sanchez    CHIEF COMPLAINT:   Chief Complaint   Patient presents with    Pre-Op Exam       HISTORY OF PRESENT ILLNESS:   The patient is a 59 year old  female  presents for pre procedure evaluation  Ms Ivna has been following with Dr Sanchez for progressive left hip pain. She has failed conservative treatment including injections and physical therapy. The pain is now impacting her activity of daily living including exercise. She has opted to proceed with above. She underwent Right THR in August of this year and did well.      EXERCISE TOLERANCE: Ms Ivan is active. Her exercise/activity is limited by hip osteoarthritis. She is able to walk several blocks without chest pain or SOB. The patient is able to achieve 4 MET and more of CV exercise with exertional symptoms.     Dyslipidemia    stable  atorvastatin 10mg.      PREVIOUS SURGERY:   Patient has had previous surgery without incident.     Past Medical History:    Past Medical History:    ADHD (attention deficit hyperactivity disorder)    Arthritis    Back problem    Genital warts    Hearing loss    High cholesterol    Visual impairment    contact lenses         Past Surgical History:    Past Surgical History:   Procedure Laterality Date    Laser surgery of cervix      genital warts removal    Thompson localization wire 1 site right (cpt=[id number])      CYST     Other surgical history  age 7    urethra surgery    Total hip replacement Right 09/2024    Tubal ligation  2005       Current Medications:    Current Outpatient Medications   Medication Sig Dispense Refill    senna-docusate 8.6-50 MG Oral Tab Take 1 tablet by mouth 2 (two) times daily as needed. 30 tablet 0    traMADol 50 MG Oral Tab Take 1 tablet (50 mg total) by mouth every 6 (six)  hours as needed for Pain. 60 tablet 0    atorvastatin 10 MG Oral Tab Take 1 tablet (10 mg total) by mouth daily. 90 tablet 3    celecoxib 200 MG Oral Cap Take 1 capsule (200 mg total) by mouth 2 (two) times daily. 180 capsule 3        Allergies:    Allergies as of 11/26/2024 - Review Complete 11/26/2024   Allergen Reaction Noted    Cephalexin HIVES 04/04/2022    Penicillins UNKNOWN and RASH 09/14/2012       SOCIAL HISTORY:   Social History     Socioeconomic History    Marital status: Single   Tobacco Use    Smoking status: Former     Current packs/day: 1.00     Types: Cigarettes    Smokeless tobacco: Never    Tobacco comments:     Updated 5/3/24   Vaping Use    Vaping status: Never Used   Substance and Sexual Activity    Alcohol use: Yes     Alcohol/week: 1.0 standard drink of alcohol     Types: 1 Glasses of wine per week     Comment: occasionally    Drug use: No    Sexual activity: Yes     Partners: Male     Birth control/protection: Tubal Ligation     Social Drivers of Health     Food Insecurity: No Food Insecurity (9/12/2024)    Food Insecurity     Food Insecurity: Never true   Transportation Needs: No Transportation Needs (9/12/2024)    Transportation Needs     Lack of Transportation: No   Housing Stability: Low Risk  (9/12/2024)    Housing Stability     Housing Instability: No            FAMILY HISTORY:   Family History   Problem Relation Age of Onset    Heart Disorder Father         stent placed    Lipids Mother     Colon Polyps Mother     Cancer Maternal Grandmother         lymphoma    Heart Attack Maternal Grandfather     Heart Attack Paternal Grandfather         REVIEW OF SYSTEMS:    GENERAL: feels well otherwise  SKIN: denies any unusual skin lesions  HEENT: denies blurred vision or double vision denies nasal congestion  LUNGS: denies shortness of breath with exertion  CARDIOVASCULAR: denies chest pain on exertion  GI: denies abdominal pain, denies heartburn  : denies dysuria, urgency, frequency,  hematuria  NEURO: denies headaches    PHYSICAL EXAM:  /70 (BP Location: Left arm, Patient Position: Sitting, Cuff Size: adult)   Pulse 58   Temp 97 °F (36.1 °C) (Temporal)   Wt 142 lb (64.4 kg)   LMP 08/08/2024   SpO2 100%   BMI 24.37 kg/m²    GENERAL: well developed, well nourished,in no apparent distress  SKIN: no rashes,no suspicious lesions  HEENT: atraumatic, normocephalic,ears and throat are clear  NECK: supple,no adenopathy  CHEST: no chest tenderness  LUNGS: clear to auscultation  CARDIO: RRR without murmur  GI: good BS's,no masses, HSM or tenderness  MUSCULOSKELETAL: back is not tender,  EXTREMITIES: no edema  NEURO: Oriented times three,    LABS:  Completed and reviewed.    EKG: normal rate normal rhythm no acute changes.        ASSESSMENT AND PLAN:    Encounter Diagnoses   Name Primary?    Primary osteoarthritis of left hip Yes    Lumbar facet arthropathy     Dyslipidemia atorvastatin.      Hx right THR.      No orders of the defined types were placed in this encounter.      Ms Ivan has had surgery before without incident.  There are no contraindications proceeding with surgery.  Please feel free to call with questions or concerns.      Meds & Refills for this Visit:  Requested Prescriptions      No prescriptions requested or ordered in this encounter       Imaging & Consults:  ELECTROCARDIOGRAM, COMPLETE    No follow-ups on file.  There are no Patient Instructions on file for this visit.

## 2024-11-22 NOTE — H&P (VIEW-ONLY)
[facility]  PRE OP RISK ASSESSMENT    REASON FOR CONSULT: Pre-op risk assessment for surgical procedure left anterior total hip arthroplasty planned for 12/10/24 with Dr Sanchez .    REQUESTING PHYSICIAN: Dr Sanchez    CHIEF COMPLAINT:   Chief Complaint   Patient presents with    Pre-Op Exam       HISTORY OF PRESENT ILLNESS:   The patient is a 59 year old  female  presents for pre procedure evaluation  Ms Ivan has been following with Dr Sanchez for progressive left hip pain. She has failed conservative treatment including injections and physical therapy. The pain is now impacting her activity of daily living including exercise. She has opted to proceed with above. She underwent Right THR in August of this year and did well.      EXERCISE TOLERANCE: Ms Ivan is active. Her exercise/activity is limited by hip osteoarthritis. She is able to walk several blocks without chest pain or SOB. The patient is able to achieve 4 MET and more of CV exercise with exertional symptoms.     Dyslipidemia    stable  atorvastatin 10mg.      PREVIOUS SURGERY:   Patient has had previous surgery without incident.     Past Medical History:    Past Medical History:    ADHD (attention deficit hyperactivity disorder)    Arthritis    Back problem    Genital warts    Hearing loss    High cholesterol    Visual impairment    contact lenses         Past Surgical History:    Past Surgical History:   Procedure Laterality Date    Laser surgery of cervix      genital warts removal    Thompson localization wire 1 site right (cpt=[id number])      CYST     Other surgical history  age 7    urethra surgery    Total hip replacement Right 09/2024    Tubal ligation  2005       Current Medications:    Current Outpatient Medications   Medication Sig Dispense Refill    senna-docusate 8.6-50 MG Oral Tab Take 1 tablet by mouth 2 (two) times daily as needed. 30 tablet 0    traMADol 50 MG Oral Tab Take 1 tablet (50 mg total) by mouth every 6 (six)  hours as needed for Pain. 60 tablet 0    atorvastatin 10 MG Oral Tab Take 1 tablet (10 mg total) by mouth daily. 90 tablet 3    celecoxib 200 MG Oral Cap Take 1 capsule (200 mg total) by mouth 2 (two) times daily. 180 capsule 3        Allergies:    Allergies as of 11/26/2024 - Review Complete 11/26/2024   Allergen Reaction Noted    Cephalexin HIVES 04/04/2022    Penicillins UNKNOWN and RASH 09/14/2012       SOCIAL HISTORY:   Social History     Socioeconomic History    Marital status: Single   Tobacco Use    Smoking status: Former     Current packs/day: 1.00     Types: Cigarettes    Smokeless tobacco: Never    Tobacco comments:     Updated 5/3/24   Vaping Use    Vaping status: Never Used   Substance and Sexual Activity    Alcohol use: Yes     Alcohol/week: 1.0 standard drink of alcohol     Types: 1 Glasses of wine per week     Comment: occasionally    Drug use: No    Sexual activity: Yes     Partners: Male     Birth control/protection: Tubal Ligation     Social Drivers of Health     Food Insecurity: No Food Insecurity (9/12/2024)    Food Insecurity     Food Insecurity: Never true   Transportation Needs: No Transportation Needs (9/12/2024)    Transportation Needs     Lack of Transportation: No   Housing Stability: Low Risk  (9/12/2024)    Housing Stability     Housing Instability: No            FAMILY HISTORY:   Family History   Problem Relation Age of Onset    Heart Disorder Father         stent placed    Lipids Mother     Colon Polyps Mother     Cancer Maternal Grandmother         lymphoma    Heart Attack Maternal Grandfather     Heart Attack Paternal Grandfather         REVIEW OF SYSTEMS:    GENERAL: feels well otherwise  SKIN: denies any unusual skin lesions  HEENT: denies blurred vision or double vision denies nasal congestion  LUNGS: denies shortness of breath with exertion  CARDIOVASCULAR: denies chest pain on exertion  GI: denies abdominal pain, denies heartburn  : denies dysuria, urgency, frequency,  hematuria  NEURO: denies headaches    PHYSICAL EXAM:  /70 (BP Location: Left arm, Patient Position: Sitting, Cuff Size: adult)   Pulse 58   Temp 97 °F (36.1 °C) (Temporal)   Wt 142 lb (64.4 kg)   LMP 08/08/2024   SpO2 100%   BMI 24.37 kg/m²    GENERAL: well developed, well nourished,in no apparent distress  SKIN: no rashes,no suspicious lesions  HEENT: atraumatic, normocephalic,ears and throat are clear  NECK: supple,no adenopathy  CHEST: no chest tenderness  LUNGS: clear to auscultation  CARDIO: RRR without murmur  GI: good BS's,no masses, HSM or tenderness  MUSCULOSKELETAL: back is not tender,  EXTREMITIES: no edema  NEURO: Oriented times three,    LABS:  Completed and reviewed.    EKG: normal rate normal rhythm no acute changes.        ASSESSMENT AND PLAN:    Encounter Diagnoses   Name Primary?    Primary osteoarthritis of left hip Yes    Lumbar facet arthropathy     Dyslipidemia atorvastatin.      Hx right THR.      No orders of the defined types were placed in this encounter.      Ms Ivan has had surgery before without incident.  There are no contraindications proceeding with surgery.  Please feel free to call with questions or concerns.      Meds & Refills for this Visit:  Requested Prescriptions      No prescriptions requested or ordered in this encounter       Imaging & Consults:  ELECTROCARDIOGRAM, COMPLETE    No follow-ups on file.  There are no Patient Instructions on file for this visit.

## 2024-11-24 RX ORDER — MUPIROCIN 20 MG/G
1 OINTMENT TOPICAL 2 TIMES DAILY
Qty: 15 G | Refills: 0 | Status: SHIPPED | OUTPATIENT
Start: 2024-11-24 | End: 2024-11-29

## 2024-11-25 ENCOUNTER — TELEPHONE (OUTPATIENT)
Dept: ORTHOPEDICS CLINIC | Facility: CLINIC | Age: 59
End: 2024-11-25

## 2024-11-25 DIAGNOSIS — M16.0 PRIMARY OSTEOARTHRITIS OF BOTH HIPS: ICD-10-CM

## 2024-11-25 RX ORDER — TRAMADOL HYDROCHLORIDE 50 MG/1
50 TABLET ORAL EVERY 6 HOURS PRN
Qty: 60 TABLET | Refills: 0 | Status: SHIPPED | OUTPATIENT
Start: 2024-11-25

## 2024-11-25 RX ORDER — SENNA AND DOCUSATE SODIUM 50; 8.6 MG/1; MG/1
1 TABLET, FILM COATED ORAL 2 TIMES DAILY PRN
Qty: 30 TABLET | Refills: 0 | Status: SHIPPED | OUTPATIENT
Start: 2024-11-25

## 2024-11-25 NOTE — TELEPHONE ENCOUNTER
Tramadol  DOS: 09/12/24, scheduled 12/10/24  Last OV: 10/23/24  Last refill date: 11/5/24     #/refills: 60/0  Upcoming appt:   Future Appointments   Date Time Provider Department Center   11/26/2024  7:00 AM Carley Resendiz APRN [facility] [address] [facility] [facility]   12/13/2024 12:45 PM Terra Nevarez, PT SNPT [facility]   12/16/2024  2:45 PM James Ashraf, PT SNPT [facility]   12/18/2024  6:30 PM James Ashraf, PT SNPT [facility]   12/20/2024 12:30 PM James Ashraf, PT SNPT [facility]   12/21/2024  8:30 AM REF BK RD REF [facility] Ref [facility] 14   12/23/2024  5:45 PM James Ashraf, PT SNPT [facility]   12/24/2024  8:40 AM Lion Huerta PA-C [facility] [address]

## 2024-11-25 NOTE — TELEPHONE ENCOUNTER
Senna - docusate  DOS: 09/12/24, Scheduled 12/10/24  Last OV: 1023/24  Last refill date: 09/13/24     #/refills: 30/0  Upcoming appt:   Future Appointments   Date Time Provider Department Center   11/26/2024  7:00 AM Carley Resendiz APRN [facility] [address] [facility]   12/13/2024 12:45 PM Terra Nevarez, PT SNPT [facility]   12/16/2024  2:45 PM James Ashraf, PT SNPT [facility]   12/18/2024  6:30 PM James Ashraf, PT SNPT [facility]   12/20/2024 12:30 PM James Ashraf, PT SNPT [facility]   12/21/2024  8:30 AM REF BK RD REF [facility] Ref [facility] 14   12/23/2024  5:45 PM James Ashraf, PT SNPT [facility]   12/24/2024  8:40 AM Lion Huerta PA-C [facility] [address]   12/27/2024 12:30 PM James Ashraf, PT SNPT [facility]   12/30/2024  7:30 AM Carley Resendiz APRN [facility] [address] [facility]   12/30/2024  5:45 PM James Ashraf, PT SNPT [facility]   1/22/2025 11:40 AM Ruddy Sanchez MD [facility] [employer]

## 2024-11-25 NOTE — TELEPHONE ENCOUNTER
----- Message from Ruddy Sanchez sent at 11/24/2024  8:04 PM CST -----  Results reviewed. Please inform patient MSSA was positive and advise on decolonization protocol. Mupirocin has been sent to her pharmacy. Thank you.

## 2024-11-25 NOTE — TELEPHONE ENCOUNTER
The patient was called to further answer questions related to MSSA.  It was clarified that she needs to use the medication 5 days immediately prior to surgery.  Hand washing prior to dressing changes was also discussed.  All questions answered at this time.

## 2024-11-26 ENCOUNTER — OFFICE VISIT (OUTPATIENT)
Dept: INTERNAL MEDICINE CLINIC | Facility: CLINIC | Age: 59
End: 2024-11-26
Payer: COMMERCIAL

## 2024-11-26 ENCOUNTER — MED REC SCAN ONLY (OUTPATIENT)
Dept: INTERNAL MEDICINE CLINIC | Facility: CLINIC | Age: 59
End: 2024-11-26

## 2024-11-26 ENCOUNTER — TELEPHONE (OUTPATIENT)
Dept: INTERNAL MEDICINE CLINIC | Facility: CLINIC | Age: 59
End: 2024-11-26

## 2024-11-26 VITALS
HEART RATE: 58 BPM | WEIGHT: 142 LBS | DIASTOLIC BLOOD PRESSURE: 70 MMHG | BODY MASS INDEX: 24 KG/M2 | SYSTOLIC BLOOD PRESSURE: 110 MMHG | OXYGEN SATURATION: 100 % | TEMPERATURE: 97 F

## 2024-11-26 DIAGNOSIS — Z96.641 HISTORY OF TOTAL HIP REPLACEMENT, RIGHT: ICD-10-CM

## 2024-11-26 DIAGNOSIS — E78.5 DYSLIPIDEMIA: ICD-10-CM

## 2024-11-26 DIAGNOSIS — M47.816 LUMBAR FACET ARTHROPATHY: ICD-10-CM

## 2024-11-26 DIAGNOSIS — M16.12 PRIMARY OSTEOARTHRITIS OF LEFT HIP: Primary | ICD-10-CM

## 2024-11-26 LAB
ATRIAL RATE: 68 BPM
P AXIS: 41 DEGREES
P-R INTERVAL: 136 MS
Q-T INTERVAL: 384 MS
QRS DURATION: 74 MS
QTC CALCULATION (BEZET): 408 MS
R AXIS: 38 DEGREES
T AXIS: 46 DEGREES
VENTRICULAR RATE: 68 BPM

## 2024-11-26 PROCEDURE — 93000 ELECTROCARDIOGRAM COMPLETE: CPT | Performed by: NURSE PRACTITIONER

## 2024-11-26 PROCEDURE — 99214 OFFICE O/P EST MOD 30 MIN: CPT | Performed by: NURSE PRACTITIONER

## 2024-11-26 NOTE — TELEPHONE ENCOUNTER
Faxed over to [facility]   preop documents.

## 2024-11-26 NOTE — TELEPHONE ENCOUNTER
Patient is cleared by PCP for surgery. Office visit 11/26/2024 in [facility]

## 2024-11-29 ENCOUNTER — MED REC SCAN ONLY (OUTPATIENT)
Dept: ORTHOPEDICS CLINIC | Facility: CLINIC | Age: 59
End: 2024-11-29

## 2024-12-10 ENCOUNTER — HOSPITAL ENCOUNTER (OUTPATIENT)
Facility: HOSPITAL | Age: 59
Discharge: HOME | End: 2024-12-11
Attending: ORTHOPAEDIC SURGERY | Admitting: ORTHOPAEDIC SURGERY
Payer: COMMERCIAL

## 2024-12-10 ENCOUNTER — ANESTHESIA EVENT (OUTPATIENT)
Dept: SURGERY | Facility: HOSPITAL | Age: 59
End: 2024-12-10
Payer: COMMERCIAL

## 2024-12-10 ENCOUNTER — APPOINTMENT (OUTPATIENT)
Dept: GENERAL RADIOLOGY | Facility: HOSPITAL | Age: 59
End: 2024-12-10
Attending: ORTHOPAEDIC SURGERY
Payer: COMMERCIAL

## 2024-12-10 ENCOUNTER — ANESTHESIA (OUTPATIENT)
Dept: SURGERY | Facility: HOSPITAL | Age: 59
End: 2024-12-10
Payer: COMMERCIAL

## 2024-12-10 DIAGNOSIS — M16.0 PRIMARY OSTEOARTHRITIS OF BOTH HIPS: ICD-10-CM

## 2024-12-10 DIAGNOSIS — M16.12 PRIMARY OSTEOARTHRITIS OF LEFT HIP: Primary | ICD-10-CM

## 2024-12-10 PROCEDURE — 27130 TOTAL HIP ARTHROPLASTY: CPT | Performed by: ORTHOPAEDIC SURGERY

## 2024-12-10 PROCEDURE — 76000 FLUOROSCOPY <1 HR PHYS/QHP: CPT | Performed by: ORTHOPAEDIC SURGERY

## 2024-12-10 PROCEDURE — 0SRB0JZ REPLACEMENT OF LEFT HIP JOINT WITH SYNTHETIC SUBSTITUTE, OPEN APPROACH: ICD-10-PCS | Performed by: ORTHOPAEDIC SURGERY

## 2024-12-10 PROCEDURE — 72170 X-RAY EXAM OF PELVIS: CPT | Performed by: ORTHOPAEDIC SURGERY

## 2024-12-10 DEVICE — IMPLANTABLE DEVICE
Type: IMPLANTABLE DEVICE | Site: HIP | Status: FUNCTIONAL
Brand: G7® ACETABULAR SYSTEM

## 2024-12-10 DEVICE — IMPLANTABLE DEVICE
Type: IMPLANTABLE DEVICE | Site: HIP | Status: FUNCTIONAL
Brand: BIOLOX® OPTION

## 2024-12-10 DEVICE — IMPLANTABLE DEVICE: Type: IMPLANTABLE DEVICE | Site: HIP | Status: FUNCTIONAL

## 2024-12-10 DEVICE — IMPLANTABLE DEVICE
Type: IMPLANTABLE DEVICE | Site: HIP | Status: FUNCTIONAL
Brand: BIOLOX® DELTA OPTION

## 2024-12-10 DEVICE — IMPLANTABLE DEVICE
Type: IMPLANTABLE DEVICE | Site: HIP | Status: FUNCTIONAL
Brand: G7® VIVACIT-E®

## 2024-12-10 RX ORDER — DIPHENHYDRAMINE HYDROCHLORIDE 50 MG/ML
12.5 INJECTION INTRAMUSCULAR; INTRAVENOUS EVERY 4 HOURS PRN
Status: DISCONTINUED | OUTPATIENT
Start: 2024-12-10 | End: 2024-12-11

## 2024-12-10 RX ORDER — NALOXONE HYDROCHLORIDE 0.4 MG/ML
0.08 INJECTION, SOLUTION INTRAMUSCULAR; INTRAVENOUS; SUBCUTANEOUS AS NEEDED
Status: DISCONTINUED | OUTPATIENT
Start: 2024-12-10 | End: 2024-12-10 | Stop reason: HOSPADM

## 2024-12-10 RX ORDER — HYDROMORPHONE HYDROCHLORIDE 1 MG/ML
0.4 INJECTION, SOLUTION INTRAMUSCULAR; INTRAVENOUS; SUBCUTANEOUS EVERY 2 HOUR PRN
Status: DISCONTINUED | OUTPATIENT
Start: 2024-12-10 | End: 2024-12-11

## 2024-12-10 RX ORDER — BISACODYL 10 MG
10 SUPPOSITORY, RECTAL RECTAL
Status: DISCONTINUED | OUTPATIENT
Start: 2024-12-10 | End: 2024-12-11

## 2024-12-10 RX ORDER — BUPIVACAINE HYDROCHLORIDE 7.5 MG/ML
INJECTION, SOLUTION INTRASPINAL AS NEEDED
Status: DISCONTINUED | OUTPATIENT
Start: 2024-12-10 | End: 2024-12-10 | Stop reason: SURG

## 2024-12-10 RX ORDER — TIZANIDINE 2 MG/1
2 TABLET ORAL EVERY 6 HOURS PRN
Status: DISCONTINUED | OUTPATIENT
Start: 2024-12-10 | End: 2024-12-11

## 2024-12-10 RX ORDER — MIDAZOLAM HYDROCHLORIDE 1 MG/ML
INJECTION INTRAMUSCULAR; INTRAVENOUS AS NEEDED
Status: DISCONTINUED | OUTPATIENT
Start: 2024-12-10 | End: 2024-12-10 | Stop reason: SURG

## 2024-12-10 RX ORDER — SODIUM PHOSPHATE, DIBASIC AND SODIUM PHOSPHATE, MONOBASIC 7; 19 G/230ML; G/230ML
1 ENEMA RECTAL ONCE AS NEEDED
Status: DISCONTINUED | OUTPATIENT
Start: 2024-12-10 | End: 2024-12-11

## 2024-12-10 RX ORDER — ACETAMINOPHEN 325 MG/1
TABLET ORAL
Status: COMPLETED
Start: 2024-12-10 | End: 2024-12-10

## 2024-12-10 RX ORDER — TRAMADOL HYDROCHLORIDE 50 MG/1
50 TABLET ORAL EVERY 6 HOURS SCHEDULED
Status: DISCONTINUED | OUTPATIENT
Start: 2024-12-10 | End: 2024-12-11

## 2024-12-10 RX ORDER — HYDROMORPHONE HYDROCHLORIDE 1 MG/ML
0.6 INJECTION, SOLUTION INTRAMUSCULAR; INTRAVENOUS; SUBCUTANEOUS EVERY 5 MIN PRN
Status: DISCONTINUED | OUTPATIENT
Start: 2024-12-10 | End: 2024-12-10 | Stop reason: HOSPADM

## 2024-12-10 RX ORDER — TRANEXAMIC ACID 10 MG/ML
1000 INJECTION, SOLUTION INTRAVENOUS ONCE
Status: DISCONTINUED | OUTPATIENT
Start: 2024-12-10 | End: 2024-12-10 | Stop reason: HOSPADM

## 2024-12-10 RX ORDER — OXYCODONE HYDROCHLORIDE 10 MG/1
10 TABLET ORAL EVERY 4 HOURS PRN
Status: DISCONTINUED | OUTPATIENT
Start: 2024-12-10 | End: 2024-12-11

## 2024-12-10 RX ORDER — HYDROMORPHONE HYDROCHLORIDE 1 MG/ML
0.8 INJECTION, SOLUTION INTRAMUSCULAR; INTRAVENOUS; SUBCUTANEOUS EVERY 2 HOUR PRN
Status: DISCONTINUED | OUTPATIENT
Start: 2024-12-10 | End: 2024-12-11

## 2024-12-10 RX ORDER — ACETAMINOPHEN 325 MG/1
650 TABLET ORAL ONCE
Status: COMPLETED | OUTPATIENT
Start: 2024-12-10 | End: 2024-12-10

## 2024-12-10 RX ORDER — DEXAMETHASONE SODIUM PHOSPHATE 4 MG/ML
VIAL (ML) INJECTION AS NEEDED
Status: DISCONTINUED | OUTPATIENT
Start: 2024-12-10 | End: 2024-12-10 | Stop reason: SURG

## 2024-12-10 RX ORDER — DOCUSATE SODIUM 100 MG/1
100 CAPSULE, LIQUID FILLED ORAL 2 TIMES DAILY
Status: DISCONTINUED | OUTPATIENT
Start: 2024-12-10 | End: 2024-12-11

## 2024-12-10 RX ORDER — DEXAMETHASONE SODIUM PHOSPHATE 10 MG/ML
8 INJECTION, SOLUTION INTRAMUSCULAR; INTRAVENOUS ONCE
Status: COMPLETED | OUTPATIENT
Start: 2024-12-11 | End: 2024-12-11

## 2024-12-10 RX ORDER — DIPHENHYDRAMINE HCL 25 MG
25 CAPSULE ORAL EVERY 4 HOURS PRN
Status: DISCONTINUED | OUTPATIENT
Start: 2024-12-10 | End: 2024-12-11

## 2024-12-10 RX ORDER — ACETAMINOPHEN 500 MG
1000 TABLET ORAL ONCE
Status: COMPLETED | OUTPATIENT
Start: 2024-12-10 | End: 2024-12-10

## 2024-12-10 RX ORDER — SENNOSIDES 8.6 MG
17.2 TABLET ORAL NIGHTLY
Status: DISCONTINUED | OUTPATIENT
Start: 2024-12-10 | End: 2024-12-11

## 2024-12-10 RX ORDER — ATORVASTATIN CALCIUM 10 MG/1
10 TABLET, FILM COATED ORAL NIGHTLY
Status: DISCONTINUED | OUTPATIENT
Start: 2024-12-10 | End: 2024-12-11

## 2024-12-10 RX ORDER — ASPIRIN 81 MG/1
81 TABLET ORAL 2 TIMES DAILY
Status: DISCONTINUED | OUTPATIENT
Start: 2024-12-10 | End: 2024-12-11

## 2024-12-10 RX ORDER — POLYETHYLENE GLYCOL 3350 17 G/17G
17 POWDER, FOR SOLUTION ORAL DAILY PRN
Status: DISCONTINUED | OUTPATIENT
Start: 2024-12-10 | End: 2024-12-11

## 2024-12-10 RX ORDER — KETOROLAC TROMETHAMINE 30 MG/ML
30 INJECTION, SOLUTION INTRAMUSCULAR; INTRAVENOUS EVERY 6 HOURS
Status: DISCONTINUED | OUTPATIENT
Start: 2024-12-10 | End: 2024-12-11

## 2024-12-10 RX ORDER — HYDROMORPHONE HYDROCHLORIDE 1 MG/ML
0.4 INJECTION, SOLUTION INTRAMUSCULAR; INTRAVENOUS; SUBCUTANEOUS EVERY 5 MIN PRN
Status: DISCONTINUED | OUTPATIENT
Start: 2024-12-10 | End: 2024-12-10 | Stop reason: HOSPADM

## 2024-12-10 RX ORDER — FERROUS SULFATE 325(65) MG
325 TABLET, DELAYED RELEASE (ENTERIC COATED) ORAL
Status: DISCONTINUED | OUTPATIENT
Start: 2024-12-11 | End: 2024-12-11

## 2024-12-10 RX ORDER — ACETAMINOPHEN 500 MG
TABLET ORAL
Status: DISPENSED
Start: 2024-12-10 | End: 2024-12-11

## 2024-12-10 RX ORDER — SODIUM CHLORIDE, SODIUM LACTATE, POTASSIUM CHLORIDE, CALCIUM CHLORIDE 600; 310; 30; 20 MG/100ML; MG/100ML; MG/100ML; MG/100ML
INJECTION, SOLUTION INTRAVENOUS CONTINUOUS
Status: DISCONTINUED | OUTPATIENT
Start: 2024-12-10 | End: 2024-12-11

## 2024-12-10 RX ORDER — ACETAMINOPHEN 325 MG/1
650 TABLET ORAL
Status: DISCONTINUED | OUTPATIENT
Start: 2024-12-10 | End: 2024-12-11

## 2024-12-10 RX ORDER — PROCHLORPERAZINE EDISYLATE 5 MG/ML
5 INJECTION INTRAMUSCULAR; INTRAVENOUS EVERY 8 HOURS PRN
Status: DISCONTINUED | OUTPATIENT
Start: 2024-12-10 | End: 2024-12-11

## 2024-12-10 RX ORDER — SODIUM CHLORIDE, SODIUM LACTATE, POTASSIUM CHLORIDE, CALCIUM CHLORIDE 600; 310; 30; 20 MG/100ML; MG/100ML; MG/100ML; MG/100ML
INJECTION, SOLUTION INTRAVENOUS CONTINUOUS
Status: DISCONTINUED | OUTPATIENT
Start: 2024-12-10 | End: 2024-12-10 | Stop reason: HOSPADM

## 2024-12-10 RX ORDER — LIDOCAINE HYDROCHLORIDE 10 MG/ML
INJECTION, SOLUTION EPIDURAL; INFILTRATION; INTRACAUDAL; PERINEURAL AS NEEDED
Status: DISCONTINUED | OUTPATIENT
Start: 2024-12-10 | End: 2024-12-10 | Stop reason: SURG

## 2024-12-10 RX ORDER — ONDANSETRON 2 MG/ML
4 INJECTION INTRAMUSCULAR; INTRAVENOUS EVERY 6 HOURS PRN
Status: DISCONTINUED | OUTPATIENT
Start: 2024-12-10 | End: 2024-12-11

## 2024-12-10 RX ORDER — OXYCODONE HYDROCHLORIDE 5 MG/1
5 TABLET ORAL EVERY 4 HOURS PRN
Status: DISCONTINUED | OUTPATIENT
Start: 2024-12-10 | End: 2024-12-11

## 2024-12-10 RX ORDER — NAPROXEN 500 MG/1
500 TABLET ORAL 2 TIMES DAILY WITH MEALS
Status: DISCONTINUED | OUTPATIENT
Start: 2024-12-11 | End: 2024-12-11

## 2024-12-10 RX ORDER — HYDROMORPHONE HYDROCHLORIDE 1 MG/ML
0.2 INJECTION, SOLUTION INTRAMUSCULAR; INTRAVENOUS; SUBCUTANEOUS EVERY 5 MIN PRN
Status: DISCONTINUED | OUTPATIENT
Start: 2024-12-10 | End: 2024-12-10 | Stop reason: HOSPADM

## 2024-12-10 RX ORDER — KETOROLAC TROMETHAMINE 30 MG/ML
INJECTION, SOLUTION INTRAMUSCULAR; INTRAVENOUS AS NEEDED
Status: DISCONTINUED | OUTPATIENT
Start: 2024-12-10 | End: 2024-12-10 | Stop reason: SURG

## 2024-12-10 RX ORDER — DIPHENHYDRAMINE HYDROCHLORIDE 50 MG/ML
25 INJECTION INTRAMUSCULAR; INTRAVENOUS ONCE AS NEEDED
Status: ACTIVE | OUTPATIENT
Start: 2024-12-10 | End: 2024-12-10

## 2024-12-10 RX ADMIN — MIDAZOLAM HYDROCHLORIDE 2 MG: 1 INJECTION INTRAMUSCULAR; INTRAVENOUS at 14:53:00

## 2024-12-10 RX ADMIN — BUPIVACAINE HYDROCHLORIDE 2 ML: 7.5 INJECTION, SOLUTION INTRASPINAL at 14:58:00

## 2024-12-10 RX ADMIN — KETOROLAC TROMETHAMINE 30 MG: 30 INJECTION, SOLUTION INTRAMUSCULAR; INTRAVENOUS at 16:53:00

## 2024-12-10 RX ADMIN — DEXAMETHASONE SODIUM PHOSPHATE 4 MG: 4 MG/ML VIAL (ML) INJECTION at 15:12:00

## 2024-12-10 RX ADMIN — LIDOCAINE HYDROCHLORIDE 50 MG: 10 INJECTION, SOLUTION EPIDURAL; INFILTRATION; INTRACAUDAL; PERINEURAL at 15:04:00

## 2024-12-10 NOTE — ANESTHESIA PROCEDURE NOTES
Spinal Block    Date/Time: 12/10/2024 2:55 PM    Performed by: Mp Paul MD  Authorized by: Mp Paul MD      General Information and Staff    Start Time:  12/10/2024 2:55 PM  End Time:  12/10/2024 2:59 PM  Anesthesiologist:  Mp Paul MD  Performed by:  Anesthesiologist  Site identification: surface landmarks    Preanesthetic Checklist: patient identified, IV checked, risks and benefits discussed, monitors and equipment checked, pre-op evaluation, timeout performed, anesthesia consent and sterile technique used      Procedure Details    Patient Position:  Sitting  Prep: ChloraPrep    Monitoring:  Cardiac monitor, heart rate and continuous pulse ox  Approach:  Right paramedian  Location:  L3-4  Injection Technique:  Single-shot    Needle    Needle Type:  Sprotte  Needle Gauge:  24 G  Needle Length:  3.5 in    Assessment    Sensory Level:   Events: clear CSF, CSF aspirated, well tolerated and blood negative      Additional Comments

## 2024-12-10 NOTE — OPERATIVE REPORT
Operative Note    Patient Name/: Fozia Ivan 10/20/1965  Date: 12/10/2024  Location: [facility]  Preoperative Diagnosis: Left hip osteoarthritis  Postoperative Diagnosis: Same  Operation: Anterior left total hip arthroplasty  Primary Surgeon: Ruddy Sanchez  Assistant:  Aj TSAI surgical assistant first assist. Please note a skilled surgical assistant was necessary for this case in order to assist with patient positioning, prepping, draping, incision, exposure, implant placement, wound closure.  Indications: 59-year-old female with end-stage left hip osteoarthritis failed nonsurgical treatment  Surgical Findings: End-stage left hip OA  Operative Report:  After informed consent, the left lower extremity was marked.  Patient was brought to the operating room where spinal anesthesia was induced.  Patient was placed supine on the [place] table with the operative side arm across the chest.  The left lower extremity was prepped and draped in sterile fashion.  Timeout was performed to verify correct surgical site and procedure.  2 g of Ancef was given within 1 hour of incision.  Additionally, 1 g tranexamic acid was given intravenously.  Next an anterior approach incision was performed, starting 2 fingerbreadths distal and lateral to the ASIS and carried along the trajectory of the tensor muscle, through skin and subcutaneous tissue down to the fascia overlying the tensor.  Superficial bleeders were coagulated.  Incision was performed through the fascia in line with the tensor, and the medial fascia was elevated off the muscle.  An Henri-Derick retractor was placed between the tensor and medial fascia, and the deep fascia overlying the rectus femoris was incised.  The retractor was replaced between rectus and tensor.  Pre-capsular fat was dissected through, with care taken to identify the ascending branch of the lateral femoral circumflex vessels.  These were tied off and coagulated.  Anterior hip capsule was  identified, and retractors were placed above and below the neck, as well as one medial retractor.  Full-thickness capsulotomy was performed and reflected superiorly.  Retractors were replaced within the capsule.  Next the neck cut was performed, the limb was pulled into traction and externally rotated, and the head was freed.  A corkscrew was placed and the head and neck were flexed to allow dissection of posterior capsule off the neck.  The head was subsequently removed.  This measured 48 mm.  The pubo-femoral ligament was then subperiosteally dissected off the femur, externally rotating 90 degrees until the lesser trochanter was identified.  The capsular limb was retracted posteriorly as the reflected head of rectus tendon was partially dissected subperiosteally off the anterior acetabular rim.  Next a deep Charnley retractor was placed, and traction was removed from the limb.  Remaining labrum was removed from around the rim, as well as pulvinar from the base of the acetabulum.  I began reaming under fluoroscopic guidance, starting with a 49 reamer to medialize followed by reaming for position, and reamed by 2 up to a 53 when I had good circumferential fit with bleeding bone anterior, posterior and superior.  A size 54 cup was then placed, using anatomic landmarks and fluoroscopic guidance aiming for 40 degrees abduction and 20 degrees anteversion.  The cup was impacted down to the bone, and was seated with good coverage both anterior and posterior.  2 screws were placed in the posterior superior quadrant.  A 40 mm inner diameter liner was impacted into place.  The cup was tucked under a few millimeters of anterior acetabular lip of bone.  Next, the elevator arm was attached to the table and placed under the proximal femur.  The femur was externally rotated 90 degrees and retractors were placed.  I performed a full-thickness capsular incision starting at the medial base of the greater trochanter near our neck  cut, and progressing superiorly.  I subperiosteally dissected piriformis and short external rotators off the posterior aspect of the greater trochanter.  This allowed for appropriate external rotation of the limb.  We then dropped the limb to the floor, and externally rotated to gain exposure to the proximal femur.  Soft tissue was removed from the medial aspect of the greater trochanter.  A canal finder was used to initially open the canal as well as determine the trajectory for our stem.  I then began broaching for a echo Bi-Metric Microplasty stem.  I broached up to a size 12 which gave a good fit and fill.  There was a change in pitch with impacting the broach, as well as no further sinking of the broach.  It had good axial control as well.  We trialed with a high offset neck and -6 head.  Following reduction of the hip, fluoroscopy was used to verify stem fit as well as restoration of leg length and offset.  Printed out overlays demonstrated spot on equal length and offset.  The hip was externally rotated 90 degrees and anterior dislocation was attempted with a bone hook, and it was stable.  Additionally the hip was stable at 90 degrees external rotation and 20 degrees extension.  This was determined to be the appropriate size, and the hip was subsequently dislocated with traction and external rotation.  The broach was found to be stable, and we therefore opened up the final stem.  This was impacted down in the same rotation as the broach trial.  The final stem sat up 2 to 3 mm, just as the broach did.  I trialed head length again and noted that the -6 gave our closest approximation of leg length and offset.  This also provided a stable hip.  The hip was redislocated, head trial was removed, trunnion was washed and dried, and the final head was impacted into place.  The cup was washed and suctioned, and the hip was reduced.  Betadine lavage was performed, followed by injection of periarticular pain cocktail.   The anterior capsule was laid back across the joint, and closure was performed with running 2-0 Vicryl for fascia, 2-0 Vicryl interrupted for skin, and running Monocryl, followed by Dermabond and Steri-Strips and an Aquasol dressing.  The patient was brought to PACU in good condition.  Anesthesia: Spinal  Complications: None  Specimens: None  Blood loss: 50 mL  Fluids: See anesthesia report  Implants: Christina Biomet echo Bi-Metric Microplasty 12 high offset stem, 40 mm -6 ceramic head, 54 mm G7 shell  Drains: None  Condition: Good  Disposition: Floor    -Weightbearing as tolerated, no hip precautions, PT OT  -DVT prophylaxis mechanical plus aspirin twice daily  -Pain control with oral medications  -Perioperative Ancef    Ruddy Sanchez MD, FAAOS  [facility]  Phone [phone]  Fax [phone]

## 2024-12-10 NOTE — INTERVAL H&P NOTE
Pre-op Diagnosis: Primary osteoarthritis of left hip [M16.12]    The above referenced H&P was reviewed by Ruddy Sanchez MD on 12/10/2024, the patient was examined and no significant changes have occurred in the patient's condition since the H&P was performed.  I discussed with the patient and/or legal representative the potential benefits, risks and side effects of this procedure; the likelihood of the patient achieving goals; and potential problems that might occur during recuperation.  I discussed reasonable alternatives to the procedure, including risks, benefits and side effects related to the alternatives and risks related to not receiving this procedure.  We will proceed with procedure as planned.

## 2024-12-10 NOTE — DISCHARGE INSTRUCTIONS
Sometimes managing your health at home requires assistance.  The [facility] team has recognized your preference to use [facility].  They can be reached by phone at [phone].  The fax number for your reference is [phone].. A representative from the home health agency will contact you or your family to schedule your first visit.        Joint Replacement Surgery: Patient Discharge Instructions   The best way to contact your surgeon or their team is by phone [phone] (preferred for urgent/acute matters) or through [employer].    Dear Patient,     [facility] cares about your progress with recovery following your joint replacement surgery.     300 days from your scheduled surgery, [facility] will send you a follow-up survey to help us understand how your surgery impacted your mobility, pain, and overall quality of life. Please make every effort to complete this survey. The information collected from this survey will be used by your physician to track your recovery.     Sincerely,     [facility] [place]    What to Expect:  A certain amount of pain, swelling, and bruising is expected for several weeks after surgery.    Pain Medications:   Below is a list of commonly prescribed pain medications. You may have received prescriptions for some, all, or even additional pain medications not listed.  If you are taking the following medications for pain, these are some general guidelines for using and discontinuing them.  You may also want to preemptively take your pain medication before physical therapy (at least 30 minutes prior to the session).  If you are concerned you are suffering side effects from any of these medications please contact your surgeon’s office:    Ultram (Tramadol): Take this as prescribed 3 times per day with meals until your first postoperative appointment. If your pain levels are low, you can stop taking this on a scheduled  basis and start taking it as needed.  Tylenol (Acetaminophen): Take this as prescribed 3 times per day until your first postoperative appointment. Do NOT exceed 4g (4000mg) of acetaminophen daily.  Celebrex (Celecoxib): this medication will treat swelling and pain and should be taken as directed until your first postoperative appointment.  Oxycodone IR (immediate release): This is your “rescue” drug. Take this only as needed for pain that is not controlled (rated more than 4 out of 10) by other medications.  You may wean yourself off prescription pain medication to a non-prescription pain reliever by substituting two 500mg extra-strength Tylenol (Acetaminophen) tablets in place of your prescription pain medications up to four times per day. Do NOT exceed 4g (4000mg) of acetaminophen daily.  *We will discuss pain management and weaning off pain medicine at your 1st postoperative appointment. To avoid delays in getting your narcotic pain medicine refilled, please contact the office prior to running out of medication either by phone [phone] or through [employer]. Please allow 24-72 hours for prescriptions to be filled. Your doctor may need to physically sign a hand-written prescription -- some medicines cannot be re-ordered electronically/or via phone. If you need to  a hand-written prescription, you can do so at the office located at [address] [place] [address], [address].  Pain Medications can be constipating. Below is a guideline for preventing or managing postoperative constipation:  Drink plenty of fluids. Aim to drink at least of 8oz of water 8 times per day (total of 64oz).  Eat a high-fiber diet (whatever helps you stay regular).  Make sure you are taking Senekot S (Docusate Sodium + Sennosides) or Colace (Docusate Sodium), 1-2 tablets twice daily, while on narcotics. You may decrease to once daily if you develop diarrhea. You will be sent home with a prescription.  If you have not had normal  bowel movements the following over the counter medications can be helpful:  Add daily Miralax™ or Metamucil™ as directed on bottle.  If still no results, add a dose of Milk of Magnesia™ as directed on bottle.  If still no results, take one Dulcolax™ (Bisacodyl) suppository as directed on package.  If you still have no results and it has been more than 3 days since you had a bowel movement, be sure to contact your surgeon's office.    Swelling:  You may apply a cloth covered ice pack for up to 20 minutes each hour, or as needed, to your incision to help control pain and swelling. Do not apply directly to your skin.  For the first 7 days after you leave the hospital, it is critical that you elevate your leg above the level of your heart 4 times per day for 1 hour each time. After a week, elevate your leg as needed if swelling is noted.   Call your Surgeons office if you have:  A temperature greater than 100.4 F.  Increasing pain or numbness at the incision or on your operated leg.  Increasing redness, drainage, or odor from the wound.  You WILL be swollen the first week or two after surgery; however, swelling that continues to get worse to your surgical leg or the opposite leg and is accompanied by discomfort or redness should be reported.  **Go to the Emergency Room if you have chest pain or shortness of breath**  Activities:  Your activity order is:   Weight bearing as tolerated  If you have home health care, a physical therapist (PT) will come to your home 2-3 times per week. The number of PT visits will depend on your needs and your insurance coverage.   At your first postoperative appointment with your surgeon, you will be given a prescription for outpatient physical therapy if you have not already started outpatient therapy.  Rules of the road: No driving until it is approved by your care team, and you are no longer taking narcotic pain medicine.    Wound Care/ Bathing:   Aquacel (waterproof brown rubberized  dressing) should remain in place for 7 days and may then be removed. While this waterproof dressing is in place, you may shower and let water run over the dressing (ensure first that the dressing seal is intact and none of the edges have rolled up exposing the wound - if the dressing has become open to the environment, do not allow water to enter into this area)  Once the original postoperative dressing is removed, you do not need to keep your incision covered. If you would like to keep it covered for comfort you may - use a clean new dressing each day, or more frequently if needed (if the dressing is soiled or it is not staying fixed to your skin). Use the dressings suggested by the nurse at discharge.  After the original postoperative dressing is removed, you should continue to keep the incision covered when showering to prevent it from getting wet prior to your first postoperative appointment. Before showering, be sure to cover the incision with saran wrap or a plastic bag to keep dry. After showering, pat the incision with a clean towel to ensure it is dry. Do this until you are cleared to get the incision wet (usually after the first postoperative appointment).  Once cleared to get the incision wet, you may gently allow soap and water to rinse over the area. Be sure to rinse the area well and gently pat dry.  Do not apply soap, lotion, cream, ointments, or powders to your incision. Keep the incision clean and dry.  Do not soak your incision in water. No tub baths, pools or hot tubs for at least 6 weeks after surgery and not until the wound is completely healed.  If you have stitches or staples, they will be removed at your first postoperative appointment or the week after. Do not remove steri-strips, if you have any. These will come off on their own and do not need to be replaced.  If you have a home care nurse they should:  Examine the incision during visits and call your surgeon if there are any signs of  infection or other cause for concern.  Change your dressing and may remove staples (when indicated).  Take your vitals and draw your labs.    Remember, good hand washing with soap at all times helps prevent infections. Wash your hands with soap and water prior to performing any dressing changes or wound evaluation.    Medications/Special Instructions:  Do not restart your blood pressure medication until cleared by your physician, or until your systolic blood pressure (top number) is above 130 on 2 consecutive checks and then continue your medication as prescribed.    Blood Thinners (you will be on blood thinners for a total of six weeks):  Aspirin: some patients will be prescribed Aspirin to prevent blood clots after surgery. If you are prescribed aspirin, take one 81mg tablet by mouth twice per day for six weeks.  DO NOT restart xewwm3k/fish oils, glucosamine, nonsteroidal antiinflammatories [NSAIDs, such as such as Ibuprofen (Advil, Motrin), Naproxen (Naprosyn, Aleve), Diclofenac (Voltaren), Meloxicam (Mobic)], tumeric, cinnamon, progesterone, estrogen, or Aspirin (unless told differently) until you have completed your blood thinner. These will increase your risk of bleeding.   Protonix (Omeprazole/Nexium, etc.) may be ordered if Aspirin is to be continued while on your blood thinner and should be taken daily during that time. This will help to protect your stomach.    X-Ray  X-rays needed: none    If you do not have a follow-up appointment with your surgeon, or you need to change your follow-up appointment date/time, please call today to schedule or change this appointment: [phone]. Our phones are open to schedule appointments from 8:30am to 4:30pm, Monday through Friday.  If you have any questions or concerns during the postoperative period (for example: wound issues, pain, swelling, redness or drainage) call our office FIRST at [phone] so that we may appropriately direct you or set up a clinic  appointment.

## 2024-12-10 NOTE — ANESTHESIA PREPROCEDURE EVALUATION
PRE-OP EVALUATION    Patient Name: Fozia Ivan    Admit Diagnosis: Primary osteoarthritis of left hip [M16.12]    Pre-op Diagnosis: Primary osteoarthritis of left hip [M16.12]    Left anterior total hip arthroplasty    Anesthesia Procedure: Left anterior total hip arthroplasty (Left: Hip)    Surgeons and Role:     * Ruddy Sanchez MD - Primary    Pre-op vitals reviewed.  Temp: 98 °F (36.7 °C)  Pulse: 78  Resp: 16  BP: 121/70  SpO2: 100 %  Body mass index is 23.88 kg/m².    Current medications reviewed.  Hospital Medications:   [COMPLETED] acetaminophen (Tylenol Extra Strength) tab 1,000 mg  1,000 mg Oral Once    lactated ringers infusion   Intravenous Continuous    [Transfer Hold] tranexamic acid in sodium chloride 0.7% (Cyklokapron) 1000 mg/100mL infusion premix 1,000 mg  1,000 mg Intravenous Once    vancomycin (Vancocin) 1,000 mg in sodium chloride 0.9% 250 mL IVPB-ADDV  15 mg/kg Intravenous Once    And    gentamicin (Garamycin) 280 mg in sodium chloride 0.9% 100 mL IVPB  5 mg/kg (Ideal) Intravenous Once    acetaminophen (Tylenol Extra Strength) 500 MG tab        [COMPLETED] ceFAZolin (Ancef) 2g in 10mL IV syringe premix  2 g Intravenous Once    povidone-iodine (Betadine) 10 % 17.5 mL in sodium chloride 0.9 % 500 mL irrigation    PRN    clonidine/epinephrine/ropivacaine/ketorolac in 0.9% NaCl 60 mL pain cocktail syringe for hip arthroplasty    PRN       Outpatient Medications:   Prescriptions Prior to Admission[1]    Allergies: Cephalexin and Penicillins      Anesthesia Evaluation    Patient summary reviewed.    Anesthetic Complications  (-) history of anesthetic complications         GI/Hepatic/Renal                                 Cardiovascular      ECG reviewed.  Exercise tolerance: good     MET: >4                                           Endo/Other                                  Pulmonary                           Neuro/Psych                                      Past Surgical History:   Procedure  Laterality Date    Laser surgery of cervix      genital warts removal    Thompson localization wire 1 site right (cpt=19281)      CYST     Other surgical history  age 7    urethra surgery    Total hip replacement Right 09/2024    Tubal ligation  2005     Social History     Socioeconomic History    Marital status: Single   Tobacco Use    Smoking status: Former     Current packs/day: 1.00     Types: Cigarettes    Smokeless tobacco: Never    Tobacco comments:     Updated 5/3/24   Vaping Use    Vaping status: Never Used   Substance and Sexual Activity    Alcohol use: Yes     Alcohol/week: 1.0 standard drink of alcohol     Types: 1 Glasses of wine per week     Comment: occasionally    Drug use: No    Sexual activity: Yes     Partners: Male     Birth control/protection: Tubal Ligation     History   Drug Use No     Available pre-op labs reviewed.  Lab Results   Component Value Date    WBC 7.3 11/22/2024    RBC 5.35 (H) 11/22/2024    HGB 15.0 11/22/2024    HCT 46.6 11/22/2024    MCV 87.1 11/22/2024    MCH 28.0 11/22/2024    MCHC 32.2 11/22/2024    RDW 13.4 11/22/2024    .0 11/22/2024     Lab Results   Component Value Date     11/22/2024    K 4.3 11/22/2024     11/22/2024    CO2 30.0 11/22/2024    BUN 19 11/22/2024    CREATSERUM 0.86 11/22/2024    GLU 98 11/22/2024    CA 10.5 (H) 11/22/2024     Lab Results   Component Value Date    INR 1.04 11/22/2024         Airway      Mallampati: II  Mouth opening: 3 FB  TM distance: 4 - 6 cm  Neck ROM: full Cardiovascular      Rhythm: regular  Rate: normal     Dental             Pulmonary      Breath sounds clear to auscultation bilaterally.               Other findings              ASA: 2   Plan: spinal  NPO status verified and patient meets guidelines.  Patient has not taken beta blockers in last 24 hours.  Post-procedure pain management plan discussed with surgeon and patient.      Plan/risks discussed with: patient                Present on Admission:  **None**              [1]   Medications Prior to Admission   Medication Sig Dispense Refill Last Dose/Taking    senna-docusate 8.6-50 MG Oral Tab Take 1 tablet by mouth 2 (two) times daily as needed. 30 tablet 0 12/9/2024 at  7:00 PM    traMADol 50 MG Oral Tab Take 1 tablet (50 mg total) by mouth every 6 (six) hours as needed for Pain. 60 tablet 0 12/9/2024 at  7:00 PM    atorvastatin 10 MG Oral Tab Take 1 tablet (10 mg total) by mouth daily. 90 tablet 3 12/9/2024    celecoxib 200 MG Oral Cap Take 1 capsule (200 mg total) by mouth 2 (two) times daily. 180 capsule 3 12/9/2024 at  7:00 PM

## 2024-12-10 NOTE — ANESTHESIA POSTPROCEDURE EVALUATION
[facility]    Fozia Ivan Patient Status:  Outpatient in a Bed   Age/Gender 59 year old female MRN [id number]   Location [facility] SURGERY Attending Ruddy Sanchez MD   Hosp Day # 0 PCP Marta Kidd MD       Anesthesia Post-op Note    Left anterior total hip arthroplasty    Procedure Summary       Date: 12/10/24 Room / Location:  MAIN OR 19 / [facility] MAIN OR    Anesthesia Start: 1451 Anesthesia Stop:     Procedure: Left anterior total hip arthroplasty (Left: Hip) Diagnosis:       Primary osteoarthritis of left hip      (Primary osteoarthritis of left hip [M16.12])    Surgeons: Ruddy Sanchez MD Anesthesiologist: Mp Paul MD    Anesthesia Type: spinal ASA Status: Not recorded            Anesthesia Type: spinal    Vitals Value Taken Time   /70 12/10/24 1717   Temp 98 °F (36.7 °C) 12/10/24 1717   Pulse 78 12/10/24 1717   Resp 16 12/10/24 1717   SpO2 100 % 12/10/24 1717       Patient Location: PACU    Anesthesia Type: spinal    Airway Patency: patent    Postop Pain Control: adequate    Mental Status: mildly sedated but able to meaningfully participate in the post-anesthesia evaluation    Nausea/Vomiting: none    Cardiopulmonary/Hydration status: stable euvolemic    Complications: no apparent anesthesia related complications    Postop vital signs: stable    Dental Exam: Unchanged from Preop    Patient to be discharged from PACU when criteria met.

## 2024-12-11 VITALS
HEIGHT: 64 IN | BODY MASS INDEX: 23.75 KG/M2 | WEIGHT: 139.13 LBS | RESPIRATION RATE: 16 BRPM | HEART RATE: 59 BPM | TEMPERATURE: 99 F | SYSTOLIC BLOOD PRESSURE: 134 MMHG | OXYGEN SATURATION: 100 % | DIASTOLIC BLOOD PRESSURE: 83 MMHG

## 2024-12-11 PROCEDURE — 99024 POSTOP FOLLOW-UP VISIT: CPT | Performed by: PHYSICIAN ASSISTANT

## 2024-12-11 RX ORDER — ACETAMINOPHEN 500 MG
1000 TABLET ORAL EVERY 8 HOURS
Qty: 90 TABLET | Refills: 0 | Status: SHIPPED | OUTPATIENT
Start: 2024-12-11 | End: 2024-12-26

## 2024-12-11 RX ORDER — TRAMADOL HYDROCHLORIDE 50 MG/1
50 TABLET ORAL EVERY 6 HOURS PRN
Qty: 60 TABLET | Refills: 0 | Status: SHIPPED | OUTPATIENT
Start: 2024-12-11

## 2024-12-11 RX ORDER — SENNA AND DOCUSATE SODIUM 50; 8.6 MG/1; MG/1
1 TABLET, FILM COATED ORAL DAILY PRN
Qty: 30 TABLET | Refills: 0 | Status: SHIPPED | OUTPATIENT
Start: 2024-12-11

## 2024-12-11 RX ORDER — OXYCODONE HYDROCHLORIDE 5 MG/1
5 TABLET ORAL EVERY 4 HOURS PRN
Qty: 30 TABLET | Refills: 0 | Status: SHIPPED | OUTPATIENT
Start: 2024-12-11

## 2024-12-11 RX ORDER — ASPIRIN 81 MG/1
81 TABLET ORAL 2 TIMES DAILY
Qty: 80 TABLET | Refills: 0 | Status: SHIPPED | OUTPATIENT
Start: 2024-12-11 | End: 2025-01-20

## 2024-12-11 NOTE — PLAN OF CARE
Patient alert and oriented x4. VSS on RA. Pain controlled with scheduled meds. Denies n/t. Aquacel dressing to left hip, CDI. Gel ice at bedside. SCDs in place, ankle pumps encouraged, IS encouraged. Pt up with standby and the walker. Voiding freely in bathroom. Tolerating diet, no c/o n/v.     Plan: PT/OT val

## 2024-12-11 NOTE — PROGRESS NOTES
NURSING DISCHARGE NOTE    Discharged Home via Wheelchair.  Accompanied by Support staff  Belongings Taken by patient/family.    Patient cleared for discharge by ortho and PT/OT. IV removed. Patient educated on all AVS discharge information, all questions answered. Meds to beds delivered. Instructed patient to  one medication from pharmacy. Spanda  placed on pts left leg by Liliane BARON. [facility] be driven home by daughter.

## 2024-12-11 NOTE — CM/SW NOTE
12/11/24 0800   CM/SW Referral Data   Referral Source Social Work (self-referral)   Reason for Referral Discharge planning   Informant EMR;Clinical Staff Member   Discharge Needs   Anticipated D/C needs Home health care       Patient is a 58 y/o woman admitted s/p THR.  Pt with pre-operative plan for [facility]  PT eval pending.  Referral sent to [facility] via [facility].  Await confirmation of acceptance and therapy evals for further DC planning.  / to remain available for support and/or discharge planning.     Christine Wheeler, [facility]  Discharge Planner  [phone]

## 2024-12-11 NOTE — CM/SW NOTE
Met with patient/family and provided [facility] information for [facility] (aka [facility]).  Pt declined HHC and stated that she will be going straight to outpatient PT.  She has her first appointment scheduled for Friday. No further DC needs/concerns identified at this time.  Updated [facility] via [facility].  SW available should additional discharge needs arise.    Christine Wheeler, [facility]  Discharge Planner  [phone]

## 2024-12-11 NOTE — PHYSICAL THERAPY NOTE
PHYSICAL THERAPY EVALUATION - INPATIENT     Room Number: 362/362-A  Evaluation Date: 2024  Type of Evaluation: Initial  Physician Order: PT Eval and Treat    Presenting Problem: L hip OA s/p L anterior LILLIAM  Co-Morbidities : DDD, DL, diverticulitis, R LILLIAM 24  Reason for Therapy: Mobility Dysfunction and Discharge Planning    PHYSICAL THERAPY ASSESSMENT   Patient is a 59 year old female admitted 12/10/2024 for left hip osteoarthritis s/p elective L anterior LILLIAM.   Patient is currently able to complete bed mobility, transfers, ambulation, stoop and stairs with supervision to modified independence.  Thus, the pt has no further inpatient therapy needs and will be discharged from inpatient therapy service.    Patient will benefit from continued skilled PT Services at discharge to promote prior level of function and safety with additional support and return home with OP PT.    PLAN  Patient has been evaluated and presents with no inpatient physical therapy needs.  Patient discharged from inpatient physical therapy services at this time.   Please reconsult if there is a change in status that would necessitate PT services.      PT Device Recommendation: Rolling walker    GOALS  Patient was able to achieve the following goals ...    Patient was able to transfer With Mod I   Patient able to ambulate on level surfaces With Mod I     HOME SITUATION  Type of Home: [facility]  Home Layout: Two level  Stairs to Enter : 2   Railing: No    Stairs to Bedroom: 14    Railing: Yes    Lives With: Daughter    Drives: Yes   Patient Regularly Uses: Glasses     Prior Level of [place]: The pt is typically independent with ambulation and I/ADL's.     SUBJECTIVE  Pt reports she feels safe and ready to discharge home with family support.    OBJECTIVE  Precautions: None  Fall Risk: Standard fall risk    WEIGHT BEARING RESTRICTION  L Lower Extremity: Weight Bearing as Tolerated    PAIN ASSESSMENT  Ratin  Location: L hip,  buttock, and leg  Management Techniques: Activity promotion;Repositioning    COGNITION  Overall Cognitive Status:  WNL    RANGE OF MOTION AND STRENGTH ASSESSMENT  Upper extremity ROM and strength are within normal limits, grossly 5/5    Lower extremity ROM is within functional limits     Lower extremity strength is within functional limits except for the following:    Left Hip flexion  4-/5  Left Knee extension  4-/5    BALANCE  Static Sitting: Good  Dynamic Sitting: Good  Static Standing: Fair +  Dynamic Standing: Fair -      AM-PAC '6-Clicks' INPATIENT SHORT FORM - BASIC MOBILITY  How much difficulty does the patient currently have...  Patient Difficulty: Turning over in bed (including adjusting bedclothes, sheets and blankets)?: None   Patient Difficulty: Sitting down on and standing up from a chair with arms (e.g., wheelchair, bedside commode, etc.): None   Patient Difficulty: Moving from lying on back to sitting on the side of the bed?: None   How much help from another person does the patient currently need...   Help from Another: Moving to and from a bed to a chair (including a wheelchair)?: None   Help from Another: Need to walk in hospital room?: None   Help from Another: Climbing 3-5 steps with a railing?: A Little       AM-PAC Score:  Raw Score: 23   Approx Degree of Impairment: 11.2%   Standardized Score (AM-PAC Scale): 56.93   CMS Modifier (G-Code): CI    FUNCTIONAL ABILITY STATUS  Gait Assessment   Functional Mobility/Gait Assessment  Gait Assistance: Modified independent  Distance (ft): 300  Assistive Device: Rolling walker  Pattern:  (step-through gait pattern)  Stairs: Stairs;Stoop/curb  How Many Stairs: 12  Device: 1 Rail  Assist: Supervision  Pattern: Ascend and Descend  Ascend and Descend : Step to  Stoop/Curb: with SBA using a RW with step-to gait    Skilled Therapy Provided     Transfer Mobility:  Sit to stand: Mod I   Stand to sit: Mod I  Gait: Mod I  Stoop/Stairs: CGA initially with verbal  cues for step sequencing, but able to progress to SBA    Therapist's comments:Pt's daughter, Edith, present throughout session.  Pt and daughter educated on WBAT precautions, guarding techniques for stairs, stair step sequencing.    Patient End of Session: Up in chair;Needs met;Call light within reach;RN aware of session/findings;All patient questions and concerns addressed;Family present    Patient Evaluation Complexity Level:  History High - 3 or more personal factors and/or co-morbidities   Examination of body systems Low -  addressing 1-2 elements   Clinical Presentation Low- Stable   Clinical Decision Making Low Complexity       PT Session Time: 30 minutes  Gait Training: 15 minutes

## 2024-12-11 NOTE — PROGRESS NOTES
[facility] Progress Note    Subjective: Patient doing well. Pain well controlled on oral medication. Has worked with and cleared PT/OT. Tolerating diet, voiding independently.    Objective: Laying comfortably in hospital chair. Alert and oriented. Firing BL TA/EHL. Dressing C/D/I.    Assessment/Plan: 59 year old female postop day #1 status post left anterior total hip arthroplasty.    -Weightbearing as tolerated, no hip precautions, PT OT  -DVT prophylaxis mechanical plus aspirin twice daily  -Pain control with oral medications  -Perioperative Ancef  Disposition: Home today    Lion Huerta PA-C  [facility]  Phone [phone]  Fax [phone]

## 2024-12-11 NOTE — OCCUPATIONAL THERAPY NOTE
OCCUPATIONAL THERAPY EVALUATION - INPATIENT    Room Number: 362/362-A  Evaluation Date: 12/11/2024     Type of Evaluation: Initial  Presenting Problem: s/p L LILLIAM 12/10/24    Physician Order: IP Consult to Occupational Therapy  Reason for Therapy:  ADL/IADL Dysfunction and Discharge Planning    OCCUPATIONAL THERAPY ASSESSMENT   Patient is a 59 year old female admitted on 12/10/2024 with Presenting Problem: s/p L LILLIAM 12/10/24. Co-Morbidities : DDD, R LILLIAM Sept '24  Patient is currently functioning near baseline with toileting, lower body dressing, and dynamic standing balance.  Prior to admission, patient's baseline is indepndent.  Patient met all OT goals at supervision to Mod I level.  Patient reports no further questions/concerns at this time.     Patient will benefit from continued skilled OT Services with no additional skilled services but increased support at home    Recommendations for nursing staff:   Transfers: 1-person  Toileting location: Toilet    EVALUATION SESSION:  Patient at start of session: supine    FUNCTIONAL TRANSFER ASSESSMENT  Sit to Stand: Edge of Bed; Chair  Edge of Bed: Supervision  Chair: Supervision  Toilet Transfer: Supervision (standard height, light use of grab bar, reports taller toilet with counter adjacent at home)    BED MOBILITY  Supine to Sit : Modified Independent  Sit to Supine (OT): Modified Independent    BALANCE ASSESSMENT     FUNCTIONAL ADL ASSESSMENT  Eating: Modified Independent  Grooming Standing: Modified Independent  UB Dressing Seated: Modified Independent  LB Dressing Seated: Supervision (without AE for underwear, pants, R sock and slip-on shoes; slightly decreased reach for L sock management, reports aware of sock aide but plans for daughter to assist)  LB Dressing Standing: Supervision  Toileting Seated: Supervision  Toileting Standing: Supervision    ACTIVITY TOLERANCE: WFL                         O2 SATURATIONS       COGNITION  Overall Cognitive Status:  WFL -  within functional limits    COGNITION ASSESSMENTS     Upper Extremity:   ROM: within functional limits   Strength: is within functional limits     EDUCATION PROVIDED  Patient Education : Role of Occupational Therapy; Functional Transfer Techniques; Fall Prevention; Weight Bear Status; Posture/Positioning  Patient's Response to Education: Verbalized Understanding (daughter also present)    Equipment used: RW  Demonstrates functional use    Therapist comments: Patient motivated and participatory. Educated on safety precautions and incorporation into ADLs and transfers, followed with good return demo. Patient performed all ADLs and functional transfers at Supervision to Mod I level. Patient aware of recommendation for initial supervision at discharge, including supervision for shower transfers and increased assist with IADLs; patient reports will have initial supervision/assist as needed from daughter at discharge, reports son also coming home from college soon. Patient reports no further questions or concerns regarding discharge home to ADLs.     Patient End of Session: Up in chair;Needs met;Call light within reach;RN aware of session/findings;All patient questions and concerns addressed;Family present    OCCUPATIONAL PROFILE    HOME SITUATION  Type of Home: [facility]  Home Layout: One level  Lives With: Daughter    Toilet and Equipment: Comfort height toilet  Shower/Tub and Equipment: Tub-shower combo;Grab bar  Other Equipment: None    Occupation/Status: HR at [employer]     Drives: Yes  Patient Regularly Uses: Glasses    Prior Level of Function: Patient reports independent in all I/ADL and functional mobility without device prior to admission.    SUBJECTIVE  \"I remember a lot of this from last time\"    PAIN ASSESSMENT  Ratin  Location: L hip with movement  Management Techniques: Activity promotion;Body mechanics;Repositioning;Nurse notified;Ice    OBJECTIVE  Precautions: None  Fall Risk: Standard fall  risk    WEIGHT BEARING RESTRICTION  L Lower Extremity: Weight Bearing as Tolerated      AM-PAC ‘6-Clicks’ Inpatient Daily Activity Short Form  -   Putting on and taking off regular lower body clothing?: A Little (supervision)  -   Bathing (including washing, rinsing, drying)?: A Little (supervision)  -   Toileting, which includes using toilet, bedpan or urinal? : A Little (supervision)  -   Putting on and taking off regular upper body clothing?: None  -   Taking care of personal grooming such as brushing teeth?: None  -   Eating meals?: None    AM-PAC Score:  Score: 21  Approx Degree of Impairment: 32.79%  Standardized Score (AM-PAC Scale): 44.27    ADDITIONAL TESTS     NEUROLOGICAL FINDINGS      PLAN   Patient has been evaluated and presents with no skilled Occupational Therapy needs at this time.  Patient discharged from Occupational Therapy services.  Please re-order if a new functional limitation presents during this admission.    OT Device Recommendations: None    Patient Evaluation Complexity Level:   Occupational Profile/Medical History LOW - Brief history including review of medical or therapy records    Specific performance deficits impacting engagement in ADL/IADL LOW  1 - 3 performance deficits    Client Assessment/Performance Deficits LOW - No comorbidities nor modifications of tasks    Clinical Decision Making LOW - Analysis of occupational profile, problem-focused assessments, limited treatment options    Overall Complexity LOW     OT Session Time: 23 minutes  Self-Care Home Management: 10 minutes

## 2024-12-11 NOTE — PLAN OF CARE
Patient alert and oriented x4. VSS on RA. Denies n/t. Aquacel dressing to left hp, CDI. Spanda  in place, gel ice at bedside. SCDs in place, ankle pumps encouraged, IS encouraged. Pt rolling side to side. DTV by 0100. Denies n/v.     Plan: PT/OT eval

## 2024-12-11 NOTE — PLAN OF CARE
A&Ox4. VSS on RA. Severe pain managed with PO medication. Reports numbness to bilateral lower extremities. Aquacel to left hip C/D/I. Spandagrip and gel ice in place. DTV 0100. SCDs on. Call light in reach. PT/OT to see.

## 2024-12-12 ENCOUNTER — TELEPHONE (OUTPATIENT)
Dept: PHYSICAL THERAPY | Facility: HOSPITAL | Age: 59
End: 2024-12-12

## 2024-12-13 ENCOUNTER — OFFICE VISIT (OUTPATIENT)
Dept: PHYSICAL THERAPY | Age: 59
End: 2024-12-13
Attending: ORTHOPAEDIC SURGERY
Payer: COMMERCIAL

## 2024-12-13 DIAGNOSIS — Z96.642 STATUS POST LEFT HIP REPLACEMENT: Primary | ICD-10-CM

## 2024-12-13 PROCEDURE — 97161 PT EVAL LOW COMPLEX 20 MIN: CPT

## 2024-12-13 PROCEDURE — 97110 THERAPEUTIC EXERCISES: CPT

## 2024-12-13 NOTE — PROGRESS NOTES
POST-OP HIP EVALUATION:     Diagnosis:   Left anterior total hip arthroplasty         Referring Provider: Ruddy Sanchez  Date of Evaluation:    12/13/2024    Precautions:   anterior approach   R Hip LILLIAM  Next MD visit:    Next week   Date of Surgery: 12/10/24     PATIENT SUMMARY   Fozia Ivan is a 59 year old female who presents to therapy today s/p L LILLIAM with anterior approach on 12/10/24.  Pt did stay over night. Home 12/11/24 with SPC.  Is using her RW at night if she gets up and on the first floor as needed.     Hip/WB Precautions:  anterior .  Pt describes pain level current 2/10, at best 2/10, at worst 6/10. Taking her pain meds as prescribed     Current functional limitations include  using assistive device for home for ambulation, step up and down approach for stairs.     Fozia describes prior level of function I. Pt goals include  get the ROM and strength back in the L hip .  Past medical history was reviewed with Fozia. Significant findings include   Past Medical History:    ADHD (attention deficit hyperactivity disorder)    Arthritis    Back problem    Genital warts    Hearing loss    High cholesterol    Visual impairment    contact lenses        ASSESSMENT  Fozia presents to physical therapy evaluation with primary c/o post op L hip replacement and post surgical pain. The results of the objective tests and measures show loss of L hip mobility and gait .  Functional deficits include but are not limited to requires assistive device to ambulate today.  Signs and symptoms are consistent with diagnosis of post op L LILLIAM. Pt and PT discussed evaluation findings, pathology, POC and HEP.  Pt voiced understanding and performs HEP correctly without reported pain. Skilled Physical Therapy is medically necessary to address the above impairments and reach functional goals.     OBJECTIVE:   Observation/Skin:  dressing intact on the L side.   Palpation:  R psoas tight today and she is feeling tightness on this  side   Sensation:  denies any numbness or tingling L hip     AROM: (* denotes performed with pain)  Hip   Flexion: R 120; L NA  Extension: R WF:; L NA  Abduction: R 45; L NA  ER: R WFL; L NA  IR: R WFL; L NA     PROM: (* denotes performed with pain)  Hip   Flexion: R  120; L 90  Extension: R WFL ; L NA  Abduction: R 45; L 20   ER: R WFL; L NA  IR: R WFL; L NA     Accessory motion: NA    Flexibility:   Hamstrings: R min; L min  Piriformis: R min; L NA  Quads: R min; L min  Hip Flexor: R mod, L min    Strength/MMT: (* denotes performed with pain)  Hip Knee   R 5/5 R 5/5      Balance: SLS: R 15 sec, L NA sec    Functional Mobility:  5x sit<>stand: NA  TUG (AD, time): NA sec    Gait: pt ambulates on level ground with  SPC , at times no device in the gym today . Advised to use for quality of walking   Today’s Treatment and Response:   PROM L hip today. Ady stretch for the R side today and discussed roller for the R ITB and anterior hip area where she is feeling tightness.  Gait training today in the parallel bars and with her SPC outside the bars.   Pt education was provided on exam findings, treatment diagnosis, treatment plan, expectations, and prognosis. Pt was also provided recommendations for  sleeping, precautions and ice at home .  Patient was instructed in and issued a HEP for:  heel slides for the L with belt to assist  Quad sets in hooklying. Add with ball in hooklying  R ady stretch 2 x 30 secs   Ankle pumps  Standing heel raises at home with HHA   Iced today 10 mins L hip     Charges: PT Eval Low Complexity,  EX 1       Total Timed Treatment: 45 min     Total Treatment Time: 45 min     Based on clinical rationale and outcome measures, this evaluation involved Low Complexity decision making due to 1-2 personal factors/comorbidities, 3 body structures involved/activity limitations, and evolving symptoms including changing pain levels.  PLAN OF CARE:    Goals: (To be met in 12 visits)   Pt will have  improved hip AROM Flex to 120 deg and ABD to 45 deg to be able to don/doff shoes and perform car transfers without difficulty  Pt will improve hip ABD and ER strength to 5/5 to increase ease with standing and walking   Pt will demonstrate improved SLS to >30 seconds [place] to promote safety and decrease risk of falls on uneven surfaces such as grass    Pt will be able to squat to  light objects around the house with <1/10 hip pain   Pt will improve functional hip strength to report ability to ascend/descend 1 flight of stairs reciprocally without use of handrail  Pt will be independent and compliant with comprehensive HEP to maintain progress achieved in PT    Frequency / Duration: Patient will be seen for 2 x/week or a total of 12 visits over a 90 day period.  Treatment will include:  balance and gait training  Increase L hip ROM and strength   Core strength     Education or treatment limitation: None  Rehab Potential:good    FES Score  No data recorded  No data recorded    Patient/Family/Caregiver was advised of these findings, precautions, and treatment options and has agreed to actively participate in planning and for this course of care.    Thank you for your referral. Please co-sign or sign and return this letter via fax as soon as possible to [phone]. If you have any questions, please contact me at Dept: [phone]    Sincerely,  Electronically signed by therapist: Terra Nevarez, PT  Physician's certification required: Yes  I certify the need for these services furnished under this plan of treatment and while under my care.    X___________________________________________________ Date____________________    Certification From: 12/13/2024  To:3/13/2025

## 2024-12-16 ENCOUNTER — OFFICE VISIT (OUTPATIENT)
Dept: PHYSICAL THERAPY | Age: 59
End: 2024-12-16
Attending: ORTHOPAEDIC SURGERY
Payer: COMMERCIAL

## 2024-12-16 PROCEDURE — 97140 MANUAL THERAPY 1/> REGIONS: CPT

## 2024-12-16 PROCEDURE — 97110 THERAPEUTIC EXERCISES: CPT

## 2024-12-16 NOTE — PROGRESS NOTES
Diagnosis:   Left anterior total hip arthroplasty       Referring Provider: Ruddy Sanchez  Date of Evaluation:     12/13/2024     Precautions:   anterior precautions  Next MD visit:   none scheduled  Date of Surgery: 12/10/24    Insurance Primary/Secondary: [employer] / N/A     # Auth Visits: [id number]            Subjective: Pt arrives to the clinic with reports of improvement in her L sided hip discomfort since initial evaluation. Pt has been completing her HEP w/o pain onset or difficulty. No other concerns at this time.     Pain: 3/10      Objective:   Palpation:  bilateral psoas tightness    Sensation:  denies any numbness or tingling L hip      AROM: (* denotes performed with pain)  Hip   Flexion: R 120; L NA  Extension: R WF:; L NA  Abduction: R 45; L NA  ER: R WFL; L NA  IR: R WFL; L NA    PROM: 120 degrees of hip flexion on L        Assessment: Pt responded well to skilled therapy session w/ reports of a slight improvement in her L sided anterior hip tightness following exercise and manual prescription. Pt was reminded importance of activity pacing in order to avoid aggravation. Pt will continue to benefit from skilled therapy to improve LLE strength and functional mobility to return to PLOF with minimal symptoms.       Goals:   Pt will have improved hip AROM Flex to 120 deg and ABD to 45 deg to be able to don/doff shoes and perform car transfers without difficulty  Pt will improve hip ABD and ER strength to 5/5 to increase ease with standing and walking   Pt will demonstrate improved SLS to >30 seconds [place] to promote safety and decrease risk of falls on uneven surfaces such as grass     Pt will be able to squat to  light objects around the house with <1/10 hip pain   Pt will improve functional hip strength to report ability to ascend/descend 1 flight of stairs reciprocally without use of handrail  Pt will be independent and compliant with comprehensive HEP to maintain progress achieved in  PT    Plan: Progress per protocol, introduce recumbent bike next visit   Date: 12/16/2024  TX#: 2/18 Date:                 TX#: 3/ Date:                 TX#: 4/ Date:                 TX#: 5/ Date:   Tx#: 6/   SAQ using bolster 3x12       Seated heel slides 3x10       Hook-lying hip iso hip ABD + ADD 3x10 w 3 sec iso holds        PROM hip flexion,IR, ER(10 degrees)  pain free ROM       Gluteal sets 3x10 in hook-lying       Side lying pain free clamshell 3x10       Hip flexor,quad, glute , ITB rolling + strumming       HEP:  -heel slides for the L with belt to assist  -Quad sets in hooklying. Add with ball in hooklying  -R patricia stretch 2 x 30 secs   -Ankle pumps  -Standing heel raises at home with HHA   -Iced today 10 mins L hip     Charges: There EX(2) 35min MT(1) 10 min       Total Timed Treatment: 45 min  Total Treatment Time: 45 min

## 2024-12-18 ENCOUNTER — OFFICE VISIT (OUTPATIENT)
Dept: PHYSICAL THERAPY | Age: 59
End: 2024-12-18
Attending: ORTHOPAEDIC SURGERY
Payer: COMMERCIAL

## 2024-12-18 PROCEDURE — 97140 MANUAL THERAPY 1/> REGIONS: CPT

## 2024-12-18 PROCEDURE — 97110 THERAPEUTIC EXERCISES: CPT

## 2024-12-19 NOTE — PROGRESS NOTES
Diagnosis:   Left anterior total hip arthroplasty       Referring Provider: Ruddy Sanchez  Date of Evaluation:     12/13/2024     Precautions:   anterior precautions  Next MD visit:   none scheduled  Date of Surgery: 12/10/24    Insurance Primary/Secondary: [employer] / N/A     # Auth Visits: [id number]            Subjective: Pt arrives to the clinic with reports of improvement in her L sided hip discomfort since initial evaluation. Pt has been active and walking which has helped decrease her hip stiffness but states she is experiencing some posterior hip pain in the area of her glutes on the L. No other concerns at this time.     Pain: 3/10 in the area of her glutes which is bruised following surgery      Objective:   Palpation:  bilateral psoas tightness    Sensation:  denies any numbness or tingling L hip      AROM: (* denotes performed with pain)  Hip   Flexion: R 120; L NA  Extension: R WF:; L NA  Abduction: R 45; L NA  ER: R WFL; L NA  IR: R WFL; L NA    PROM: 120 degrees of hip flexion on L        Assessment: Pt responded well to skilled therapy session w/ reports of a slight improvement in her L sided anterior hip tightness following exercise and manual prescription. Pt was reminded importance of activity pacing in order to avoid aggravation. Pt will continue to benefit from skilled therapy to improve LLE strength and functional mobility to return to PLOF with minimal symptoms.       Goals:   Pt will have improved hip AROM Flex to 120 deg and ABD to 45 deg to be able to don/doff shoes and perform car transfers without difficulty  Pt will improve hip ABD and ER strength to 5/5 to increase ease with standing and walking   Pt will demonstrate improved SLS to >30 seconds [place] to promote safety and decrease risk of falls on uneven surfaces such as grass     Pt will be able to squat to  light objects around the house with <1/10 hip pain   Pt will improve functional hip strength to report ability to  ascend/descend 1 flight of stairs reciprocally without use of handrail  Pt will be independent and compliant with comprehensive HEP to maintain progress achieved in PT    Plan: Progress per protocol, progress CKC glute strengthening  Date: 12/16/2024  TX#: 2/18 Date:12/18/2024               TX#: 3/ Date:                 TX#: 4/ Date:                 TX#: 5/ Date:   Tx#: 6/   SAQ using bolster 3x12 NuStep x8 min load 3      Seated heel slides 3x10 DF/PF on slant board + gastroc stretch 3x45 sec holds      Hook-lying hip iso hip ABD + ADD 3x10 w 3 sec iso holds  Gluteal sets 3x10 in hook-lying      PROM hip flexion,IR, ER(10 degrees)  pain free ROM Hook-lying hip iso hip ABD + ADD 3x10 w 3 sec iso holds       Gluteal sets 3x10 in hook-lying PROM hip flexion,IR, ER(10 degrees)  pain free ROM      Side lying pain free clamshell 3x10 Side lying pain free clamshell ISO 3x10 w 3 sec holds      Hip flexor,quad, glute , ITB rolling + strumming Hip flexor,quad, glute , ITB rolling + strumming      HEP:  -heel slides for the L with belt to assist  -Quad sets in hooklying. Add with ball in hooklying  -R patricia stretch 2 x 30 secs   -Ankle pumps  -Standing heel raises at home with HHA   -Iced today 10 mins L hip     Charges: There EX(2) 35min MT(1) 10 min       Total Timed Treatment: 45 min  Total Treatment Time: 45 min

## 2024-12-20 ENCOUNTER — OFFICE VISIT (OUTPATIENT)
Dept: PHYSICAL THERAPY | Age: 59
End: 2024-12-20
Attending: ORTHOPAEDIC SURGERY
Payer: COMMERCIAL

## 2024-12-20 PROCEDURE — 97140 MANUAL THERAPY 1/> REGIONS: CPT

## 2024-12-20 PROCEDURE — 97110 THERAPEUTIC EXERCISES: CPT

## 2024-12-20 NOTE — PROGRESS NOTES
Diagnosis:   Left anterior total hip arthroplasty       Referring Provider: Ruddy Sanchez  Date of Evaluation:     12/13/2024     Precautions:   anterior precautions  Next MD visit:   none scheduled  Date of Surgery: 12/10/24    Insurance Primary/Secondary: [employer] / N/A     # Auth Visits: [id number]            Subjective: Pt arrives to the clinic with reports of improvement in her L sided hip discomfort since initial evaluation. Pt has been active and walking which has helped decrease her hip stiffness but states she is experiencing some posterior hip pain in the area of her glutes on the L. Pt states she is fatigued today after walking around [employer] before her session today. No other concerns at this time.     Pain: 3/10 in the area of her glutes which is bruised following surgery      Objective:   Palpation:  bilateral psoas tightness    Sensation:  denies any numbness or tingling L hip      AROM: (* denotes performed with pain)  Hip   Flexion: R 120; L NA  Extension: R WF:; L NA  Abduction: R 45; L NA  ER: R WFL; L NA  IR: R WFL; L NA    PROM: 120 degrees of hip flexion on L        Assessment: Pt responded well to skilled therapy session w/ reports of a slight improvement in her L sided anterior hip tightness following exercise and manual prescription. Pt was reminded importance of activity pacing in order to avoid aggravation. Pt was progressed in CKC exercises that focused on glute strength and SLS. Pt will continue to benefit from skilled therapy to improve LLE strength and functional mobility to return to PLOF with minimal symptoms.       Goals:   Pt will have improved hip AROM Flex to 120 deg and ABD to 45 deg to be able to don/doff shoes and perform car transfers without difficulty  Pt will improve hip ABD and ER strength to 5/5 to increase ease with standing and walking   Pt will demonstrate improved SLS to >30 seconds [place] to promote safety and decrease risk of falls on uneven surfaces such as  grass     Pt will be able to squat to  light objects around the house with <1/10 hip pain   Pt will improve functional hip strength to report ability to ascend/descend 1 flight of stairs reciprocally without use of handrail  Pt will be independent and compliant with comprehensive HEP to maintain progress achieved in PT    Plan: Progress per protocol, progress CKC glute strengthening  Date: 12/16/2024  TX#: 2/18 Date:12/18/2024               TX#: 3/ Date:12/20/2024                 TX#: 4/ Date:                 TX#: 5/ Date:   Tx#: 6/   SAQ using bolster 3x12 NuStep x8 min load 3 NuStep x8 min load 3     Seated heel slides 3x10 DF/PF on slant board + gastroc stretch 3x45 sec holds End range hip flexion on large box 3x10 BUE assist in // bars     Hook-lying hip iso hip ABD + ADD 3x10 w 3 sec iso holds  Gluteal sets 3x10 in hook-lying Active hip flexion stretch pain free ROM 2x10 per leg     PROM hip flexion,IR, ER(10 degrees)  pain free ROM Hook-lying hip iso hip ABD + ADD 3x10 w 3 sec iso holds  DF/PF on slant board + gastroc stretch 3x45 sec holds     Gluteal sets 3x10 in hook-lying PROM hip flexion,IR, ER(10 degrees)  pain free ROM Lateral band walks using YTB an knee 4 laps      Side lying pain free clamshell 3x10 Side lying pain free clamshell ISO 3x10 w 3 sec holds LTR on yellow exercise ball x30     Hip flexor,quad, glute , ITB rolling + strumming Hip flexor,quad, glute , ITB rolling + strumming [place] on exercise ball 3x10 pain free ROM        Hip flexor,quad, glute , ITB rolling + strumming, Hamstring stretch in supine pain free ROM     HEP:  -heel slides for the L with belt to assist  -Quad sets in hooklying. Add with ball in hooklying  -R patricia stretch 2 x 30 secs   -Ankle pumps  -Standing heel raises at home with HHA   -Iced today 10 mins L hip     Charges: There EX(2) 35min MT(1) 10 min       Total Timed Treatment: 45 min  Total Treatment Time: 45 min

## 2024-12-23 ENCOUNTER — OFFICE VISIT (OUTPATIENT)
Dept: PHYSICAL THERAPY | Age: 59
End: 2024-12-23
Attending: ORTHOPAEDIC SURGERY
Payer: COMMERCIAL

## 2024-12-23 PROCEDURE — 97140 MANUAL THERAPY 1/> REGIONS: CPT

## 2024-12-23 PROCEDURE — 97110 THERAPEUTIC EXERCISES: CPT

## 2024-12-23 NOTE — PROGRESS NOTES
Diagnosis:   Left anterior total hip arthroplasty       Referring Provider: Ruddy Sanchez  Date of Evaluation:     12/13/2024     Precautions:   anterior precautions  Next MD visit:   none scheduled  Date of Surgery: 12/10/24    Insurance Primary/Secondary: [employer] / N/A     # Auth Visits: [id number]            Subjective: Pt arrives to the clinic with reports of improvement in her L sided hip discomfort since initial evaluation. Pt has been active and walking which has helped decrease her hip stiffness but states she is experiencing some posterior hip pain in the area of her glutes on the L as well as some tightness of her hip flexors bilaterally. Pt states her soreness could be due to prolonged periods of sitting at work. No other concerns at this time.     Pain: 3/10 in the area of her glutes which is bruised following surgery      Objective:   Palpation:  bilateral psoas tightness    Sensation:  denies any numbness or tingling L hip      AROM: (* denotes performed with pain)  Hip   Flexion: R 120; L NA  Extension: R WF:; L NA  Abduction: R 45; L NA  ER: R WFL; L NA  IR: R WFL; L NA    PROM: 120 degrees of hip flexion on L        Assessment: Pt responded well to skilled therapy session w/ reports of an improvement in her L sided anterior and posterior hip tightness following exercise and manual prescription. Pt was reminded importance of activity pacing in order to avoid aggravation. Pt was progressed in CK exercises that focused on glute strength and hip mobility. Pt will continue to benefit from skilled therapy to improve LLE strength and functional mobility to return to PLOF with minimal symptoms.       Goals:   Pt will have improved hip AROM Flex to 120 deg and ABD to 45 deg to be able to don/doff shoes and perform car transfers without difficulty  Pt will improve hip ABD and ER strength to 5/5 to increase ease with standing and walking   Pt will demonstrate improved SLS to >30 seconds CHINA to promote  safety and decrease risk of falls on uneven surfaces such as grass     Pt will be able to squat to  light objects around the house with <1/10 hip pain   Pt will improve functional hip strength to report ability to ascend/descend 1 flight of stairs reciprocally without use of handrail  Pt will be independent and compliant with comprehensive HEP to maintain progress achieved in PT    Plan: Progress per protocol, progress CKC glute strengthening  Date: 12/16/2024  TX#: 2/18 Date:12/18/2024               TX#: 3/ Date:12/20/2024                 TX#: 4/ Date:12/23/2024                 TX#: 5/ Date:   Tx#: 6/   SAQ using bolster 3x12 NuStep x8 min load 3 NuStep x8 min load 3 NuStep x8 min load 3    Seated heel slides 3x10 DF/PF on slant board + gastroc stretch 3x45 sec holds End range hip flexion on large box 3x10 BUE assist in // bars End range hip flexion on large box 3x10 BUE assist in // bars    Hook-lying hip iso hip ABD + ADD 3x10 w 3 sec iso holds  Gluteal sets 3x10 in hook-lying Active hip flexion stretch pain free ROM 2x10 per leg Monster walks 3-4 laps in // bars using YTB at knees    PROM hip flexion,IR, ER(10 degrees)  pain free ROM Hook-lying hip iso hip ABD + ADD 3x10 w 3 sec iso holds  DF/PF on slant board + gastroc stretch 3x45 sec holds Lateral band walks using YTB 3 laps in // bars    Gluteal sets 3x10 in hook-lying PROM hip flexion,IR, ER(10 degrees)  pain free ROM Lateral band walks using YTB an knee 4 laps  Bridge on exercise ball 3x10 pain free ROM     Side lying pain free clamshell 3x10 Side lying pain free clamshell ISO 3x10 w 3 sec holds LTR on yellow exercise ball x30 DF/PF on slant board + gastroc stretch 3x45 sec holds    Hip flexor,quad, glute , ITB rolling + strumming Hip flexor,quad, glute , ITB rolling + strumming Bridge on exercise ball 3x10 pain free ROM  Sustained pressure glute musculature       Hip flexor,quad, glute , ITB rolling + strumming, Hamstring stretch in supine pain  free ROM Hip flexor,quad, glute , ITB rolling + strumming, Hamstring stretch in supine pain free ROM    HEP:  -heel slides for the L with belt to assist  -Quad sets in hooklying. Add with ball in hooklying  -R patricia stretch 2 x 30 secs   -Ankle pumps  -Standing heel raises at home with HHA   -Iced today 10 mins L hip     Charges: There EX(2) 35min MT(1) 10 min       Total Timed Treatment: 45 min  Total Treatment Time: 45 min

## 2024-12-24 ENCOUNTER — OFFICE VISIT (OUTPATIENT)
Facility: CLINIC | Age: 59
End: 2024-12-24
Payer: COMMERCIAL

## 2024-12-24 DIAGNOSIS — M16.0 PRIMARY OSTEOARTHRITIS OF BOTH HIPS: ICD-10-CM

## 2024-12-24 DIAGNOSIS — Z96.642 STATUS POST LEFT HIP REPLACEMENT: Primary | ICD-10-CM

## 2024-12-24 PROCEDURE — 99024 POSTOP FOLLOW-UP VISIT: CPT | Performed by: PHYSICIAN ASSISTANT

## 2024-12-24 RX ORDER — TRAMADOL HYDROCHLORIDE 50 MG/1
50 TABLET ORAL EVERY 8 HOURS PRN
Qty: 60 TABLET | Refills: 0 | Status: SHIPPED | OUTPATIENT
Start: 2024-12-24 | End: 2024-12-30

## 2024-12-24 NOTE — PROGRESS NOTES
[facility] Progress Note    Subjective: Patient returns to clinic 2 weeks status post left anterior total hip arthroplasty performed on 12/10/24.  They have been taking Tylenol, Celebrex, Tramadol for pain control.  They continue to take aspirin for DVT prophylaxis. They are overall satisfied with their progress.  Denies any fevers, chills, night sweats.  No redness or drainage from the incision concerning for infection.    Objective: Patient is seated comfortably in the exam chair. Alert and oriented. Nonlabored breathing. Grossly neurologically intact. Incision is clean, dry, and intact with steri-strips in place without any redness or drainage concerning for infection. Calves are soft and nontender.    Assessment/Plan:  Patient is doing well now 2 weeks s/p left anterior total hip arthoplasty. They may get their incision wet in the shower at this point in time, avoiding soaking or submerging the incision in a pool or tub until 6 weeks postop. Continue with aspirin for DVT prophylaxis. Outpatient physical therapy referral written.  Follow-up in 4 weeks with Dr. Sanchez for routine 6-week postoperative visit or sooner if any concerns.  The patient's questions were sought and answered satisfactorily.  They are happy with the plan and will follow as advised.    X-rays needed at next visit: Postoperative radiographs left hip        Lion Huerta PA-C  [facility]    This note was dictated using Dragon software.  While it was briefly proofread prior to completion, some grammatical, spelling, and word choice errors due to dictation may still occur.

## 2024-12-27 ENCOUNTER — OFFICE VISIT (OUTPATIENT)
Dept: PHYSICAL THERAPY | Age: 59
End: 2024-12-27
Attending: ORTHOPAEDIC SURGERY
Payer: COMMERCIAL

## 2024-12-27 PROCEDURE — 97110 THERAPEUTIC EXERCISES: CPT

## 2024-12-27 PROCEDURE — 97140 MANUAL THERAPY 1/> REGIONS: CPT

## 2024-12-27 NOTE — PROGRESS NOTES
Diagnosis:   Left anterior total hip arthroplasty       Referring Provider: Ruddy Sanchez  Date of Evaluation:     12/13/2024     Precautions:   anterior precautions  Next MD visit:   none scheduled  Date of Surgery: 12/10/24    Insurance Primary/Secondary: [employer] / N/A     # Auth Visits: [id number]            Subjective: Pt arrives to the clinic with reports of improvement in her L sided hip discomfort since initial evaluation. Pt has been active and walking which has helped decrease her hip stiffness. Pt does report some \"burning and tingling\" in her L anterior and medial thigh.    Pain: 4/10 in the area of her glutes and anterior thigh.      Objective:   Palpation:  bilateral psoas tightness    Sensation:  denies any numbness or tingling L hip      AROM: (* denotes performed with pain)  Hip   Flexion: R 120; L NA  Extension: R WF:; L NA  Abduction: R 45; L NA  ER: R WFL; L NA  IR: R WFL; L NA    PROM: 120 degrees of hip flexion on L        Assessment: Pt responded well to skilled therapy session w/ reports of an improvement in her L sided anterior and posterior hip tightness following exercise and manual prescription. Pt was reminded importance of activity pacing in order to avoid aggravation. Pt was progressed in CKC exercises that focused on glute strength and hip mobility. Pt will continue to benefit from skilled therapy to improve LLE strength and functional mobility to return to PLOF with minimal symptoms. HEP will be updated for continued maintenance and progression on her next session.        Goals:   Pt will have improved hip AROM Flex to 120 deg and ABD to 45 deg to be able to don/doff shoes and perform car transfers without difficulty  Pt will improve hip ABD and ER strength to 5/5 to increase ease with standing and walking   Pt will demonstrate improved SLS to >30 seconds CHINA to promote safety and decrease risk of falls on uneven surfaces such as grass     Pt will be able to squat to   light objects around the house with <1/10 hip pain   Pt will improve functional hip strength to report ability to ascend/descend 1 flight of stairs reciprocally without use of handrail  Pt will be independent and compliant with comprehensive HEP to maintain progress achieved in PT    Plan: Progress per protocol, progress CKC glute strengthening  Date: 12/16/2024  TX#: 2/18 Date:12/18/2024               TX#: 3/ Date:12/20/2024                 TX#: 4/ Date:12/23/2024                 TX#: 5/ Date:12/27/2024   Tx#: 6/   SAQ using bolster 3x12 NuStep x8 min load 3 NuStep x8 min load 3 NuStep x8 min load 3 NuStep x8 min load 3   Seated heel slides 3x10 DF/PF on slant board + gastroc stretch 3x45 sec holds End range hip flexion on large box 3x10 BUE assist in // bars End range hip flexion on large box 3x10 BUE assist in // bars End range hip flexion on large box 3x10 BUE assist in // bars   Hook-lying hip iso hip ABD + ADD 3x10 w 3 sec iso holds  Gluteal sets 3x10 in hook-lying Active hip flexion stretch pain free ROM 2x10 per leg Monster walks 3-4 laps in // bars using YTB at knees Monster walks 3-4 laps in // bars using YTB at knees   PROM hip flexion,IR, ER(10 degrees)  pain free ROM Hook-lying hip iso hip ABD + ADD 3x10 w 3 sec iso holds  DF/PF on slant board + gastroc stretch 3x45 sec holds Lateral band walks using YTB 3 laps in // bars Lateral band walks using YTB 3 laps in // bars   Gluteal sets 3x10 in hook-lying PROM hip flexion,IR, ER(10 degrees)  pain free ROM Lateral band walks using YTB an knee 4 laps  Bridge on exercise ball 3x10 pain free ROM  Bridge on exercise ball 3x10 pain free ROM    Side lying pain free clamshell 3x10 Side lying pain free clamshell ISO 3x10 w 3 sec holds LTR on yellow exercise ball x30 DF/PF on slant board + gastroc stretch 3x45 sec holds DF/PF on slant board + gastroc stretch 3x45 sec holds   Hip flexor,quad, glute , ITB rolling + strumming Hip flexor,quad, glute , ITB rolling +  strumming Bridge on exercise ball 3x10 pain free ROM  Sustained pressure glute musculature  SLS on foam hip ABD+ EXT 2x10 per leg      Hip flexor,quad, glute , ITB rolling + strumming, Hamstring stretch in supine pain free ROM Hip flexor,quad, glute , ITB rolling + strumming, Hamstring stretch in supine pain free ROM Hip flexor,quad, glute , ITB rolling + strumming, Hamstring stretch in supine pain free ROM   HEP:  -heel slides for the L with belt to assist  -Quad sets in hooklying. Add with ball in hooklying  -R patricia stretch 2 x 30 secs   -Ankle pumps  -Standing heel raises at home with HHA   -Iced today 10 mins L hip     Charges: There EX(2) 35min MT(1) 10 min       Total Timed Treatment: 45 min  Total Treatment Time: 45 min

## 2024-12-27 NOTE — PROGRESS NOTES
Fozia Ivan is a 59 year old female who presents for a complete physical exam:       Patient complains of:     Dyslipidemia. Atorvastatin 10mg    discussed goal ultimately closer to 100  she continues to exercise regularly.      Recent THR.  Doing well.  Dr Sanchez.      Wt Readings from Last 6 Encounters:   12/30/24 140 lb (63.5 kg)   12/10/24 139 lb 1.8 oz (63.1 kg)   11/26/24 142 lb (64.4 kg)   11/07/24 139 lb (63 kg)   09/25/24 136 lb (61.7 kg)   09/12/24 139 lb 8.8 oz (63.3 kg)       Cholesterol, Total (mg/dL)   Date Value   11/22/2024 197   12/08/2023 212 (H)   11/04/2022 181     HDL Cholesterol (mg/dL)   Date Value   11/22/2024 68 (H)   12/08/2023 64 (H)   11/04/2022 65 (H)     LDL Cholesterol (mg/dL)   Date Value   11/22/2024 111 (H)   12/08/2023 127 (H)   11/04/2022 92     AST (SGOT) (IU/L)   Date Value   10/03/2006 17     AST (U/L)   Date Value   11/22/2024 20   08/21/2024 14   12/08/2023 15     ALT (SGPT) (IU/L)   Date Value   10/03/2006 11     ALT (U/L)   Date Value   11/22/2024 17   08/21/2024 12   12/08/2023 22        Current Outpatient Medications   Medication Sig Dispense Refill    atorvastatin 10 MG Oral Tab Take 1 tablet (10 mg total) by mouth daily. 90 tablet 3      Past Medical History:    ADHD (attention deficit hyperactivity disorder)    Arthritis    Back problem    Genital warts    Hearing loss    High cholesterol    Visual impairment    contact lenses      Past Surgical History:   Procedure Laterality Date    Hip replacement surgery Left 12/10/2024        Laser surgery of cervix      genital warts removal    Thompson localization wire 1 site right (cpt=19281)      CYST     Other surgical history  age 7    urethra surgery    Total hip replacement Right 09/2024    Tubal ligation  2005      Family History   Problem Relation Age of Onset    Heart Disorder Father         stent placed    Lipids Mother     Colon Polyps Mother     Cancer Maternal Grandmother         lymphoma     Heart Attack Maternal Grandfather     Heart Attack Paternal Grandfather            Health Maintenance  Immunizations:   Immunization History   Administered Date(s) Administered    >=9 YRS AFLURIA TRI PRESERV FREE SINGLE DOSE (67894) FLU [facility] 11/16/2015    Covid-19 Vaccine Pfizer 30 mcg/0.3 ml 03/19/2021, 04/13/2021    FLUZONE 6 months and older PFS 0.5 ml (23389) 10/09/2014    HEP B, Adult 04/05/2023, 05/16/2023, 10/04/2023    Influenza 01/25/2013    Influenza Vaccine, trivalent (IIV3), PF 0.5mL (90495) 01/13/2014    TDAP 01/25/2013, 12/14/2023    Zoster Vaccine Recombinant Adjuvanted (Shingrix) 11/30/2021, 02/17/2022     Dental Visits: yes   Colon cancer screening:    Health Maintenance   Topic Date Due    Colorectal Cancer Screening  02/11/2032      Gyne:     Health Maintenance   Topic Date Due    Pap Smear  11/30/2026    Dr Huddleston in Feb        History of dysplasia:  No  Menstrual Cycle: spotting last in August.  Has IUD    Breast Cancer Screening:    Health Maintenance   Topic Date Due    Mammogram  02/16/2025        Bone Density:  2022 normal.      Osteoporosis Prevention:    Osteoporosis Prevention was discussed.  Reviewed Calcium, Vitamin D supplementation and Weight Bearing Exercises.    Patient is performing weight bearing exercises.  Patient is currently taking Vitamin D supplementation.        REVIEW OF SYSTEMS:   GENERAL: feels well otherwise  SKIN: denies any unusual skin lesions  EYES:denies blurred vision or double vision  HEENT: denies nasal congestion, sinus pain or ST  LUNGS: denies shortness of breath with exertion  CARDIOVASCULAR: denies chest pain on exertion  GI: denies abdominal pain,denies heartburn  : denies dysuria, vaginal discharge or itching  MUSCULOSKELETAL: denies back pain  NEURO: denies headaches  PSYCH: no c/o      EXAM:   /64 (BP Location: Left arm, Patient Position: Sitting, Cuff Size: adult)   Pulse 78   Ht 5' 3.7\" (1.618 m)   Wt 140 lb (63.5 kg)   LMP 08/08/2024    SpO2 98%   BMI 24.26 kg/m²   Body mass index is 24.26 kg/m².   GENERAL: well developed, well nourished,in no apparent distress  SKIN: no rashes,no suspicious lesions  HEENT: atraumatic, normocephalic,ears and throat are clear  EYES:PERRLA, EOMI, conjunctiva are clear  NECK: supple,no adenopathy,  CHEST: no chest tenderness  BREAST: no dominant or suspicious mass  LUNGS: clear to auscultation  CARDIO: RRR without murmur  GI: good BS's,no masses, HSM or tenderness  MUSCULOSKELETAL: back is not tender,  EXTREMITIES: no edema  NEURO: Oriented times three,cranial nerves are intact,motor and sensory are grossly intact    ASSESSMENT AND PLAN:      HEALTH MAINTENANCE:  labs reviewed.  Mammogram in Feb as well as Dr Huddleston.      Encounter Diagnoses   Name Primary?    Routine general medical examination at a health care facility Yes    Degeneration of intervertebral disc of lumbar region with discogenic back pain  stable.  Monitor.      Diverticulosis  stable  monitor.      Dyslipidemia  atorvastatin.  Continue exercise.       History of total hip replacement, right doing well.       Lumbar facet arthropathy  stable       Encounter for screening mammogram for malignant neoplasm of breast        No orders of the defined types were placed in this encounter.      Meds & Refills for this Visit:  Requested Prescriptions      No prescriptions requested or ordered in this encounter       Imaging & Consults:  [facility] SAGAR 2D+3D SCREENING BILAT (CPT=77067/25082)    No follow-ups on file.  There are no Patient Instructions on file for this visit.

## 2024-12-30 ENCOUNTER — OFFICE VISIT (OUTPATIENT)
Dept: INTERNAL MEDICINE CLINIC | Facility: CLINIC | Age: 59
End: 2024-12-30
Payer: COMMERCIAL

## 2024-12-30 ENCOUNTER — OFFICE VISIT (OUTPATIENT)
Dept: PHYSICAL THERAPY | Age: 59
End: 2024-12-30
Attending: ORTHOPAEDIC SURGERY
Payer: COMMERCIAL

## 2024-12-30 VITALS
HEIGHT: 63.7 IN | SYSTOLIC BLOOD PRESSURE: 110 MMHG | HEART RATE: 78 BPM | OXYGEN SATURATION: 98 % | DIASTOLIC BLOOD PRESSURE: 64 MMHG | BODY MASS INDEX: 24.2 KG/M2 | WEIGHT: 140 LBS

## 2024-12-30 DIAGNOSIS — Z00.00 ROUTINE GENERAL MEDICAL EXAMINATION AT A HEALTH CARE FACILITY: Primary | ICD-10-CM

## 2024-12-30 DIAGNOSIS — M47.816 LUMBAR FACET ARTHROPATHY: ICD-10-CM

## 2024-12-30 DIAGNOSIS — Z96.643 HISTORY OF BILATERAL HIP REPLACEMENTS: ICD-10-CM

## 2024-12-30 DIAGNOSIS — Z12.31 ENCOUNTER FOR SCREENING MAMMOGRAM FOR MALIGNANT NEOPLASM OF BREAST: ICD-10-CM

## 2024-12-30 DIAGNOSIS — M51.360 DEGENERATION OF INTERVERTEBRAL DISC OF LUMBAR REGION WITH DISCOGENIC BACK PAIN: ICD-10-CM

## 2024-12-30 DIAGNOSIS — K57.30 DIVERTICULOSIS, SIGMOID: ICD-10-CM

## 2024-12-30 DIAGNOSIS — E78.5 DYSLIPIDEMIA: ICD-10-CM

## 2024-12-30 DIAGNOSIS — M16.12 PRIMARY OSTEOARTHRITIS OF LEFT HIP: ICD-10-CM

## 2024-12-30 PROCEDURE — 97140 MANUAL THERAPY 1/> REGIONS: CPT

## 2024-12-30 PROCEDURE — 99396 PREV VISIT EST AGE 40-64: CPT | Performed by: NURSE PRACTITIONER

## 2024-12-30 PROCEDURE — 97110 THERAPEUTIC EXERCISES: CPT

## 2024-12-30 RX ORDER — ASPIRIN 81 MG/1
81 TABLET ORAL 2 TIMES DAILY
COMMUNITY
Start: 2024-12-30

## 2024-12-30 RX ORDER — ATORVASTATIN CALCIUM 10 MG/1
10 TABLET, FILM COATED ORAL DAILY
Qty: 90 TABLET | Refills: 3 | Status: SHIPPED | OUTPATIENT
Start: 2024-12-30

## 2024-12-30 NOTE — PROGRESS NOTES
Diagnosis:   Left anterior total hip arthroplasty       Referring Provider: Ruddy Sanchez  Date of Evaluation:     12/13/2024     Precautions:   anterior precautions  Next MD visit:   none scheduled  Date of Surgery: 12/10/24    Insurance Primary/Secondary: [employer] / N/A     # Auth Visits: 18            Subjective: Pt arrives to the clinic with reports of improvement in her L sided hip discomfort since initial evaluation. Pt has been active and walking which has helped decrease her hip stiffness. No other concerns at this time.    Pain: 2/10 in the area of her glutes and anterior thigh.      Objective:   Palpation:  bilateral psoas tightness    Sensation:  denies any numbness or tingling L hip      AROM: (* denotes performed with pain)  Hip   Flexion: R 120; L NA  Extension: R WF:; L NA  Abduction: R 45; L NA  ER: R WFL; L NA  IR: R WFL; L NA    PROM: 120 degrees of hip flexion on L        Assessment: Pt responded well to skilled therapy session w/ reports of an improvement in her L sided anterior and posterior hip tightness following exercise and manual prescription. Pt was reminded importance of activity pacing in order to avoid aggravation. Pt was progressed in CKC exercises that focused on glute strength and hip mobility. HEP was updated and reviewed for continued maintenance and progression.        Goals:   Pt will have improved hip AROM Flex to 120 deg and ABD to 45 deg to be able to don/doff shoes and perform car transfers without difficulty. MET  Pt will improve hip ABD and ER strength to 5/5 to increase ease with standing and walking   Pt will demonstrate improved SLS to >30 seconds [place] to promote safety and decrease risk of falls on uneven surfaces such as grass. MET  Pt will be able to squat to  light objects around the house with <1/10 hip pain. MET  Pt will improve functional hip strength to report ability to ascend/descend 1 flight of stairs reciprocally without use of  handrail.MET  Pt will be independent and compliant with comprehensive HEP to maintain progress achieved in PT. MET    Plan: D/c from therapy with HEP  Date:12/18/2024               TX#: 3/ Date:12/20/2024                 TX#: 4/ Date:12/23/2024                 TX#: 5/ Date:12/27/2024   Tx#: 6/ Date:12/30/2024   Tx#: 6/6   NuStep x8 min load 3 NuStep x8 min load 3 NuStep x8 min load 3 NuStep x8 min load 3 NuStep x8 min load 3   DF/PF on slant board + gastroc stretch 3x45 sec holds End range hip flexion on large box 3x10 BUE assist in // bars End range hip flexion on large box 3x10 BUE assist in // bars End range hip flexion on large box 3x10 BUE assist in // bars DF/PF on slant board + gastroc stretch 3x45 sec holds   Gluteal sets 3x10 in hook-lying Active hip flexion stretch pain free ROM 2x10 per leg Monster walks 3-4 laps in // bars using YTB at knees Monster walks 3-4 laps in // bars using YTB at knees Shuttle squats lvl 4 3x10   Hook-lying hip iso hip ABD + ADD 3x10 w 3 sec iso holds  DF/PF on slant board + gastroc stretch 3x45 sec holds Lateral band walks using YTB 3 laps in // bars Lateral band walks using YTB 3 laps in // bars Lateral band walks using YTB 3 laps in // bars   PROM hip flexion,IR, ER(10 degrees)  pain free ROM Lateral band walks using YTB an knee 4 laps  Bridge on exercise ball 3x10 pain free ROM  Bridge on exercise ball 3x10 pain free ROM  DF/PF on slant board + gastroc stretch 3x45 sec holds   Side lying pain free clamshell ISO 3x10 w 3 sec holds LTR on yellow exercise ball x30 DF/PF on slant board + gastroc stretch 3x45 sec holds DF/PF on slant board + gastroc stretch 3x45 sec holds Tra pulldowns w/ BLE marches using RTB 3x10   Hip flexor,quad, glute , ITB rolling + strumming Bridge on exercise ball 3x10 pain free ROM  Sustained pressure glute musculature  SLS on foam hip ABD+ EXT 2x10 per leg  Hip flexor,quad, glute , ITB rolling + strumming, Hamstring stretch in supine pain free ROM    Hip  flexor,quad, glute , ITB rolling + strumming, Hamstring stretch in supine pain free ROM Hip flexor,quad, glute , ITB rolling + strumming, Hamstring stretch in supine pain free ROM Hip flexor,quad, glute , ITB rolling + strumming, Hamstring stretch in supine pain free ROM    HEP:Access Code: [email or web address]  URL: [email or web address]  Date: 12/30/2024  Prepared by: James Ashraf    Exercises  - Prone Quadriceps Stretch with Strap  - 1 x daily - 5-6 x weekly - 3 sets - 30-40 hold  - Supine Hamstring Stretch  - 1 x daily - 5-6 x weekly - 3 sets - 30-40 hold  - Sidelying Hip Abduction  - 1 x daily - 3-4 x weekly - 3 sets - 10 reps  - Supine Bridge with Mini Swiss Ball Between Knees  - 1 x daily - 3-4 x weekly - 3 sets - 10 reps  - Side Stepping with Resistance at Thighs  - 1 x daily - 3 x weekly - 3 sets - 10 reps  - Sit to Stand  - 1 x daily - 3 x weekly - 3 sets - 10 reps  - Single Leg Stance  - 1 x daily - 3 x weekly - 3 sets - 30-40 hold  - Beginner Front Arm Support  - 1 x daily - 3 x weekly - 3 sets - 10 reps  Charges: There EX(2) 35min MT(1) 10 min       Total Timed Treatment: 45 min  Total Treatment Time: 45 min

## 2025-01-09 ENCOUNTER — PATIENT MESSAGE (OUTPATIENT)
Facility: CLINIC | Age: 60
End: 2025-01-09

## 2025-01-09 DIAGNOSIS — Z96.642 STATUS POST LEFT HIP REPLACEMENT: Primary | ICD-10-CM

## 2025-01-10 RX ORDER — TRAMADOL HYDROCHLORIDE 50 MG/1
50 TABLET ORAL EVERY 6 HOURS PRN
Qty: 40 TABLET | Refills: 0 | Status: SHIPPED | OUTPATIENT
Start: 2025-01-10

## 2025-01-21 ENCOUNTER — TELEPHONE (OUTPATIENT)
Facility: CLINIC | Age: 60
End: 2025-01-21

## 2025-01-21 DIAGNOSIS — Z96.642 STATUS POST LEFT HIP REPLACEMENT: Primary | ICD-10-CM

## 2025-01-22 ENCOUNTER — OFFICE VISIT (OUTPATIENT)
Dept: ORTHOPEDICS CLINIC | Facility: CLINIC | Age: 60
End: 2025-01-22
Payer: COMMERCIAL

## 2025-01-22 ENCOUNTER — HOSPITAL ENCOUNTER (OUTPATIENT)
Dept: GENERAL RADIOLOGY | Age: 60
Discharge: HOME | End: 2025-01-22
Attending: ORTHOPAEDIC SURGERY
Payer: COMMERCIAL

## 2025-01-22 DIAGNOSIS — Z96.642 STATUS POST LEFT HIP REPLACEMENT: ICD-10-CM

## 2025-01-22 DIAGNOSIS — Z96.642 STATUS POST LEFT HIP REPLACEMENT: Primary | ICD-10-CM

## 2025-01-22 PROCEDURE — 99024 POSTOP FOLLOW-UP VISIT: CPT | Performed by: ORTHOPAEDIC SURGERY

## 2025-01-22 PROCEDURE — 73502 X-RAY EXAM HIP UNI 2-3 VIEWS: CPT | Performed by: ORTHOPAEDIC SURGERY

## 2025-01-22 RX ORDER — CELECOXIB 200 MG/1
CAPSULE ORAL
COMMUNITY
Start: 2022-06-15

## 2025-01-22 NOTE — PROGRESS NOTES
[facility] Progress Note    Subjective: Very pleasant 59-year-old female returns to clinic 6 weeks postop from anterior left hip replacement.  Feels good with the hip, leg lengths equalized.  Taking tramadol at night for sleep, otherwise Celebrex occasionally, no other medications routinely for pain.  Has been doing therapy on her own, getting back into the gym.  Has some burning occasionally/pins-and-needles along the inside of the incision.    Objective: appears comfortable, very pleasant, fit and healthy.  Left hip incision is well-healed.  Ambulating without assist device, level gait      Imaging: X-rays of the left hip and pelvis personally viewed, independently interpreted and radiology report read.  Straits uncemented left total hip arthroplasty unchanged from postoperative films.  Leg lengths near spot on equal measuring teardrop to lesser trochanter.  Cup abduction 40 degrees, anteversion 25.  No subsidence of the stem.      Assessment/Plan: 59-year-old female 6 weeks postop status post anterior left total hip arthroplasty.  Doing very well.  Continue activities as tolerated, weaning back into the gym.  No restrictions on the incision.  Done with aspirin DVT prophylaxis.  Not requiring pain med refills today.  Follow-up in 6 weeks for reassessment.    Ruddy Sanchez MD, FAAOS  [facility]  Phone [phone]  Fax [phone]

## 2025-01-29 ENCOUNTER — PATIENT MESSAGE (OUTPATIENT)
Facility: CLINIC | Age: 60
End: 2025-01-29

## 2025-01-29 DIAGNOSIS — M16.12 PRIMARY OSTEOARTHRITIS OF LEFT HIP: ICD-10-CM

## 2025-01-29 DIAGNOSIS — Z96.642 STATUS POST LEFT HIP REPLACEMENT: ICD-10-CM

## 2025-01-30 RX ORDER — TRAMADOL HYDROCHLORIDE 50 MG/1
50 TABLET ORAL EVERY 6 HOURS PRN
Qty: 40 TABLET | Refills: 0 | Status: SHIPPED | OUTPATIENT
Start: 2025-01-30

## 2025-01-30 RX ORDER — SENNA AND DOCUSATE SODIUM 50; 8.6 MG/1; MG/1
1 TABLET, FILM COATED ORAL DAILY PRN
Qty: 30 TABLET | Refills: 0 | Status: SHIPPED | OUTPATIENT
Start: 2025-01-30

## 2025-01-30 NOTE — TELEPHONE ENCOUNTER
LOV 1/22/25  DOS 12/10/24, left hip    Requesting refill on senna-docusate.     Still taking tramadol and celebrex for post-operative pain.   With constipation still, BM every 2-3 days.  Using senna-docusate and occasional miralax.  Requesting stronger medication but okay with what is prescribed if feel is best.    DOS: 12/10/24  Last OV: 1/22/25  Last refill date: 12/11/24     #/refills: 30/0  Upcoming appt: 3/10/25

## 2025-01-30 NOTE — TELEPHONE ENCOUNTER
Tramadol 50 mg  DOS: 12/10/24  Last OV: 1/22/25  Last refill date: 1/10/25     #/refills: 40/0  Upcoming appt:   Future Appointments   Date Time Provider Department Center   2/7/2025  8:00 AM KELSEY LANGE [facility] [address]   3/10/2025  8:20 AM Ruddy Sanchez MD [facility] [address]

## 2025-02-07 ENCOUNTER — HOSPITAL ENCOUNTER (OUTPATIENT)
Dept: MAMMOGRAPHY | Age: 60
Discharge: HOME | End: 2025-02-07
Attending: NURSE PRACTITIONER
Payer: COMMERCIAL

## 2025-02-07 ENCOUNTER — TELEPHONE (OUTPATIENT)
Dept: INTERNAL MEDICINE CLINIC | Facility: CLINIC | Age: 60
End: 2025-02-07

## 2025-02-07 DIAGNOSIS — Z13.228 SCREENING FOR METABOLIC DISORDER: ICD-10-CM

## 2025-02-07 DIAGNOSIS — Z13.220 SCREENING FOR LIPID DISORDERS: ICD-10-CM

## 2025-02-07 DIAGNOSIS — Z12.31 ENCOUNTER FOR SCREENING MAMMOGRAM FOR MALIGNANT NEOPLASM OF BREAST: ICD-10-CM

## 2025-02-07 DIAGNOSIS — Z13.0 SCREENING FOR BLOOD DISEASE: ICD-10-CM

## 2025-02-07 DIAGNOSIS — Z00.00 ROUTINE GENERAL MEDICAL EXAMINATION AT A HEALTH CARE FACILITY: Primary | ICD-10-CM

## 2025-02-07 DIAGNOSIS — Z13.29 SCREENING FOR THYROID DISORDER: ICD-10-CM

## 2025-02-07 PROCEDURE — 77063 BREAST TOMOSYNTHESIS BI: CPT | Performed by: NURSE PRACTITIONER

## 2025-02-07 PROCEDURE — 77067 SCR MAMMO BI INCL CAD: CPT | Performed by: NURSE PRACTITIONER

## 2025-02-07 NOTE — TELEPHONE ENCOUNTER
Patient called request labs prior to their annual physical.  Annual physical scheduled for   Future Appointments   Date Time Provider Department Center   12/30/2025  8:00 AM Carley Resendiz APRN [facility] [address] [facility] [address]        Please order labs. Patient preferred lab is [facility]  Patient informed request was sent to clinical team.  Patient informed to fast for labs.  No callback required.

## 2025-02-13 ENCOUNTER — TELEPHONE (OUTPATIENT)
Facility: CLINIC | Age: 60
End: 2025-02-13

## 2025-02-13 NOTE — TELEPHONE ENCOUNTER
Juana from [facility] called to clarify if patient needs an antibiotic prior to a cleaning Today.  It was clarified that we recommend she wait 3 months post surgically to have any elective dental work done.  Including cleanings.     DOS: 12/10/24 - Left hip replacement  DOS: 9/12/24 - Right hip replacement    Patient should wait until 3/10/25.     They previously faxed a form addressing this to the office.  It was requested they re-fax the form.

## 2025-02-13 NOTE — TELEPHONE ENCOUNTER
- Fozia was at the office for a cleaning and than refused the cleaning when she realized she was supposed to wait 3 months.     Dr. Wright Called to verify again our current recommendations.  The following was reviewed with her.     At this time Fozia Ivan does not need to be premedicated for dental work as per the current recommendation of the [employer] (AAHKS).  If there are any questions or concerns, please do not hesitate to contact our office.  We do however recommend patient waits till after 3 before proceeding with any elective dental work.

## 2025-02-25 ENCOUNTER — PATIENT MESSAGE (OUTPATIENT)
Dept: PHYSICAL MEDICINE AND REHAB | Facility: CLINIC | Age: 60
End: 2025-02-25

## 2025-02-25 DIAGNOSIS — M25.511 ACUTE PAIN OF RIGHT SHOULDER: Primary | ICD-10-CM

## 2025-02-26 NOTE — TELEPHONE ENCOUNTER
Pt called c/o R shoulder burning pain. Denies n/t. States that she completed physical therapy and had a steroid injection in her shoulder by Dr. Jean Baptiste around 10 years ago that provided relief up until around a month ago.     Pt also states that she is having limited mobility and pain in both thumbs. She has had issues with the left thumb for a few years, however the right thumb issues began around mid December and \"came out of no where\". Pt wondering if this can have any relation to the shoulder issues.     CPL: 10/10. Pt has been taking celebrex and tramadol after hip surgery that seem to provide some relief to shoulder.     Per Dr. Jean Baptiste's note on 7/22/13:   \" She has a history of frozen shoulder 10 to 12 years ago. She sleeps on the right side a lot. This all started with the back pain back in March of 2013. She had 6 weeks of therapy. About 6 weeks ago is when she first started feeling the pain on top of the shoulder. It hurts to put a bra over that area She points right to the AC joint as where her pain is. There was no injury she can recall. She was prescribed physical therapy for the shoulder but decided she probably ought to see a shoulder surgeon before going through many weeks of physical therapy.  ....  I would recommend a trial of a cortisone injection into the AC joint\"      LOV: 8/30/2024 Vinod Ortega DO    NOV: 3/6/2025 Vinod Ortega DO     Summary of patient request: Pt inquiring if Dr. Ortega does shoulder injections/can give her a steroid injection in her shoulder or if she needs to make her appointment elsewhere. RN explained that Dr. Ortega does do shoulder injections, however updated imaging may be necessary and an assessment will need to be completed prior to interventions. Pt verbalized understanding and informed RN that if necessary, she would like to focus on shoulder pain before thumbs.    Routing encounter to Dr. Ortega and requesting imaging if necessary to be ordered prior to  appointment on 3/6.

## 2025-02-27 ENCOUNTER — HOSPITAL ENCOUNTER (OUTPATIENT)
Dept: GENERAL RADIOLOGY | Age: 60
Discharge: HOME | End: 2025-02-27
Attending: PHYSICAL MEDICINE & REHABILITATION
Payer: COMMERCIAL

## 2025-02-27 DIAGNOSIS — M25.511 ACUTE PAIN OF RIGHT SHOULDER: ICD-10-CM

## 2025-02-27 PROCEDURE — 73030 X-RAY EXAM OF SHOULDER: CPT | Performed by: PHYSICAL MEDICINE & REHABILITATION

## 2025-03-06 ENCOUNTER — OFFICE VISIT (OUTPATIENT)
Dept: PHYSICAL MEDICINE AND REHAB | Facility: CLINIC | Age: 60
End: 2025-03-06
Payer: COMMERCIAL

## 2025-03-06 ENCOUNTER — TELEPHONE (OUTPATIENT)
Dept: PHYSICAL MEDICINE AND REHAB | Facility: CLINIC | Age: 60
End: 2025-03-06

## 2025-03-06 VITALS — HEIGHT: 63.7 IN | BODY MASS INDEX: 24.2 KG/M2 | WEIGHT: 140 LBS

## 2025-03-06 DIAGNOSIS — M25.511 PAIN IN RIGHT ACROMIOCLAVICULAR JOINT: Primary | ICD-10-CM

## 2025-03-06 DIAGNOSIS — M79.644 PAIN OF RIGHT THUMB: ICD-10-CM

## 2025-03-06 PROCEDURE — 20606 DRAIN/INJ JOINT/BURSA W/US: CPT | Performed by: PHYSICAL MEDICINE & REHABILITATION

## 2025-03-06 PROCEDURE — 99214 OFFICE O/P EST MOD 30 MIN: CPT | Performed by: PHYSICAL MEDICINE & REHABILITATION

## 2025-03-06 RX ORDER — LIDOCAINE HYDROCHLORIDE 10 MG/ML
1 INJECTION, SOLUTION INFILTRATION; PERINEURAL ONCE
Status: COMPLETED | OUTPATIENT
Start: 2025-03-06 | End: 2025-03-06

## 2025-03-06 RX ORDER — TRIAMCINOLONE ACETONIDE 40 MG/ML
40 INJECTION, SUSPENSION INTRA-ARTICULAR; INTRAMUSCULAR ONCE
Status: COMPLETED | OUTPATIENT
Start: 2025-03-06 | End: 2025-03-06

## 2025-03-06 RX ADMIN — LIDOCAINE HYDROCHLORIDE 1 ML: 10 INJECTION, SOLUTION INFILTRATION; PERINEURAL at 11:21:00

## 2025-03-06 RX ADMIN — TRIAMCINOLONE ACETONIDE 40 MG: 40 INJECTION, SUSPENSION INTRA-ARTICULAR; INTRAMUSCULAR at 11:21:00

## 2025-03-06 NOTE — PROGRESS NOTES
Progress note    C/C:   Chief Complaint   Patient presents with    Follow - Up     LOV 8/30/24 Pt is here for a follow up and has complaints of bilateral shoulder pain that radiates into her neck.  Also has n/t in thumbs. XR of shoulder was completed 2/27/25. Takes Celebrex and tramadol to ease pain. No current physical therapy.      HPI: 59 year old female presents for follow up. New onset/recurrent right shoulder pain. Has a history of adhesive capsulitis that recovered, and then years later right shoulder pain that recovered well from a shoulder injection. Note reviewed from 2013, she had AC joint pain at that time and underwent a right AC joint with relief for over 10 years.  No inciting event.  Has been sleeping sometimes with the arm crossed over the other.  Has been recovering from bilateral hip joint arthroplasties, right in September 2024, left December 2024.    Also having bilateral thumb pain, right worse than left. Left has been painful for the past 10 years.  No associated numbness or tingling.  Localized over the as well as the thenar eminence dorsal surface of the wrist.    Pertinent allergies: Allergies[1]     Physical exam:  Ht 63.7\"   Wt 140 lb (63.5 kg)   LMP 08/08/2024   BMI 24.26 kg/m²      PE right shoulder exam:    Range of motion:  Forward flexion: 160 degrees  Abduction: 160 degrees  Internal rotation: T4  External rotation: no restriction to external rotation    Provocative test:  Supraspinatus test: -  Hawkin's test: -  Neer's test: -  AC joint tenderness: mildly painful  Cross arm adduction test: -    Scouring right thumb negative  Finkelstein test not overly painful  SILT m/r/u distributions of the right hand    Imaging: X-ray right shoulder independently reviewed, and was report.  Slight downsloping of the acromion, though not significantly hooked. Minimal AC joint narrowing. No AC joint effusion.    Assessment and plan  Right AC joint pain  Left thumb pain. De Quervain tenosynovitis  vs CMC joint pain    Recovering from left LILLIAM. Recommend right AC joint injection under US guidance; done today, see separate note for details. Physical therapy for periscapular strengthening if shoulder pain reoccurs or does not improve, once able to do PT for the shoulder.  She should apply Voltaren gel over the painful areas of the thumb.  If no better consider an x-ray of the right thumb to evaluate for CMC osteoarthritis.    Vinod Ortega DO  Physical Medicine and Rehabilitation  [facility]         [1]   Allergies  Allergen Reactions    Cephalexin HIVES    Penicillins UNKNOWN and RASH

## 2025-03-06 NOTE — PROCEDURES
Dx: Right AC joint pain    AC joint was identified under US with the patient in the seated position using a hockey stick probe. The surface was cleansed with betadine swabs. Ethyl chloride was used to numb the skin. A 25 gauage 1 1/2 inch needle was advanced into the AC joint using an out of plane approach. After negative aspiration 1ml of 1% lidocaine, and 40mg kenalog was injected into the AC joint space for a total injectate of 2ml. The needle was then withdrawn. Image was saved of the injection.     Vinod Ortega DO  Physical Medicine and Rehabilitation  [facility]

## 2025-03-06 NOTE — TELEPHONE ENCOUNTER
Initiated authorization for Right AC joint injection under US guidance. CPT/HCPCS 33505, () dx:M25.511 with [facility] portal.  Completed in the office today    Status: Prior Authorization/Notification is not required  Decision ID #: [id number]    Authorization is not required based on medical necessity, however, is not a guarantee of payment and may be subject to review once claim is submitted.

## 2025-03-10 ENCOUNTER — PATIENT MESSAGE (OUTPATIENT)
Dept: PHYSICAL MEDICINE AND REHAB | Facility: CLINIC | Age: 60
End: 2025-03-10

## 2025-03-10 ENCOUNTER — OFFICE VISIT (OUTPATIENT)
Facility: CLINIC | Age: 60
End: 2025-03-10
Payer: COMMERCIAL

## 2025-03-10 DIAGNOSIS — M79.645 PAIN OF LEFT THUMB: ICD-10-CM

## 2025-03-10 DIAGNOSIS — Z96.642 STATUS POST LEFT HIP REPLACEMENT: Primary | ICD-10-CM

## 2025-03-10 DIAGNOSIS — M79.644 PAIN OF RIGHT THUMB: Primary | ICD-10-CM

## 2025-03-10 PROCEDURE — 99024 POSTOP FOLLOW-UP VISIT: CPT | Performed by: ORTHOPAEDIC SURGERY

## 2025-03-10 RX ORDER — CELECOXIB 200 MG/1
200 CAPSULE ORAL DAILY PRN
Qty: 60 CAPSULE | Refills: 0 | Status: SHIPPED | OUTPATIENT
Start: 2025-03-10

## 2025-03-10 NOTE — PROGRESS NOTES
[facility] Progress Note    Subjective: Very pleasant patient returns to clinic 3 months postop from anterior left hip replacement.  Reports she is doing well, the hip is just behind the right hip and recovery.  She has been active, getting to the gym.  Notes that she does get an occasional crack along the inside of the thigh, but it is not painful.  She is happy with her recovery thus far.    Objective: Patient appears comfortable, very pleasant, incisions well-healed bilaterally.  She ambulates with a nonantalgic gait.      Assessment/Plan: 59-year-old female 3 months postop from anterior left hip replacement.  Doing very well.  Continue with activities as tolerated.  Continue exercise, ramping up as she has been doing.  Follow-up at 1 year from postop date of the left hip for evaluation of both hips.    Ruddy Sanchez MD, FAAOS  [facility] Orthopaedic Surgery  Phone [phone]  Fax [phone]

## 2025-03-14 ENCOUNTER — HOSPITAL ENCOUNTER (OUTPATIENT)
Dept: GENERAL RADIOLOGY | Age: 60
Discharge: HOME | End: 2025-03-14
Attending: PHYSICAL MEDICINE & REHABILITATION
Payer: COMMERCIAL

## 2025-03-14 DIAGNOSIS — M79.644 PAIN OF RIGHT THUMB: ICD-10-CM

## 2025-03-14 DIAGNOSIS — M79.645 PAIN OF LEFT THUMB: ICD-10-CM

## 2025-03-14 PROCEDURE — 73140 X-RAY EXAM OF FINGER(S): CPT | Performed by: PHYSICAL MEDICINE & REHABILITATION

## 2025-06-01 DIAGNOSIS — M16.0 PRIMARY OSTEOARTHRITIS OF BOTH HIPS: ICD-10-CM

## 2025-06-02 ENCOUNTER — PATIENT MESSAGE (OUTPATIENT)
Facility: CLINIC | Age: 60
End: 2025-06-02

## 2025-06-02 RX ORDER — CELECOXIB 200 MG/1
200 CAPSULE ORAL 2 TIMES DAILY
Qty: 180 CAPSULE | Refills: 0 | OUTPATIENT
Start: 2025-06-02

## 2025-06-02 NOTE — TELEPHONE ENCOUNTER
Celecoxib  200mg    DOS: 12/10/24 Left hip replacement  Last OV: 3/10/25  Last refill date: 4/7/25 #/refills: 180/0  Upcoming appt:   Future Appointments   Date Time Provider Department Center   12/10/2025  8:20 AM Ruddy Sanchez MD [facility]     Refill too soon

## 2025-07-15 NOTE — PLAN OF CARE
Patient A&Ox4, VSS on RA. Patient reports pain to right hip; PO pain medications given. POD 1 dressing C/D/I. Gel ice as tolerated. Plan to see PT/OT and DC with [facility]. Spoke with Dr. Sanchez, patient okay to discharge after therapy. Plan of care reviewed with patient. Patient demonstrates understanding.   Quality 47: Advance Care Plan: Advance Care Planning discussed and documented; advance care plan or surrogate decision maker documented in the medical record. Detail Level: Detailed Quality 226: Preventive Care And Screening: Tobacco Use: Screening And Cessation Intervention: Patient screened for tobacco use and is an ex/non-smoker Quality 130: Documentation Of Current Medications In The Medical Record: Current Medications Documented

## 2025-08-29 ENCOUNTER — LAB ENCOUNTER (OUTPATIENT)
Dept: LAB | Age: 60
End: 2025-08-29
Attending: PHYSICAL MEDICINE & REHABILITATION

## 2025-08-29 ENCOUNTER — OFFICE VISIT (OUTPATIENT)
Dept: PHYSICAL MEDICINE AND REHAB | Facility: CLINIC | Age: 60
End: 2025-08-29

## 2025-08-29 VITALS — HEIGHT: 63.7 IN | BODY MASS INDEX: 24.2 KG/M2 | RESPIRATION RATE: 14 BRPM | WEIGHT: 140 LBS

## 2025-08-29 DIAGNOSIS — M25.50 MULTIPLE JOINT PAIN: Primary | ICD-10-CM

## 2025-08-29 DIAGNOSIS — M18.11 ARTHRITIS OF CARPOMETACARPAL (CMC) JOINT OF RIGHT THUMB: ICD-10-CM

## 2025-08-29 DIAGNOSIS — M25.50 MULTIPLE JOINT PAIN: ICD-10-CM

## 2025-08-29 DIAGNOSIS — M25.511 PAIN IN RIGHT ACROMIOCLAVICULAR JOINT: ICD-10-CM

## 2025-08-29 DIAGNOSIS — M18.12 ARTHRITIS OF CARPOMETACARPAL (CMC) JOINT OF LEFT THUMB: ICD-10-CM

## 2025-08-29 LAB
CRP SERPL-MCNC: <0.5 MG/DL (ref ?–0.5)
ERYTHROCYTE [SEDIMENTATION RATE] IN BLOOD: 3 MM/HR (ref 0–30)
RHEUMATOID FACT SERPL-ACNC: 6.3 IU/ML (ref ?–14)

## 2025-08-29 PROCEDURE — 86431 RHEUMATOID FACTOR QUANT: CPT

## 2025-08-29 PROCEDURE — 86038 ANTINUCLEAR ANTIBODIES: CPT

## 2025-08-29 PROCEDURE — 86140 C-REACTIVE PROTEIN: CPT

## 2025-08-29 PROCEDURE — 36415 COLL VENOUS BLD VENIPUNCTURE: CPT

## 2025-08-29 PROCEDURE — 86200 CCP ANTIBODY: CPT

## 2025-08-29 PROCEDURE — 85652 RBC SED RATE AUTOMATED: CPT

## 2025-08-29 PROCEDURE — 86225 DNA ANTIBODY NATIVE: CPT

## 2025-08-29 RX ORDER — TRIAMCINOLONE ACETONIDE 40 MG/ML
20 INJECTION, SUSPENSION INTRA-ARTICULAR; INTRAMUSCULAR ONCE
Status: COMPLETED | OUTPATIENT
Start: 2025-08-29 | End: 2025-08-29

## 2025-08-29 RX ORDER — DULOXETIN HYDROCHLORIDE 30 MG/1
30 CAPSULE, DELAYED RELEASE ORAL DAILY
Qty: 30 CAPSULE | Refills: 1 | Status: SHIPPED | OUTPATIENT
Start: 2025-08-29

## 2025-08-29 RX ORDER — LIDOCAINE HYDROCHLORIDE 10 MG/ML
0.5 INJECTION, SOLUTION INFILTRATION; PERINEURAL ONCE
Status: COMPLETED | OUTPATIENT
Start: 2025-08-29 | End: 2025-08-29

## (undated) DEVICE — BANDAGE,COHESIVE,TAN,4X5YD,LF,STRL: Brand: MEDLINE

## (undated) DEVICE — REMOVER PREP SOLUTION 4OZ

## (undated) DEVICE — HOOD: Brand: FLYTE

## (undated) DEVICE — COVER,LIGHT,CAMERA,HARD,1/PK,STRL: Brand: MEDLINE

## (undated) DEVICE — GLOVE SURG SENSICARE SZ 7

## (undated) DEVICE — SHEET,DRAPE,70X100,STERILE: Brand: MEDLINE

## (undated) DEVICE — GLOVE SUR 7.5 SENSICARE PI PIP CRM PWD F

## (undated) DEVICE — GLOVE SUR 8.5 SENSICARE PI PIP CRM PWD F

## (undated) DEVICE — STRYKER PERFORMANCE SERIES SAGITTAL BLADE: Brand: STRYKER PERFORMANCE SERIES

## (undated) DEVICE — PENCIL ES BTTN SWCH W/ TIP HOLSTER E-Z CLN

## (undated) DEVICE — SKIN MARKER DUAL TIP WITH RULER CAP AND LABELS: Brand: DEVON

## (undated) DEVICE — Device

## (undated) DEVICE — AVANOS* TUOHY EPIDURAL NEEDLE: Brand: AVANOS

## (undated) DEVICE — GLOVE SURG SENSICARE SZ 7-1/2

## (undated) DEVICE — PAIN TRAY: Brand: MEDLINE INDUSTRIES, INC.

## (undated) DEVICE — BANDAID CURAD 3IN X 1IN

## (undated) DEVICE — GLOVE SUR 8.5 SENSICARE PI PIP GRN PWD F

## (undated) DEVICE — GLOVE SURG SENSICARE SZ 6-1/2

## (undated) DEVICE — GOWN,SIRUS,FABRIC-REINFORCED,X-LARGE: Brand: MEDLINE

## (undated) DEVICE — TIP CLEANER: Brand: VALLEYLAB

## (undated) DEVICE — WRAP HIP COMPR

## (undated) DEVICE — SUT PERMA- 0 30IN NABSRB BLK TIE SILK

## (undated) DEVICE — NEEDLE SPINAL 22X3-1/2 BLK

## (undated) DEVICE — C-ARM: Brand: UNBRANDED

## (undated) DEVICE — ELECTRODE ES L16.5CM BLDE MPLR OPN APPRCH EZ

## (undated) DEVICE — NEPTUNE E-SEP SMOKE EVACUATION PENCIL, COATED, 70MM BLADE, PUSH BUTTON SWITCH: Brand: NEPTUNE E-SEP

## (undated) DEVICE — SLEEVE COMPR MD KNEE LEN SGL USE KENDALL SCD

## (undated) DEVICE — BNDG ADH W1INXL3IN NAT FAB N

## (undated) DEVICE — ADHESIVE SKIN TOP FOR WND CLSR DERMBND ADV

## (undated) DEVICE — NEEDLE SPNL 18GA L3.5IN PNK QNCKE STYL DISP

## (undated) DEVICE — ATTAHJA VAC WITH 22MM CONNECTR

## (undated) DEVICE — ANTIBACTERIAL UNDYED BRAIDED (POLYGLACTIN 910), SYNTHETIC ABSORBABLE SUTURE: Brand: COATED VICRYL

## (undated) DEVICE — BIT DRL 3.2X30MM HIP DISP FOR 2

## (undated) DEVICE — SYRINGE MED 30ML STD CLR PLAS LL TIP N CTRL

## (undated) DEVICE — 3M™ STERI-STRIP™ REINFORCED ADHESIVE SKIN CLOSURES, R1548, 1 IN X 5 IN (25 MM X 125 MM), 4 STRIPS/ENVELOPE: Brand: 3M™ STERI-STRIP™

## (undated) DEVICE — BIPOLAR SEALER 23-112-1 AQM 6.0: Brand: AQUAMANTYS™

## (undated) DEVICE — TOWEL,OR,DSP,ST,BLUE,DLX,2/PK,40PK/CS: Brand: MEDLINE

## (undated) DEVICE — SUT MCRYL 4-0 18IN PS-2 ABSRB UD 19MM 3/8 CIR

## (undated) DEVICE — GLOVE SUR 8 SENSICARE PI PIP CRM PWD F

## (undated) DEVICE — LAPAROTOMY SPONGE - RF AND X-RAY DETECTABLE PRE-WASHED: Brand: SITUATE

## (undated) DEVICE — BANDAGE ADH COVERLET 3X1IN

## (undated) DEVICE — TOTAL HIP CDS: Brand: MEDLINE INDUSTRIES, INC.

## (undated) DEVICE — GLOVE SUR 7.5 SENSICARE PI PIP GRN PWD F

## (undated) DEVICE — BANDAGE COBAN 4IN X 5YD TAN E POR SLF ADH COBAN

## (undated) DEVICE — LEUKOPLAST ELASTIC STERILE 25X75MM TAN 100 1"X3": Brand: LEUKOPLAST®

## (undated) DEVICE — SHEET, T, LAPAROTOMY, STERILE: Brand: MEDLINE

## (undated) DEVICE — HOOD, PEEL-AWAY: Brand: FLYTE

## (undated) DEVICE — MEGADYNE ELECTRODE ADULT PT RT

## (undated) DEVICE — RF CANNULA, CVD: Brand: VENOM

## (undated) NOTE — LETTER
Date: 2025      Patient Name: Fozia Ivan      : 10/20/1965        Thank you for choosing [facility] as your health care provider. Your physician has deemed the following medical service(s) necessary. However, your insurance plan may not pay for all of your health care and costs and may deny payment for this service. The fact that your insurance plan does not pay for an item or service does not mean you should not receive it. The purpose of this form is to help you make an informed decision about whether or not you want to receive this service(s) that may not be paid for by your insurance plan.    CPT Code Description     Cost     Right AC joint injection under US guidance       I understand that the above mentioned service(s) or supply may not be covered by my insurance company. I agree to be financially responsible for the cost of this service or supply in the event of my insurance denies payment as a non-covered benefit.        ______________________________________________________________________  Signature of Patient or Patient's Representative  Relationship  Date    ______________________________________________________________________  Signature of Witness to signing of form   Printed Name

## (undated) NOTE — LETTER
AUTHORIZATION FOR SURGICAL OPERATION OR OTHER PROCEDURE    1. I hereby authorize Dr. Vinod Ortega and the [facility] staff assigned to my case to perform the following operation and/or procedure at the [facility]:    Right AC joint injection under US guidance     2.  My physician has explained the nature and purpose of the operation or other procedure, possible alternative methods of treatment, the risks involved, and the possibility of complication to me.  I acknowledge that no guarantee has been made as to the result that may be obtained.  3.  I recognize that, during the course of this operation, or other procedure, unforseen conditions may necessitate additional or different procedure than those listed above.  I, therefore, further authorize and request that the above named physician, his/her physician assistants or designees perform such procedures as are, in his/her professional opinion, necessary and desirable.  4.  Any tissue or organs removed in the operation or other procedure may be disposed of by and at the discretion of the [facility] staff and [facility].  5.  I understand that in the event of a medical emergency, I will be transported by local paramedics to [facility] or other hospital emergency department.  6.  I certify that I have read and fully understand the above consent to operation and/or other procedure.    7.  I acknowledge that my physician has explained sedation/analgesia administration to me including the risks and benefits.  I consent to the administration of sedation/analgesia as may be necessary or desirable in the judgement of my physician.    Witness signature: ___________________________________________________ Date:  ______/______/_____                    Time:  ________ A.M.  P.M.       Patient Name: Fozia Ivan  10/20/1965  [id number]         Patient signature:  ___________________________________________________                 Statement  of Physician  My signature below affirms that prior to the time of the procedure, I have explained to the patient and/or his/her guardian, the risks and benefits involved in the proposed treatment and any reasonable alternative to the proposed treatment.  I have also explained the risks and benefits involved in the refusal of the proposed treatment and have answered the patient's questions.                        Date:  ______/______/_______  Provider                      Signature:  __________________________________________________________       Time:  ___________ A.M    P.M.

## (undated) NOTE — LETTER
24    Orthopedic Surgery   Pre-Operative Clearance Request    Patient Name:   Fozia Ivan             :   10/20/1965    Surgeon: Dr. Sanchez             Date of Surgery: 12/10/2024    Surgical Procedure: Left anterior total hip arthroplasty       MUST COMPLETE ALL OF THE FOLLOWING 2-3 WEEKS PRIOR TO YOUR SURGERY TO AVOID CANCELLATION, DUE TO THE RULE THEIR WILL BE NO EXCEPTIONS!      [x]  History and Physical        [x]  Medical  Clearance                     Required pre-op testing to be ordered:                        [x]  CBC W/Diff                                                                   [x]  CMP                                                                                                                                                                                                                             [x]  PT/INR    [x]  PTT     [x]  Type and Screen                        **Please fax test results, H&P, and clearance to [phone] and to [facility] at [phone]**

## (undated) NOTE — ED AVS SNAPSHOT
Nereida Blas   MRN: [id number]    Department:  [facility] Emergency Department   Date of Visit:  11/12/2017           Disclosure     Insurance plans vary and the physician(s) referred by the ER may not be covered by your plan.  Please contact yo If you have been prescribed any medication(s), please fill your prescription right away and begin taking the medication(s) as directed    If the emergency physician has read X-rays, these will be re-interpreted by a radiologist.  If there is a significant

## (undated) NOTE — LETTER
Date: 2023        Patient: Gautam Fernandez  : 10/20/1965  Member ID #: [id number]   Reference # [id number]       RE: APPEAL for Denial of Bilateral L3, L4, L5 medial nerve branch neurotomies        To whom it may concern:     I am writing to appeal the denial of a  Bilateral L3, L4, L5 medial nerve branch neurotomies for patient Fozia. I have been treating Gautam Fernandez for diagnosis of: Lumbar facet arthropathy, Bilateral primary osteoarthritis of both hips and bilateral iliopsoas bursitis    Patient received 90% relief from the previous Bilateral L3, L4, L5 medial nerve branch neurotomies that was completed on 3/16/2023 by . In my professional opinion, due to patient's current symptoms and previous positive response to the above procedure,  it is medically necessary for patient to have the following procedure: Bilateral L3, L4, L5 medial nerve branch neurotomies. The decision by the patient's insurance company to deny coverage constitutes an act of withholding treatment that can potentially benefit a patient and therefore we would like to appeal for reconsideration of the recommended treatment.         Sincerely,           [facility]  Physical Medicine and Rehabilitation

## (undated) NOTE — ED AVS SNAPSHOT
Silvia Luque   MRN: [id number]    Department:  [facility] Emergency Department   Date of Visit:  10/30/2017           Disclosure     Insurance plans vary and the physician(s) referred by the ER may not be covered by your plan.  Please contact yo If you have been prescribed any medication(s), please fill your prescription right away and begin taking the medication(s) as directed    If the emergency physician has read X-rays, these will be re-interpreted by a radiologist.  If there is a significant

## (undated) NOTE — ED AVS SNAPSHOT
Karly Hartman   MRN: [id number]    Department:  [facility] Emergency Department   Date of Visit:  1/23/2019           Disclosure     Insurance plans vary and the physician(s) referred by the ER may not be covered by your plan.  Please contact you tell this physician (or your personal doctor if your instructions are to return to your personal doctor) about any new or lasting problems. The primary care or specialist physician will see patients referred from the [facility] Emergency Department.  Roberto Serna

## (undated) NOTE — ED AVS SNAPSHOT
Olivia Worley   MRN: [id number]    Department:  [facility] Emergency Department   Date of Visit:  8/2/2018           Disclosure     Insurance plans vary and the physician(s) referred by the ER may not be covered by your plan.  Please contact your tell this physician (or your personal doctor if your instructions are to return to your personal doctor) about any new or lasting problems. The primary care or specialist physician will see patients referred from the [facility] Emergency Department.  Neela Lowe

## (undated) NOTE — LETTER
[address], [facility]   Date:   11/10/2022     Name:   Chi Burrell    YOB: 1965   MRN:   [id number]       WHERE IS YOUR PAIN NOW? Brenda the areas on your body where you feel the described sensations. Use the appropriate symbol. Bartholome Litten the areas of radiation. Include all affected areas. Just to complete the picture, please draw in the face. ACHE:  ^ ^ ^   NUMBNESS:  0000   PINS & NEEDLES:  = = = =                              ^ ^ ^                       0000              = = = =                                    ^ ^ ^                       0000            = = = =      BURNING:  XXXX   STABBING: ////                  XXXX                ////                         XXXX          ////     Please brenda the line below indicating your degree of pain right now  with 0 being no pain 10 being the worst pain possible.                                          0             1             2              3             4              5              6              7             8             9             10         Patient Signature:

## (undated) NOTE — LETTER
Date: November 10, 2022      Patient Name: Loan Brown      : 10/20/1965        Thank you for choosing [facility] as your health care provider. Your physician has deemed the following medical service(s) necessary. However, your insurance plan may not pay for all of your health care and costs and may deny payment for this service. The fact that your insurance plan does not pay for an item or service does not mean you should not receive it. The purpose of this form is to help you make an informed decision about whether or not you want to receive this service(s) that may not be paid for by your insurance plan. CPT Code Description     Cost     Right iliopsoas bursa steroid injection under US guidance    _________ ______________________________ _____________      _________ ______________________________ _____________      I understand that the above mentioned service(s) or supply may not be covered by my insurance company.  I agree to be financially responsible for the cost of this service or supply in the event of my insurance denies payment as a non-covered benefit.        ______________________________________________________________________  Signature of Patient or Patient's Representative  Relationship  Date    ______________________________________________________________________  Signature of Witness to signing of form   Printed Name

## (undated) NOTE — LETTER
AUTHORIZATION FOR SURGICAL OPERATION OR OTHER PROCEDURE    1. I hereby authorize Dr. Dedra Thompson and the [facility] staff assigned to my case to perform the following operation and/or procedure at the [facility]:    Right iliopsoas bursa steroid injection under US guidance      2. My physician has explained the nature and purpose of the operation or other procedure, possible alternative methods of treatment, the risks involved, and the possibility of complication to me. I acknowledge that no guarantee has been made as to the result that may be obtained. 3.  I recognize that, during the course of this operation, or other procedure, unforseen conditions may necessitate additional or different procedure than those listed above. I, therefore, further authorize and request that the above named physician, his/her physician assistants or designees perform such procedures as are, in his/her professional opinion, necessary and desirable. 4.  Any tissue or organs removed in the operation or other procedure may be disposed of by and at the discretion of the [facility] staff and [facility]. 5.  I understand that in the event of a medical emergency, I will be transported by local paramedics to [facility] or other [facility] emergency department. 6.  I certify that I have read and fully understand the above consent to operation and/or other procedure. 7.  I acknowledge that my physician has explained sedation/analgesia administration to me including the risks and benefits. I consent to the administration of sedation/analgesia as may be necessary or desirable in the judgement of my physician. Witness signature: ___________________________________________________ Date:  ______/______/_____                    Time:  ________ A. M.  P.M.        Patient Name:  Maryann Jeffries  10/20/1965  [id number]       Patient signature: ___________________________________________________          Statement of Physician  My signature below affirms that prior to the time of the procedure, I have explained to the patient and/or his/her guardian, the risks and benefits involved in the proposed treatment and any reasonable alternative to the proposed treatment. I have also explained the risks and benefits involved in the refusal of the proposed treatment and have answered the patient's questions.                         Date:  ______/______/_______  Provider                      Signature:  __________________________________________________________       Time:  ___________ ALORA BORDEN

## (undated) NOTE — LETTER
Date: 2023      Patient Name: Servando Edward      : 10/20/1965        Thank you for choosing  [facility] as your health care provider. Your physician has deemed the following medical service(s) necessary. However, your insurance plan may not pay for all of your health care and costs and may deny payment for this service. The fact that your insurance plan does not pay for an item or service does not mean you should not receive it. The purpose of this form is to help you make an informed decision about whether or not you want to receive this service(s) that may not be paid for by your insurance plan. CPT Code Description     Cost      Bilateral iliopsoas bursa steroid injections under US guidance     _________ ______________________________ _____________      _________ ______________________________ _____________      I understand that the above mentioned service(s) or supply may not be covered by my insurance company.  I agree to be financially responsible for the cost of this service or supply in the event of my insurance denies payment as a non-covered benefit.        ______________________________________________________________________  Signature of Patient or Patient's Representative  Relationship  Date    ______________________________________________________________________  Signature of Witness to signing of form   Printed Name

## (undated) NOTE — ED AVS SNAPSHOT
Rajinder Barroso   MRN: [id number]    Department:  [facility] Emergency Department   Date of Visit:  10/28/2017           Disclosure     Insurance plans vary and the physician(s) referred by the ER may not be covered by your plan.  Please contact yo If you have been prescribed any medication(s), please fill your prescription right away and begin taking the medication(s) as directed    If the emergency physician has read X-rays, these will be re-interpreted by a radiologist.  If there is a significant

## (undated) NOTE — LETTER
OUTSIDE TESTING RESULT REQUEST     IMPORTANT: FOR YOUR IMMEDIATE ATTENTION  Please FAX all test results listed below to: [phone]     Testing already done on or about: 715 BK     * * * * If testing is NOT complete, arrange with patient A.S.A.P. * * * *      Patient Name: Fozia Ivan  Surgery Date: 2024  Medical Record: [id number]  CSN: [id number]  : 10/20/1965 - A: 58 y     Sex: female  Surgeon(s):  Ruddy Sanchez MD  Procedure: Right anterior total hip arthroplasty  Anesthesia Type: Choice     Surgeon: Ruddy Sanchez MD     The following Testing and Time Line are REQUIRED PER ANESTHESIA     CBC [with Differential & Platelets] within  60 days  CMP (requires 4 hour fast) within  90 days  PT/INR within  30 days  PTT within  30 days  Type and Screen for Pre-Admission Testing (must be within 28 days of surgery)  MSSA/MRSA Nasal screening within 30 days      Thank You,   Sent by:Daly Poole RN

## (undated) NOTE — LETTER
[facility], [facility]   Date:   9/8/2023     Name:   Jurgen Hernández    YOB: 1965   MRN:   [id number]       WHERE IS YOUR PAIN NOW? Brenda the areas on your body where you feel the described sensations. Use the appropriate symbol. Ian Franks the areas of radiation. Include all affected areas. Just to complete the picture, please draw in the face. ACHE:  ^ ^ ^   NUMBNESS:  0000   PINS & NEEDLES:  = = = =                              ^ ^ ^                       0000              = = = =                                    ^ ^ ^                       0000            = = = =      BURNING:  XXXX   STABBING: ////                  XXXX                ////                         XXXX          ////     Please brenda the line below indicating your degree of pain right now  with 0 being no pain 10 being the worst pain possible.                                          0             1             2              3             4              5              6              7             8             9             10         Patient Signature:

## (undated) NOTE — LETTER
AUTHORIZATION FOR SURGICAL OPERATION OR OTHER PROCEDURE    1. I hereby authorize Dr. Julita Muñoz and the [facility] staff assigned to my case to perform the following operation and/or procedure at the [facility]:     Bilateral iliopsoas bursa steroid injections under US guidance     2. My physician has explained the nature and purpose of the operation or other procedure, possible alternative methods of treatment, the risks involved, and the possibility of complication to me. I acknowledge that no guarantee has been made as to the result that may be obtained. 3.  I recognize that, during the course of this operation, or other procedure, unforseen conditions may necessitate additional or different procedure than those listed above. I, therefore, further authorize and request that the above named physician, his/her physician assistants or designees perform such procedures as are, in his/her professional opinion, necessary and desirable. 4.  Any tissue or organs removed in the operation or other procedure may be disposed of by and at the discretion of the [facility] staff and [facility]. 5.  I understand that in the event of a medical emergency, I will be transported by local paramedics to [facility] or other hospital emergency department. 6.  I certify that I have read and fully understand the above consent to operation and/or other procedure. 7.  I acknowledge that my physician has explained sedation/analgesia administration to me including the risks and benefits. I consent to the administration of sedation/analgesia as may be necessary or desirable in the judgement of my physician. Witness signature: ___________________________________________________ Date:  ______/______/_____                    Time:  ________ A. M.  P.M.        Patient Name:  Sylvia Fernandez  10/20/1965  [id number]         Patient signature: ___________________________________________________                   Statement of Physician  My signature below affirms that prior to the time of the procedure, I have explained to the patient and/or his/her guardian, the risks and benefits involved in the proposed treatment and any reasonable alternative to the proposed treatment. I have also explained the risks and benefits involved in the refusal of the proposed treatment and have answered the patient's questions.                         Date:  ______/______/_______  Provider                      Signature:  __________________________________________________________       Time:  ___________ ALORA BORDEN

## (undated) NOTE — LETTER
24      Orthopedic Surgery   Pre-Operative Clearance Request    Patient Name:   Fozia Ivan             :   10/20/1965    Surgeon: Dr. Sanchez             Date of Surgery: 2024    Surgical Procedure: Right anterior total hip arthroplasty       MUST COMPLETE ALL OF THE FOLLOWING 2-3 WEEKS PRIOR TO YOUR SURGERY TO AVOID CANCELLATION, DUE TO THE RULE THEIR WILL BE NO EXCEPTIONS!    [x]  History and Physical      [x]  Medical  Clearance                      Required pre-op testing to be ordered:                      [x]  CBC W/Diff                                                                 [x]  CMP                                                                                                                                                                                                                                    [x]  PT/INR  [x]  PTT  [x]  Type and Screen                     **Please fax test results, H&P, and clearance to [phone] and to P.A.T at [phone]**